# Patient Record
Sex: MALE | Race: WHITE | NOT HISPANIC OR LATINO | Employment: OTHER | URBAN - METROPOLITAN AREA
[De-identification: names, ages, dates, MRNs, and addresses within clinical notes are randomized per-mention and may not be internally consistent; named-entity substitution may affect disease eponyms.]

---

## 2019-06-21 ENCOUNTER — HOSPITAL ENCOUNTER (OUTPATIENT)
Facility: HOSPITAL | Age: 64
Setting detail: OBSERVATION
Discharge: HOME | End: 2019-06-23
Attending: EMERGENCY MEDICINE | Admitting: INTERNAL MEDICINE

## 2019-06-21 ENCOUNTER — APPOINTMENT (OUTPATIENT)
Dept: GENERAL RADIOLOGY | Facility: HOSPITAL | Age: 64
End: 2019-06-21

## 2019-06-21 DIAGNOSIS — I16.1 HYPERTENSIVE EMERGENCY: Primary | ICD-10-CM

## 2019-06-21 DIAGNOSIS — R94.31 ABNORMAL EKG: ICD-10-CM

## 2019-06-21 LAB
ALBUMIN SERPL-MCNC: 3.9 G/DL (ref 3.5–5.2)
ALBUMIN/GLOB SERPL: 1.6 G/DL
ALP SERPL-CCNC: 54 U/L (ref 39–117)
ALT SERPL W P-5'-P-CCNC: 13 U/L (ref 1–41)
ANION GAP SERPL CALCULATED.3IONS-SCNC: 11.1 MMOL/L
AST SERPL-CCNC: 14 U/L (ref 1–40)
BASOPHILS # BLD AUTO: 0.05 10*3/MM3 (ref 0–0.2)
BASOPHILS NFR BLD AUTO: 0.4 % (ref 0–1.5)
BILIRUB SERPL-MCNC: 0.6 MG/DL (ref 0.2–1.2)
BUN BLD-MCNC: 31 MG/DL (ref 8–23)
BUN/CREAT SERPL: 21.7 (ref 7–25)
CALCIUM SPEC-SCNC: 9.1 MG/DL (ref 8.6–10.5)
CHLORIDE SERPL-SCNC: 102 MMOL/L (ref 98–107)
CO2 SERPL-SCNC: 27.9 MMOL/L (ref 22–29)
CREAT BLD-MCNC: 1.43 MG/DL (ref 0.76–1.27)
DEPRECATED RDW RBC AUTO: 42.4 FL (ref 37–54)
EOSINOPHIL # BLD AUTO: 0.08 10*3/MM3 (ref 0–0.4)
EOSINOPHIL NFR BLD AUTO: 0.7 % (ref 0.3–6.2)
ERYTHROCYTE [DISTWIDTH] IN BLOOD BY AUTOMATED COUNT: 12.2 % (ref 12.3–15.4)
GFR SERPL CREATININE-BSD FRML MDRD: 50 ML/MIN/1.73
GLOBULIN UR ELPH-MCNC: 2.5 GM/DL
GLUCOSE BLD-MCNC: 159 MG/DL (ref 65–99)
HCT VFR BLD AUTO: 44.6 % (ref 37.5–51)
HGB BLD-MCNC: 14.7 G/DL (ref 13–17.7)
IMM GRANULOCYTES # BLD AUTO: 0.05 10*3/MM3 (ref 0–0.05)
IMM GRANULOCYTES NFR BLD AUTO: 0.4 % (ref 0–0.5)
LYMPHOCYTES # BLD AUTO: 3.07 10*3/MM3 (ref 0.7–3.1)
LYMPHOCYTES NFR BLD AUTO: 27.3 % (ref 19.6–45.3)
MCH RBC QN AUTO: 30.9 PG (ref 26.6–33)
MCHC RBC AUTO-ENTMCNC: 33 G/DL (ref 31.5–35.7)
MCV RBC AUTO: 93.9 FL (ref 79–97)
MONOCYTES # BLD AUTO: 0.84 10*3/MM3 (ref 0.1–0.9)
MONOCYTES NFR BLD AUTO: 7.5 % (ref 5–12)
NEUTROPHILS # BLD AUTO: 7.14 10*3/MM3 (ref 1.7–7)
NEUTROPHILS NFR BLD AUTO: 63.7 % (ref 42.7–76)
NRBC BLD AUTO-RTO: 0 /100 WBC (ref 0–0.2)
PLATELET # BLD AUTO: 301 10*3/MM3 (ref 140–450)
PMV BLD AUTO: 10.2 FL (ref 6–12)
POTASSIUM BLD-SCNC: 4.2 MMOL/L (ref 3.5–5.2)
PROT SERPL-MCNC: 6.4 G/DL (ref 6–8.5)
RBC # BLD AUTO: 4.75 10*6/MM3 (ref 4.14–5.8)
SODIUM BLD-SCNC: 141 MMOL/L (ref 136–145)
TROPONIN T SERPL-MCNC: <0.01 NG/ML (ref 0–0.03)
WBC NRBC COR # BLD: 11.23 10*3/MM3 (ref 3.4–10.8)

## 2019-06-21 PROCEDURE — 80053 COMPREHEN METABOLIC PANEL: CPT | Performed by: EMERGENCY MEDICINE

## 2019-06-21 PROCEDURE — 25010000002 HYDRALAZINE PER 20 MG: Performed by: NURSE PRACTITIONER

## 2019-06-21 PROCEDURE — 93005 ELECTROCARDIOGRAM TRACING: CPT | Performed by: EMERGENCY MEDICINE

## 2019-06-21 PROCEDURE — 96376 TX/PRO/DX INJ SAME DRUG ADON: CPT

## 2019-06-21 PROCEDURE — 84484 ASSAY OF TROPONIN QUANT: CPT | Performed by: EMERGENCY MEDICINE

## 2019-06-21 PROCEDURE — 71046 X-RAY EXAM CHEST 2 VIEWS: CPT

## 2019-06-21 PROCEDURE — 85025 COMPLETE CBC W/AUTO DIFF WBC: CPT | Performed by: EMERGENCY MEDICINE

## 2019-06-21 PROCEDURE — 25010000002 HYDRALAZINE PER 20 MG: Performed by: EMERGENCY MEDICINE

## 2019-06-21 PROCEDURE — 96374 THER/PROPH/DIAG INJ IV PUSH: CPT

## 2019-06-21 PROCEDURE — G0378 HOSPITAL OBSERVATION PER HR: HCPCS

## 2019-06-21 PROCEDURE — 99284 EMERGENCY DEPT VISIT MOD MDM: CPT

## 2019-06-21 PROCEDURE — 93010 ELECTROCARDIOGRAM REPORT: CPT | Performed by: INTERNAL MEDICINE

## 2019-06-21 RX ORDER — SODIUM CHLORIDE 0.9 % (FLUSH) 0.9 %
3-10 SYRINGE (ML) INJECTION AS NEEDED
Status: DISCONTINUED | OUTPATIENT
Start: 2019-06-21 | End: 2019-06-23 | Stop reason: HOSPADM

## 2019-06-21 RX ORDER — SODIUM CHLORIDE 0.9 % (FLUSH) 0.9 %
10 SYRINGE (ML) INJECTION AS NEEDED
Status: DISCONTINUED | OUTPATIENT
Start: 2019-06-21 | End: 2019-06-23 | Stop reason: HOSPADM

## 2019-06-21 RX ORDER — AMLODIPINE BESYLATE 5 MG/1
5 TABLET ORAL
Status: DISCONTINUED | OUTPATIENT
Start: 2019-06-22 | End: 2019-06-23 | Stop reason: HOSPADM

## 2019-06-21 RX ORDER — HYDRALAZINE HYDROCHLORIDE 20 MG/ML
20 INJECTION INTRAMUSCULAR; INTRAVENOUS ONCE
Status: COMPLETED | OUTPATIENT
Start: 2019-06-21 | End: 2019-06-21

## 2019-06-21 RX ORDER — ASPIRIN 81 MG/1
81 TABLET ORAL DAILY
COMMUNITY

## 2019-06-21 RX ORDER — ASPIRIN 81 MG/1
81 TABLET, CHEWABLE ORAL DAILY
COMMUNITY
End: 2019-06-21

## 2019-06-21 RX ORDER — SODIUM CHLORIDE 0.9 % (FLUSH) 0.9 %
3 SYRINGE (ML) INJECTION EVERY 12 HOURS SCHEDULED
Status: DISCONTINUED | OUTPATIENT
Start: 2019-06-22 | End: 2019-06-23 | Stop reason: HOSPADM

## 2019-06-21 RX ORDER — ACETAMINOPHEN 325 MG/1
650 TABLET ORAL EVERY 6 HOURS PRN
Status: DISCONTINUED | OUTPATIENT
Start: 2019-06-21 | End: 2019-06-23 | Stop reason: HOSPADM

## 2019-06-21 RX ORDER — HYDRALAZINE HYDROCHLORIDE 20 MG/ML
10 INJECTION INTRAMUSCULAR; INTRAVENOUS EVERY 6 HOURS PRN
Status: DISCONTINUED | OUTPATIENT
Start: 2019-06-21 | End: 2019-06-23 | Stop reason: HOSPADM

## 2019-06-21 RX ORDER — NITROGLYCERIN 0.4 MG/1
0.4 TABLET SUBLINGUAL
Status: DISCONTINUED | OUTPATIENT
Start: 2019-06-21 | End: 2019-06-23 | Stop reason: HOSPADM

## 2019-06-21 RX ORDER — NITROGLYCERIN 0.4 MG/1
0.4 TABLET SUBLINGUAL
Status: COMPLETED | OUTPATIENT
Start: 2019-06-21 | End: 2019-06-21

## 2019-06-21 RX ORDER — ASPIRIN 81 MG/1
81 TABLET ORAL DAILY
Status: DISCONTINUED | OUTPATIENT
Start: 2019-06-22 | End: 2019-06-23 | Stop reason: HOSPADM

## 2019-06-21 RX ADMIN — NITROGLYCERIN 0.4 MG: 0.4 TABLET, ORALLY DISINTEGRATING SUBLINGUAL at 19:05

## 2019-06-21 RX ADMIN — ACETAMINOPHEN 650 MG: 325 TABLET, FILM COATED ORAL at 23:28

## 2019-06-21 RX ADMIN — HYDRALAZINE HYDROCHLORIDE 10 MG: 20 INJECTION INTRAMUSCULAR; INTRAVENOUS at 23:28

## 2019-06-21 RX ADMIN — HYDRALAZINE HYDROCHLORIDE 20 MG: 20 INJECTION INTRAMUSCULAR; INTRAVENOUS at 19:14

## 2019-06-21 RX ADMIN — NITROGLYCERIN 0.4 MG: 0.4 TABLET, ORALLY DISINTEGRATING SUBLINGUAL at 18:56

## 2019-06-21 RX ADMIN — NITROGLYCERIN 0.4 MG: 0.4 TABLET, ORALLY DISINTEGRATING SUBLINGUAL at 18:50

## 2019-06-21 NOTE — ED NOTES
Pt reports he has been redoing his bathroom and has been working in black mold for about a month. Pt reports a headache 10/10, Hypertension and swollen glands. Upon completion of an EKG pt has elevated T waves. Pt denies CP at this time. A team C was called.      Adeola Petit, XIAO  06/21/19 1470

## 2019-06-21 NOTE — PROGRESS NOTES
Clinical Pharmacy Services: Medication History    Trever Petit is a 63 y.o. male presenting to [facility] for   Chief Complaint   Patient presents with   • Hypertension       He  has no past medical history on file.    Allergies as of 06/21/2019 - Reviewed 06/21/2019   Allergen Reaction Noted   • Clonidine derivatives Unknown (See Comments) 06/21/2019       Medication information was obtained from: Patient  Pharmacy and Phone Number: [email or web address] [phone]    Prior to Admission Medications     Prescriptions Last Dose Informant Patient Reported? Taking?    aspirin 81 MG EC tablet  Self Yes Yes    Take 81 mg by mouth Daily.            Medication notes: No other meds per patient.    This medication list is complete to the best of my knowledge as of 6/21/2019    Please call if questions.    Patti Felix, Medication History Technician  6/21/2019 7:51 PM

## 2019-06-21 NOTE — ED TRIAGE NOTES
Exposure to mold.  Went to [facility].  Incidental finding - Hypertension.  222/105.  Non compliant with meds.  HA.  Chronic neck and back pain

## 2019-06-21 NOTE — ED PROVIDER NOTES
" EMERGENCY DEPARTMENT ENCOUNTER    Room Number:  41/41  Date seen:  6/21/2019  Time seen: 6:43 PM  PCP: Provider, No Known  Historian: Patient      HPI:  Chief Complaint: High BP  A complete HPI/ROS/PMH/PSH/SH/FH are unobtainable due to: nothing  Context: Trever Petit is a 63 y.o. male who presents to the ED c/o high BP that started today. Pt admits to \"swollen neck glands\" and constipation. Pt notes that he had an MI years ago but denies cardiac stent. Pt denies weight loss, fever, cough, CP, SOA, diaphoresis, nausea, and vomiting.Pt states that he was exposed to black mold about a month ago.     Quality: High BP  Intensity/Severity: mild  Duration: today  Onset quality: gradual  Timing: constant  Progression: unchanged  Aggravating Factors: none  Alleviating Factors: none  Previous Episodes: Pt reports hx of MI.  Treatment before arrival: none  Associated Symptoms: \"swollen neck glands\" and constipation          PAST MEDICAL HISTORY  Active Ambulatory Problems     Diagnosis Date Noted   • No Active Ambulatory Problems     Resolved Ambulatory Problems     Diagnosis Date Noted   • No Resolved Ambulatory Problems     No Additional Past Medical History         PAST SURGICAL HISTORY  Past Surgical History:   Procedure Laterality Date   • NECK SURGERY           FAMILY HISTORY  History reviewed. No pertinent family history.      SOCIAL HISTORY  Social History     Socioeconomic History   • Marital status: Single     Spouse name: Not on file   • Number of children: Not on file   • Years of education: Not on file   • Highest education level: Not on file   Tobacco Use   • Smoking status: Never Smoker   Substance and Sexual Activity   • Alcohol use: No     Frequency: Never   • Drug use: Yes     Types: Marijuana   • Sexual activity: Defer         ALLERGIES  Clonidine derivatives        REVIEW OF SYSTEMS  Review of Systems   Constitutional: Negative for diaphoresis and fever.   HENT: Negative for congestion.    Eyes: " "Negative for visual disturbance.   Respiratory: Negative for cough and shortness of breath.    Cardiovascular: Negative for chest pain and palpitations.   Gastrointestinal: Positive for constipation. Negative for blood in stool and vomiting.   Endocrine: Negative for polyuria.   Genitourinary: Negative for flank pain.   Musculoskeletal: Negative for joint swelling.        Positive: \"swollen neck glands\"    Skin: Negative for wound.   Neurological: Negative for seizures.   Hematological: Negative for adenopathy.   Psychiatric/Behavioral: Negative for sleep disturbance.            PHYSICAL EXAM  ED Triage Vitals [06/21/19 1734]   Temp Heart Rate Resp BP SpO2   97.5 °F (36.4 °C) 51 18 (!) 220/110 99 %      Temp src Heart Rate Source Patient Position BP Location FiO2 (%)   Tympanic Monitor -- -- --         GENERAL: no acute distress  HENT: nares patent, oropharynx is clear  EYES: no scleral icterus  CV: regular rhythm, normal rate  RESPIRATORY: normal effort, CTAB  ABDOMEN: soft, notender  MUSCULOSKELETAL: no deformity  NEURO: alert, moves all extremities, follows commands  SKIN: warm, dry  LYMPH: Mild anterior cervical adenopathy  Vital signs and nursing notes reviewed.          LAB RESULTS  Recent Results (from the past 24 hour(s))   Comprehensive Metabolic Panel    Collection Time: 06/21/19  6:46 PM   Result Value Ref Range    Glucose 159 (H) 65 - 99 mg/dL    BUN 31 (H) 8 - 23 mg/dL    Creatinine 1.43 (H) 0.76 - 1.27 mg/dL    Sodium 141 136 - 145 mmol/L    Potassium 4.2 3.5 - 5.2 mmol/L    Chloride 102 98 - 107 mmol/L    CO2 27.9 22.0 - 29.0 mmol/L    Calcium 9.1 8.6 - 10.5 mg/dL    Total Protein 6.4 6.0 - 8.5 g/dL    Albumin 3.90 3.50 - 5.20 g/dL    ALT (SGPT) 13 1 - 41 U/L    AST (SGOT) 14 1 - 40 U/L    Alkaline Phosphatase 54 39 - 117 U/L    Total Bilirubin 0.6 0.2 - 1.2 mg/dL    eGFR Non African Amer 50 (L) >60 mL/min/1.73    Globulin 2.5 gm/dL    A/G Ratio 1.6 g/dL    BUN/Creatinine Ratio 21.7 7.0 - 25.0    " Anion Gap 11.1 mmol/L   Troponin    Collection Time: 06/21/19  6:46 PM   Result Value Ref Range    Troponin T <0.010 0.000 - 0.030 ng/mL   CBC Auto Differential    Collection Time: 06/21/19  6:46 PM   Result Value Ref Range    WBC 11.23 (H) 3.40 - 10.80 10*3/mm3    RBC 4.75 4.14 - 5.80 10*6/mm3    Hemoglobin 14.7 13.0 - 17.7 g/dL    Hematocrit 44.6 37.5 - 51.0 %    MCV 93.9 79.0 - 97.0 fL    MCH 30.9 26.6 - 33.0 pg    MCHC 33.0 31.5 - 35.7 g/dL    RDW 12.2 (L) 12.3 - 15.4 %    RDW-SD 42.4 37.0 - 54.0 fl    MPV 10.2 6.0 - 12.0 fL    Platelets 301 140 - 450 10*3/mm3    Neutrophil % 63.7 42.7 - 76.0 %    Lymphocyte % 27.3 19.6 - 45.3 %    Monocyte % 7.5 5.0 - 12.0 %    Eosinophil % 0.7 0.3 - 6.2 %    Basophil % 0.4 0.0 - 1.5 %    Immature Grans % 0.4 0.0 - 0.5 %    Neutrophils, Absolute 7.14 (H) 1.70 - 7.00 10*3/mm3    Lymphocytes, Absolute 3.07 0.70 - 3.10 10*3/mm3    Monocytes, Absolute 0.84 0.10 - 0.90 10*3/mm3    Eosinophils, Absolute 0.08 0.00 - 0.40 10*3/mm3    Basophils, Absolute 0.05 0.00 - 0.20 10*3/mm3    Immature Grans, Absolute 0.05 0.00 - 0.05 10*3/mm3    nRBC 0.0 0.0 - 0.2 /100 WBC       Ordered the above labs and reviewed the results.        RADIOLOGY  Xr Chest 2 View    Result Date: 6/21/2019  XR CHEST 2 VW-  HISTORY: Male who is 63 years-old,  cough  TECHNIQUE: Frontal and lateral views of the chest  COMPARISON: 2/13/2014  FINDINGS: Heart size is borderline. Pulmonary vasculature is unremarkable. No focal pulmonary consolidation, pleural effusion, or pneumothorax. Old granulomatous disease. No acute osseous process.      No focal pulmonary consolidation. Follow-up as clinical indications persist.  This report was finalized on 6/21/2019 7:55 PM by Dr. Ajith Holder M.D.        Ordered the above noted radiological studies. Reviewed by me in PACS.          PROCEDURES  Procedures  EKG:           EKG time: 1839  Rhythm/Rate: SB rate 49  P waves and IA: nml  QRS, axis: RBBB and LAFB   ST and T waves:  proximally 2mm of ST elevation in V2 as well as 1mm ST elevation in V3 and V4  Concerning for acute anterior MI    Interpreted Contemporaneously by me, independently viewed and changed compared to prior 5/19/11. The RBBB and anterior ST elevation are new.    REPEAT EKG          EKG time: 1934  Rhythm/Rate: SR rate 57  P waves and DE: nml  QRS, axis: RBBB and LAFB   ST and T waves: perhaps .5mm of ST elevation in V2 and V3 which has improved from prior tracing to day    The pt's BP is know 164/89     Interpreted Contemporaneously by me, independently viewed.        MEDICATIONS GIVEN IN ER  Medications   sodium chloride 0.9 % flush 10 mL (not administered)   nitroglycerin (NITROSTAT) SL tablet 0.4 mg (0.4 mg Sublingual Given 6/21/19 1905)   hydrALAZINE (APRESOLINE) injection 20 mg (20 mg Intravenous Given 6/21/19 1914)       PROGRESS AND CONSULTS     1848- Discussed pt's case with Dr. Paz ([facility]) who will review the EKG.    1851- Dr. Paz requests aggressive BP control and a repeat EKG and that troponin labs be ordered due to the pt being asymptomatic.    1944- Repeat EKG was sent to Dr. Paz via text message. Dr. Paz will admit the pt to a tele bed for further care.    1959- Rechecked pt who is resting comfortably in NAD. Informed pt of the lab and imaging results. D/w pt the plan to admit to the hospital for further care. Pt understands and agrees with plan. All questions answered.            MEDICAL DECISION MAKING    63-year-old male was sent here from immediate care clinic for asymptomatic hypertension.  He was noted to be hypertensive on arrival but no complaint of chest pain, shortness of breath, nausea, diaphoresis.  Screening EKG revealed right bundle branch block which is new from previous tracing and also ST elevation in leads V2 and V3 concerning for acute myocardial infarction.  I discussed these findings with Dr. Paz who reviewed the patient's EKG from today as well as his previous EKG.  Given the patient has no  symptoms he felt this was most likely related to his elevated blood pressure I recommended reducing the patient's blood pressure and obtaining repeat EKG.  After the patient received sublingual nitroglycerin as well as IV hydralazine to lower his blood pressure, repeat EKG showed resolution of these ST elevation abnormalities.  Additionally, his initial cardiac troponin is negative.  Labs are otherwise reassuring.  His chest x-ray is clear.  I have no findings suggestive of toxic mold exposure.  He will be admitted to cardiology for further evaluation of his dynamic EKG changes and hypertensive emergency.    MDM  Number of Diagnoses or Management Options  Abnormal EKG:   Hypertensive emergency:      Amount and/or Complexity of Data Reviewed  Clinical lab tests: ordered and reviewed (WBC: 11.23  Troponin: negative)  Tests in the radiology section of CPT®: ordered and reviewed (CXR: Negative acute)  Tests in the medicine section of CPT®: ordered and reviewed (See EKG and REPEAT EKG note)               DIAGNOSIS  Final diagnoses:   Hypertensive emergency   Abnormal EKG         DISPOSITION  ADMISSION    Discussed treatment plan and reason for admission with pt/family and admitting physician.  Pt/family voiced understanding of the plan for admission for further testing/treatment as needed.                 Latest Documented Vital Signs:  As of 8:13 PM  BP- 179/86 HR- 51 Temp- 97.5 °F (36.4 °C) (Tympanic) O2 sat- 99%        --  Documentation assistance provided by pastor Aguirre for Dr. CHRIS Blankenship MD.  Information recorded by the scribe was done at my direction and has been verified and validated by me.      Please note that portions of this were completed with a voice recognition program.          Thea Aguirre  06/21/19 2014       Bebeto Blankenship MD  06/21/19 4977

## 2019-06-22 ENCOUNTER — APPOINTMENT (OUTPATIENT)
Dept: CARDIOLOGY | Facility: HOSPITAL | Age: 64
End: 2019-06-22

## 2019-06-22 LAB
ANION GAP SERPL CALCULATED.3IONS-SCNC: 11.1 MMOL/L
AORTIC DIMENSIONLESS INDEX: 0.7 (DI)
BH CV ECHO MEAS - ACS: 2.2 CM
BH CV ECHO MEAS - AO MAX PG (FULL): 13.8 MMHG
BH CV ECHO MEAS - AO MAX PG: 22.5 MMHG
BH CV ECHO MEAS - AO MEAN PG (FULL): 6 MMHG
BH CV ECHO MEAS - AO MEAN PG: 11 MMHG
BH CV ECHO MEAS - AO V2 MAX: 237 CM/SEC
BH CV ECHO MEAS - AO V2 MEAN: 147 CM/SEC
BH CV ECHO MEAS - AO V2 VTI: 45.8 CM
BH CV ECHO MEAS - AVA(I,A): 2.3 CM^2
BH CV ECHO MEAS - AVA(I,D): 2.3 CM^2
BH CV ECHO MEAS - AVA(V,A): 2.4 CM^2
BH CV ECHO MEAS - AVA(V,D): 2.4 CM^2
BH CV ECHO MEAS - BSA(HAYCOCK): 1.8 M^2
BH CV ECHO MEAS - BSA: 1.8 M^2
BH CV ECHO MEAS - BZI_BMI: 23.6 KILOGRAMS/M^2
BH CV ECHO MEAS - BZI_METRIC_HEIGHT: 172.7 CM
BH CV ECHO MEAS - BZI_METRIC_WEIGHT: 70.3 KG
BH CV ECHO MEAS - EDV(CUBED): 74.1 ML
BH CV ECHO MEAS - EDV(MOD-SP2): 67 ML
BH CV ECHO MEAS - EDV(MOD-SP4): 66 ML
BH CV ECHO MEAS - EDV(TEICH): 78.6 ML
BH CV ECHO MEAS - EF(CUBED): 73.4 %
BH CV ECHO MEAS - EF(MOD-BP): 68 %
BH CV ECHO MEAS - EF(MOD-SP2): 65.7 %
BH CV ECHO MEAS - EF(MOD-SP4): 69.7 %
BH CV ECHO MEAS - EF(TEICH): 65.6 %
BH CV ECHO MEAS - ESV(CUBED): 19.7 ML
BH CV ECHO MEAS - ESV(MOD-SP2): 23 ML
BH CV ECHO MEAS - ESV(MOD-SP4): 20 ML
BH CV ECHO MEAS - ESV(TEICH): 27 ML
BH CV ECHO MEAS - FS: 35.7 %
BH CV ECHO MEAS - IVS/LVPW: 1.3
BH CV ECHO MEAS - IVSD: 1.9 CM
BH CV ECHO MEAS - LAT PEAK E' VEL: 8.9 CM/SEC
BH CV ECHO MEAS - LV DIASTOLIC VOL/BSA (35-75): 36 ML/M^2
BH CV ECHO MEAS - LV MASS(C)D: 304.2 GRAMS
BH CV ECHO MEAS - LV MASS(C)DI: 165.9 GRAMS/M^2
BH CV ECHO MEAS - LV MAX PG: 8.6 MMHG
BH CV ECHO MEAS - LV MEAN PG: 5 MMHG
BH CV ECHO MEAS - LV SYSTOLIC VOL/BSA (12-30): 10.9 ML/M^2
BH CV ECHO MEAS - LV V1 MAX: 147 CM/SEC
BH CV ECHO MEAS - LV V1 MEAN: 105 CM/SEC
BH CV ECHO MEAS - LV V1 VTI: 27.5 CM
BH CV ECHO MEAS - LVIDD: 4.2 CM
BH CV ECHO MEAS - LVIDS: 2.7 CM
BH CV ECHO MEAS - LVLD AP2: 7.4 CM
BH CV ECHO MEAS - LVLD AP4: 7.8 CM
BH CV ECHO MEAS - LVLS AP2: 6 CM
BH CV ECHO MEAS - LVLS AP4: 5.9 CM
BH CV ECHO MEAS - LVOT AREA (M): 3.8 CM^2
BH CV ECHO MEAS - LVOT AREA: 3.8 CM^2
BH CV ECHO MEAS - LVOT DIAM: 2.2 CM
BH CV ECHO MEAS - LVPWD: 1.5 CM
BH CV ECHO MEAS - MED PEAK E' VEL: 5.1 CM/SEC
BH CV ECHO MEAS - MV A DUR: 0.14 SEC
BH CV ECHO MEAS - MV A MAX VEL: 108 CM/SEC
BH CV ECHO MEAS - MV DEC SLOPE: 320 CM/SEC^2
BH CV ECHO MEAS - MV DEC TIME: 327 SEC
BH CV ECHO MEAS - MV E MAX VEL: 66.7 CM/SEC
BH CV ECHO MEAS - MV E/A: 0.62
BH CV ECHO MEAS - MV MAX PG: 9.6 MMHG
BH CV ECHO MEAS - MV MEAN PG: 3 MMHG
BH CV ECHO MEAS - MV P1/2T MAX VEL: 132 CM/SEC
BH CV ECHO MEAS - MV P1/2T: 120.8 MSEC
BH CV ECHO MEAS - MV V2 MAX: 155 CM/SEC
BH CV ECHO MEAS - MV V2 MEAN: 79.3 CM/SEC
BH CV ECHO MEAS - MV V2 VTI: 43.3 CM
BH CV ECHO MEAS - MVA P1/2T LCG: 1.7 CM^2
BH CV ECHO MEAS - MVA(P1/2T): 1.8 CM^2
BH CV ECHO MEAS - MVA(VTI): 2.4 CM^2
BH CV ECHO MEAS - PA ACC TIME: 0.08 SEC
BH CV ECHO MEAS - PA MAX PG (FULL): 2.5 MMHG
BH CV ECHO MEAS - PA MAX PG: 8 MMHG
BH CV ECHO MEAS - PA PR(ACCEL): 43.5 MMHG
BH CV ECHO MEAS - PA V2 MAX: 141 CM/SEC
BH CV ECHO MEAS - PULM A REVS DUR: 0.12 SEC
BH CV ECHO MEAS - PULM A REVS VEL: 32.3 CM/SEC
BH CV ECHO MEAS - PULM DIAS VEL: 39.3 CM/SEC
BH CV ECHO MEAS - PULM S/D: 1.8
BH CV ECHO MEAS - PULM SYS VEL: 70.6 CM/SEC
BH CV ECHO MEAS - RAP SYSTOLE: 3 MMHG
BH CV ECHO MEAS - RV MAX PG: 5.5 MMHG
BH CV ECHO MEAS - RV MEAN PG: 3 MMHG
BH CV ECHO MEAS - RV V1 MAX: 117 CM/SEC
BH CV ECHO MEAS - RV V1 MEAN: 79.2 CM/SEC
BH CV ECHO MEAS - RV V1 VTI: 23 CM
BH CV ECHO MEAS - RVSP: 13 MMHG
BH CV ECHO MEAS - SI(CUBED): 29.7 ML/M^2
BH CV ECHO MEAS - SI(LVOT): 57 ML/M^2
BH CV ECHO MEAS - SI(MOD-SP2): 24 ML/M^2
BH CV ECHO MEAS - SI(MOD-SP4): 25.1 ML/M^2
BH CV ECHO MEAS - SI(TEICH): 28.1 ML/M^2
BH CV ECHO MEAS - SV(CUBED): 54.4 ML
BH CV ECHO MEAS - SV(LVOT): 104.5 ML
BH CV ECHO MEAS - SV(MOD-SP2): 44 ML
BH CV ECHO MEAS - SV(MOD-SP4): 46 ML
BH CV ECHO MEAS - SV(TEICH): 51.6 ML
BH CV ECHO MEAS - TAPSE (>1.6): 2.7 CM2
BH CV ECHO MEAS - TR MAX PG: 10
BH CV ECHO MEAS - TR MAX VEL: 161 CM/SEC
BH CV ECHO MEASUREMENTS AVERAGE E/E' RATIO: 9.53
BH CV VAS BP RIGHT ARM: NORMAL MMHG
BH CV XLRA - RV BASE: 3.3 CM
BH CV XLRA - TDI S': 13.6 CM/SEC
BUN BLD-MCNC: 27 MG/DL (ref 8–23)
BUN/CREAT SERPL: 23.3 (ref 7–25)
CALCIUM SPEC-SCNC: 8.7 MG/DL (ref 8.6–10.5)
CHLORIDE SERPL-SCNC: 105 MMOL/L (ref 98–107)
CHOLEST SERPL-MCNC: 154 MG/DL (ref 0–200)
CO2 SERPL-SCNC: 22.9 MMOL/L (ref 22–29)
CREAT BLD-MCNC: 1.16 MG/DL (ref 0.76–1.27)
GFR SERPL CREATININE-BSD FRML MDRD: 64 ML/MIN/1.73
GLUCOSE BLD-MCNC: 168 MG/DL (ref 65–99)
GLUCOSE BLDC GLUCOMTR-MCNC: 119 MG/DL (ref 70–130)
GLUCOSE BLDC GLUCOMTR-MCNC: 184 MG/DL (ref 70–130)
GLUCOSE BLDC GLUCOMTR-MCNC: 185 MG/DL (ref 70–130)
HDLC SERPL-MCNC: 31 MG/DL (ref 40–60)
LDLC SERPL CALC-MCNC: 59 MG/DL (ref 0–100)
LDLC/HDLC SERPL: 1.89 {RATIO}
LEFT ATRIUM VOLUME INDEX: 32 ML/M2
LV EF 2D ECHO EST: 68 %
MAXIMAL PREDICTED HEART RATE: 157 BPM
POTASSIUM BLD-SCNC: 3.8 MMOL/L (ref 3.5–5.2)
SODIUM BLD-SCNC: 139 MMOL/L (ref 136–145)
STRESS TARGET HR: 133 BPM
TRIGL SERPL-MCNC: 322 MG/DL (ref 0–150)
TROPONIN T SERPL-MCNC: <0.01 NG/ML (ref 0–0.03)
VLDLC SERPL-MCNC: 64.4 MG/DL (ref 5–40)

## 2019-06-22 PROCEDURE — 80048 BASIC METABOLIC PNL TOTAL CA: CPT | Performed by: NURSE PRACTITIONER

## 2019-06-22 PROCEDURE — 84484 ASSAY OF TROPONIN QUANT: CPT | Performed by: NURSE PRACTITIONER

## 2019-06-22 PROCEDURE — 80061 LIPID PANEL: CPT | Performed by: NURSE PRACTITIONER

## 2019-06-22 PROCEDURE — 93306 TTE W/DOPPLER COMPLETE: CPT | Performed by: INTERNAL MEDICINE

## 2019-06-22 PROCEDURE — 82962 GLUCOSE BLOOD TEST: CPT

## 2019-06-22 PROCEDURE — 25010000002 HYDRALAZINE PER 20 MG: Performed by: NURSE PRACTITIONER

## 2019-06-22 PROCEDURE — 96376 TX/PRO/DX INJ SAME DRUG ADON: CPT

## 2019-06-22 PROCEDURE — G0378 HOSPITAL OBSERVATION PER HR: HCPCS

## 2019-06-22 PROCEDURE — 93306 TTE W/DOPPLER COMPLETE: CPT

## 2019-06-22 PROCEDURE — 99220 PR INITIAL OBSERVATION CARE/DAY 70 MINUTES: CPT | Performed by: INTERNAL MEDICINE

## 2019-06-22 RX ORDER — HYDRALAZINE HYDROCHLORIDE 50 MG/1
50 TABLET, FILM COATED ORAL EVERY 12 HOURS SCHEDULED
Status: DISCONTINUED | OUTPATIENT
Start: 2019-06-22 | End: 2019-06-23 | Stop reason: HOSPADM

## 2019-06-22 RX ADMIN — SODIUM CHLORIDE, PRESERVATIVE FREE 3 ML: 5 INJECTION INTRAVENOUS at 21:27

## 2019-06-22 RX ADMIN — HYDRALAZINE HYDROCHLORIDE 10 MG: 20 INJECTION INTRAMUSCULAR; INTRAVENOUS at 16:55

## 2019-06-22 RX ADMIN — HYDRALAZINE HYDROCHLORIDE 50 MG: 50 TABLET, FILM COATED ORAL at 21:27

## 2019-06-22 RX ADMIN — ACETAMINOPHEN 650 MG: 325 TABLET, FILM COATED ORAL at 11:42

## 2019-06-22 RX ADMIN — HYDRALAZINE HYDROCHLORIDE 50 MG: 50 TABLET, FILM COATED ORAL at 12:54

## 2019-06-22 RX ADMIN — AMLODIPINE BESYLATE 5 MG: 5 TABLET ORAL at 08:43

## 2019-06-22 RX ADMIN — SODIUM CHLORIDE, PRESERVATIVE FREE 3 ML: 5 INJECTION INTRAVENOUS at 00:02

## 2019-06-22 RX ADMIN — HYDRALAZINE HYDROCHLORIDE 10 MG: 20 INJECTION INTRAMUSCULAR; INTRAVENOUS at 05:14

## 2019-06-22 RX ADMIN — ACETAMINOPHEN 650 MG: 325 TABLET, FILM COATED ORAL at 21:27

## 2019-06-22 RX ADMIN — ASPIRIN 81 MG: 81 TABLET, COATED ORAL at 08:43

## 2019-06-22 RX ADMIN — SODIUM CHLORIDE, PRESERVATIVE FREE 3 ML: 5 INJECTION INTRAVENOUS at 08:53

## 2019-06-22 NOTE — H&P
"    Patient Name: Trever Petit  :1955  63 y.o.    Date of Admission: 2019  Date of Consultation:  19  Encounter Provider: Mumtaz Paz MD  Place of Service: [facility] CARDIOLOGY  Referring Provider: Mumtaz Paz MD  Patient Care Team:  Provider, No Known as PCP - General      Chief complaint: Hypertensive urgency    History of Present Illness: Mr. Petit is a 63 year old male with minimal documeneted medical history who presented to the ED 19 with reports of high blood pressure that had started earlier that day. He reports \"swollen neck glands\" and constipation and states he had an MI several years ago, but did not receive any stents. He also reports that he had been remodeling his bathroom and was exposed to black mold about a month ago. CXR upon arrival to the ED was negative for any acute processes. Troponin x2 have been negative. Creatinine 1.43 and WBC 11.23 on arrival. Blood pressure was noted to be 220/110 in the ED. He recieved 20mg IV Hydralazine. SBP has remained elevated between 150-210.     CXR 19  FINDINGS: Heart size is borderline. Pulmonary vasculature is  unremarkable. No focal pulmonary consolidation, pleural effusion, or  pneumothorax. Old granulomatous disease. No acute osseous process.  IMPRESSION:  No focal pulmonary consolidation. Follow-up as clinical  indications persist.      Past Medical History:   Diagnosis Date   • Arthritis    • Carpal bone fracture    • Carpal tunnel syndrome    • Constipation    • COPD (chronic obstructive pulmonary disease) (CMS/HCC)    • Coronary artery disease    • Diabetes mellitus (CMS/HCC)    • Elevated cholesterol    • Hypertension    • Myocardial infarction (CMS/HCC)    • Neuropathy    • Spinal stenosis    • Stroke (CMS/HCC)        Past Surgical History:   Procedure Laterality Date   • CARPAL TUNNEL RELEASE     • CERVICAL FUSION     • NECK SURGERY           Prior to Admission medications  "   Medication Sig Start Date End Date Taking? Authorizing Provider   aspirin 81 MG EC tablet Take 81 mg by mouth Daily.   Yes Provider, Historical, MD       Allergies   Allergen Reactions   • Clonidine Derivatives Unknown (See Comments)     .       Social History     Socioeconomic History   • Marital status: Single     Spouse name: Not on file   • Number of children: Not on file   • Years of education: Not on file   • Highest education level: Not on file   Tobacco Use   • Smoking status: Never Smoker   Substance and Sexual Activity   • Alcohol use: No     Frequency: Never   • Drug use: Yes     Types: Marijuana   • Sexual activity: Defer       History reviewed. No pertinent family history.    REVIEW OF SYSTEMS:   All systems reviewed.  Pertinent positives identified in HPI.  All other systems are negative.      Objective:     Vitals:    06/22/19 0510 06/22/19 0514 06/22/19 0600 06/22/19 0815   BP:  (!) 196/99 170/88 (!) 185/108   BP Location:  Left arm Left arm Left arm   Patient Position:  Lying Lying Lying   Pulse:  54     Resp: 16  16 16   Temp:    98.1 °F (36.7 °C)   TempSrc:    Oral   SpO2:       Weight:       Height:         Body mass index is 23.66 kg/m².    General Appearance:    Alert, cooperative, in no acute distress   Head:    Normocephalic, without obvious abnormality, atraumatic   Eyes:            Lids and lashes normal, conjunctivae and sclerae normal, no   icterus, no pallor, corneas clear, PERRLA   Ears:    Ears appear intact with no abnormalities noted   Throat:   No oral lesions, no thrush, oral mucosa moist   Neck:   No adenopathy, supple, trachea midline, no thyromegaly, no   carotid bruit, no JVD   Back:     No kyphosis present, no scoliosis present, no skin lesions, erythema or scars, no tenderness to percussion or palpation, range of motion normal   Lungs:     Clear to auscultation,respirations regular, even and unlabored    Heart:    Regular rhythm and normal rate, normal S1 and S2, no murmur,  no gallop, no rub, no click   Chest Wall:    No abnormalities observed   Abdomen:     Normal bowel sounds, no masses, no organomegaly, soft        non-tender, non-distended, no guarding, no rebound  tenderness   Extremities:   Moves all extremities well, no edema, no cyanosis, no redness   Pulses:   Pulses palpable and equal bilaterally. Normal radial, carotid, femoral, dorsalis pedis and posterior tibial pulses bilaterally. Normal abdominal aorta   Skin:  Psychiatric:   No bleeding, bruising or rash    Alert and oriented x 3, normal mood and affect   Lab Review:     Results from last 7 days   Lab Units 06/22/19  0437 06/21/19  1846   SODIUM mmol/L 139 141   POTASSIUM mmol/L 3.8 4.2   CHLORIDE mmol/L 105 102   CO2 mmol/L 22.9 27.9   BUN mg/dL 27* 31*   CREATININE mg/dL 1.16 1.43*   CALCIUM mg/dL 8.7 9.1   BILIRUBIN mg/dL  --  0.6   ALK PHOS U/L  --  54   ALT (SGPT) U/L  --  13   AST (SGOT) U/L  --  14   GLUCOSE mg/dL 168* 159*     Results from last 7 days   Lab Units 06/22/19  0028 06/21/19  1846   TROPONIN T ng/mL <0.010 <0.010     Results from last 7 days   Lab Units 06/21/19  1846   WBC 10*3/mm3 11.23*   HEMOGLOBIN g/dL 14.7   HEMATOCRIT % 44.6   PLATELETS 10*3/mm3 301             Results from last 7 days   Lab Units 06/22/19  0437   CHOLESTEROL mg/dL 154   TRIGLYCERIDES mg/dL 322*   HDL CHOL mg/dL 31*   LDL CHOL mg/dL 59               EKG 6/21/19 @ 1934    EKG 6/21/19 @ 1839            I personally viewed and interpreted the patient's EKG/Telemetry data.        Assessment and Plan:       63 year old man with long-standing hypertension.  He was previously on clonidine, but stopped it due to side effects.  He doesn't have a PCP or cardiologist.  He presents with asymptomatic severe HTN.  Now improved.  I would try hydralazine and amlodipine.    He'll need close follow up as outpatient by cardiology and he'll need some help finding a PCP.    Mumtaz Paz MD  06/22/19  11:02 AM

## 2019-06-22 NOTE — PLAN OF CARE
Problem: Patient Care Overview  Goal: Plan of Care Review  Outcome: Ongoing (interventions implemented as appropriate)   06/22/19 0075   Coping/Psychosocial   Plan of Care Reviewed With patient   Plan of Care Review   Progress improving   OTHER   Outcome Summary Pt still with HTN, Hydralazine given po and prn. Will continue to monitor closely        Problem: Hypertensive Disease/Crisis (Arterial) (Adult)  Goal: Signs and Symptoms of Listed Potential Problems Will be Absent, Minimized or Managed (Hypertensive Disease/Crisis)  Outcome: Ongoing (interventions implemented as appropriate)      Problem: Pain, Chronic (Adult)  Goal: Identify Related Risk Factors and Signs and Symptoms  Outcome: Ongoing (interventions implemented as appropriate)

## 2019-06-22 NOTE — PLAN OF CARE
Problem: Patient Care Overview  Goal: Interprofessional Rounds/Family Conf  Outcome: Ongoing (interventions implemented as appropriate)      Problem: Hypertensive Disease/Crisis (Arterial) (Adult)  Goal: Signs and Symptoms of Listed Potential Problems Will be Absent, Minimized or Managed (Hypertensive Disease/Crisis)  Outcome: Ongoing (interventions implemented as appropriate)

## 2019-06-23 VITALS
HEART RATE: 70 BPM | TEMPERATURE: 98.1 F | RESPIRATION RATE: 18 BRPM | OXYGEN SATURATION: 97 % | BODY MASS INDEX: 23.49 KG/M2 | DIASTOLIC BLOOD PRESSURE: 78 MMHG | WEIGHT: 155 LBS | HEIGHT: 68 IN | SYSTOLIC BLOOD PRESSURE: 140 MMHG

## 2019-06-23 PROCEDURE — 99217 PR OBSERVATION CARE DISCHARGE MANAGEMENT: CPT | Performed by: INTERNAL MEDICINE

## 2019-06-23 PROCEDURE — 96376 TX/PRO/DX INJ SAME DRUG ADON: CPT

## 2019-06-23 PROCEDURE — G0378 HOSPITAL OBSERVATION PER HR: HCPCS

## 2019-06-23 PROCEDURE — 25010000002 HYDRALAZINE PER 20 MG: Performed by: NURSE PRACTITIONER

## 2019-06-23 RX ORDER — HYDRALAZINE HYDROCHLORIDE 50 MG/1
50 TABLET, FILM COATED ORAL EVERY 12 HOURS SCHEDULED
Qty: 60 TABLET | Refills: 11 | Status: SHIPPED | OUTPATIENT
Start: 2019-06-23 | End: 2020-05-27 | Stop reason: HOSPADM

## 2019-06-23 RX ORDER — METOPROLOL TARTRATE 50 MG/1
50 TABLET, FILM COATED ORAL EVERY 12 HOURS SCHEDULED
Qty: 60 TABLET | Refills: 11 | Status: SHIPPED | OUTPATIENT
Start: 2019-06-23 | End: 2019-06-28 | Stop reason: HOSPADM

## 2019-06-23 RX ORDER — AMLODIPINE BESYLATE 5 MG/1
5 TABLET ORAL
Qty: 30 TABLET | Refills: 11 | Status: ON HOLD | OUTPATIENT
Start: 2019-06-24 | End: 2019-06-28 | Stop reason: SDUPTHER

## 2019-06-23 RX ORDER — METOPROLOL TARTRATE 50 MG/1
50 TABLET, FILM COATED ORAL EVERY 12 HOURS SCHEDULED
Status: DISCONTINUED | OUTPATIENT
Start: 2019-06-23 | End: 2019-06-23 | Stop reason: HOSPADM

## 2019-06-23 RX ADMIN — ACETAMINOPHEN 650 MG: 325 TABLET, FILM COATED ORAL at 06:57

## 2019-06-23 RX ADMIN — HYDRALAZINE HYDROCHLORIDE 10 MG: 20 INJECTION INTRAMUSCULAR; INTRAVENOUS at 04:21

## 2019-06-23 RX ADMIN — AMLODIPINE BESYLATE 5 MG: 5 TABLET ORAL at 08:57

## 2019-06-23 RX ADMIN — HYDRALAZINE HYDROCHLORIDE 50 MG: 50 TABLET, FILM COATED ORAL at 08:58

## 2019-06-23 RX ADMIN — ASPIRIN 81 MG: 81 TABLET, COATED ORAL at 08:57

## 2019-06-23 RX ADMIN — METOPROLOL TARTRATE 50 MG: 50 TABLET, FILM COATED ORAL at 10:43

## 2019-06-23 RX ADMIN — SODIUM CHLORIDE, PRESERVATIVE FREE 3 ML: 5 INJECTION INTRAVENOUS at 08:58

## 2019-06-23 NOTE — DISCHARGE SUMMARY
Date of Discharge:  6/23/2019  Date of Admit: 6/21/2019    Discharge Diagnosis:  Hypertensive emergency      Hospital Course: The patient was admitted due to hypertensive urgency.  He was treated with escalating doses of BP medication.  By 6/23/19 his blood pressure became adequately controlled.  He is now ready for discharge.    Procedures Performed         Consults     No orders found from 5/23/2019 to 6/22/2019.          Pertinent Test Results:     Interpretation Summary     · Left ventricular systolic function is normal. Calculated EF = 68.0%. Estimated EF was in agreement with the calculated EF. Estimated EF = 68%. Normal left ventricular cavity size noted. All left ventricular wall segments contract normally. Left ventricular wall thickness is consistent with moderate septal asymmetric hypertrophy. There is no LVOT obstruction.. Left ventricular diastolic dysfunction is noted (grade I) consistent with impaired relaxation.  · The aortic valve is abnormal in structure. There is mild thickening of the aortic valve. No aortic valve regurgitation is present.  · Mild MAC is present. Trace mitral valve regurgitation is present.  · Trace tricuspid valve regurgitation is present. Estimated right ventricular systolic pressure from tricuspid regurgitation is normal (<35 mmHg).  · Mild dilation of the ascending aorta is present. The ascending aorta measures 3.8cm.           Discharge Physical Exam:    General Appearance No acute distress   Neck No adenopathy, supple, trachea midline, no thyromegaly, no carotid bruit, no JVD   Lungs Clear to auscultation,respirations regular, even and unlabored   Heart Regular rhythm and normal rate, normal S1 and S2, no murmur, no gallop, no rub, no click   Chest wall No abnormalities observed   Abdomen Normal bowel sounds, no masses, no hepatomegaly, soft   Extremities Moves all extremities well, no edema, no cyanosis, no redness   Neurological Alert and oriented x 3     Discharge  Medications     Discharge Medications      New Medications      Instructions Start Date   amLODIPine 5 MG tablet  Commonly known as:  NORVASC   5 mg, Oral, Every 24 Hours Scheduled   Start Date:  6/24/2019     hydrALAZINE 50 MG tablet  Commonly known as:  APRESOLINE   50 mg, Oral, Every 12 Hours Scheduled      metoprolol tartrate 50 MG tablet  Commonly known as:  LOPRESSOR   50 mg, Oral, Every 12 Hours Scheduled         Continue These Medications      Instructions Start Date   aspirin 81 MG EC tablet   81 mg, Oral, Daily             Discharge Diet: healthy heart    Activity at Discharge: ad antoinette    Discharge disposition: home    Condition on Discharge: stable    Follow-up Appointments  No future appointments.  Additional Instructions for the Follow-ups that You Need to Schedule     Discharge Follow-up with Specified Provider: 1 week with interventional NP   As directed      To:  1 week with interventional NP               Test Results Pending at Discharge       Mumtaz Paz MD  06/23/19  10:16 AM

## 2019-06-23 NOTE — PLAN OF CARE
Problem: Patient Care Overview  Goal: Plan of Care Review  Outcome: Ongoing (interventions implemented as appropriate)   06/23/19 1646   Plan of Care Review   Progress no change   OTHER   Outcome Summary Pt continues with uncontrolled htn, prn and po hydralazine given, bp decreased some, pt has been restless with rest periods in between ambulating in [place] off and on all night. C/o HA relieved with prn tylenol.      Goal: Discharge Needs Assessment  Outcome: Ongoing (interventions implemented as appropriate)      Problem: Hypertensive Disease/Crisis (Arterial) (Adult)  Goal: Signs and Symptoms of Listed Potential Problems Will be Absent, Minimized or Managed (Hypertensive Disease/Crisis)  Outcome: Ongoing (interventions implemented as appropriate)      Problem: Pain, Chronic (Adult)  Goal: Acceptable Pain/Comfort Level and Functional Ability  Outcome: Ongoing (interventions implemented as appropriate)

## 2019-06-25 ENCOUNTER — HOSPITAL ENCOUNTER (OUTPATIENT)
Facility: HOSPITAL | Age: 64
Setting detail: OBSERVATION
Discharge: HOME | End: 2019-06-28
Attending: INTERNAL MEDICINE | Admitting: INTERNAL MEDICINE

## 2019-06-25 DIAGNOSIS — I10 ESSENTIAL HYPERTENSION: ICD-10-CM

## 2019-06-25 DIAGNOSIS — I21.09: Primary | ICD-10-CM

## 2019-06-25 LAB
ALBUMIN SERPL-MCNC: 3.7 G/DL (ref 3.5–5.2)
ALBUMIN/GLOB SERPL: 1.3 G/DL
ALP SERPL-CCNC: 48 U/L (ref 39–117)
ALT SERPL W P-5'-P-CCNC: 10 U/L (ref 1–41)
ANION GAP SERPL CALCULATED.3IONS-SCNC: 9 MMOL/L
AST SERPL-CCNC: 10 U/L (ref 1–40)
BASOPHILS # BLD AUTO: 0.03 10*3/MM3 (ref 0–0.2)
BASOPHILS NFR BLD AUTO: 0.2 % (ref 0–1.5)
BILIRUB SERPL-MCNC: 0.6 MG/DL (ref 0.2–1.2)
BUN BLD-MCNC: 31 MG/DL (ref 8–23)
BUN/CREAT SERPL: 26.3 (ref 7–25)
CALCIUM SPEC-SCNC: 9.3 MG/DL (ref 8.6–10.5)
CHLORIDE SERPL-SCNC: 104 MMOL/L (ref 98–107)
CO2 SERPL-SCNC: 24 MMOL/L (ref 22–29)
CREAT BLD-MCNC: 1.18 MG/DL (ref 0.76–1.27)
DEPRECATED RDW RBC AUTO: 44.4 FL (ref 37–54)
EOSINOPHIL # BLD AUTO: 0.03 10*3/MM3 (ref 0–0.4)
EOSINOPHIL NFR BLD AUTO: 0.2 % (ref 0.3–6.2)
ERYTHROCYTE [DISTWIDTH] IN BLOOD BY AUTOMATED COUNT: 12.8 % (ref 12.3–15.4)
GFR SERPL CREATININE-BSD FRML MDRD: 62 ML/MIN/1.73
GLOBULIN UR ELPH-MCNC: 2.9 GM/DL
GLUCOSE BLD-MCNC: 177 MG/DL (ref 65–99)
HCT VFR BLD AUTO: 43.6 % (ref 37.5–51)
HGB BLD-MCNC: 14.7 G/DL (ref 13–17.7)
HOLD SPECIMEN: NORMAL
HOLD SPECIMEN: NORMAL
IMM GRANULOCYTES # BLD AUTO: 0.04 10*3/MM3 (ref 0–0.05)
IMM GRANULOCYTES NFR BLD AUTO: 0.3 % (ref 0–0.5)
LYMPHOCYTES # BLD AUTO: 2.99 10*3/MM3 (ref 0.7–3.1)
LYMPHOCYTES NFR BLD AUTO: 21 % (ref 19.6–45.3)
MCH RBC QN AUTO: 31.8 PG (ref 26.6–33)
MCHC RBC AUTO-ENTMCNC: 33.7 G/DL (ref 31.5–35.7)
MCV RBC AUTO: 94.4 FL (ref 79–97)
MONOCYTES # BLD AUTO: 1.2 10*3/MM3 (ref 0.1–0.9)
MONOCYTES NFR BLD AUTO: 8.4 % (ref 5–12)
NEUTROPHILS # BLD AUTO: 9.95 10*3/MM3 (ref 1.7–7)
NEUTROPHILS NFR BLD AUTO: 69.9 % (ref 42.7–76)
PLATELET # BLD AUTO: 295 10*3/MM3 (ref 140–450)
PMV BLD AUTO: 9.9 FL (ref 6–12)
POTASSIUM BLD-SCNC: 4.1 MMOL/L (ref 3.5–5.2)
PROT SERPL-MCNC: 6.6 G/DL (ref 6–8.5)
RBC # BLD AUTO: 4.62 10*6/MM3 (ref 4.14–5.8)
SODIUM BLD-SCNC: 137 MMOL/L (ref 136–145)
TROPONIN T SERPL-MCNC: <0.01 NG/ML (ref 0–0.03)
TROPONIN T SERPL-MCNC: <0.01 NG/ML (ref 0–0.03)
WBC NRBC COR # BLD: 14.24 10*3/MM3 (ref 3.4–10.8)
WHOLE BLOOD HOLD SPECIMEN: NORMAL
WHOLE BLOOD HOLD SPECIMEN: NORMAL

## 2019-06-25 PROCEDURE — 93010 ELECTROCARDIOGRAM REPORT: CPT | Performed by: INTERNAL MEDICINE

## 2019-06-25 PROCEDURE — 25010000002 HEPARIN (PORCINE) PER 1000 UNITS: Performed by: INTERNAL MEDICINE

## 2019-06-25 PROCEDURE — 93458 L HRT ARTERY/VENTRICLE ANGIO: CPT | Performed by: INTERNAL MEDICINE

## 2019-06-25 PROCEDURE — 25010000002 MIDAZOLAM PER 1 MG: Performed by: INTERNAL MEDICINE

## 2019-06-25 PROCEDURE — 99284 EMERGENCY DEPT VISIT MOD MDM: CPT

## 2019-06-25 PROCEDURE — 0 IOPAMIDOL PER 1 ML: Performed by: INTERNAL MEDICINE

## 2019-06-25 PROCEDURE — 99152 MOD SED SAME PHYS/QHP 5/>YRS: CPT | Performed by: INTERNAL MEDICINE

## 2019-06-25 PROCEDURE — C1769 GUIDE WIRE: HCPCS | Performed by: INTERNAL MEDICINE

## 2019-06-25 PROCEDURE — 85025 COMPLETE CBC W/AUTO DIFF WBC: CPT

## 2019-06-25 PROCEDURE — 93005 ELECTROCARDIOGRAM TRACING: CPT

## 2019-06-25 PROCEDURE — G0378 HOSPITAL OBSERVATION PER HR: HCPCS

## 2019-06-25 PROCEDURE — 80053 COMPREHEN METABOLIC PANEL: CPT

## 2019-06-25 PROCEDURE — 25010000002 FENTANYL CITRATE (PF) 100 MCG/2ML SOLUTION: Performed by: INTERNAL MEDICINE

## 2019-06-25 PROCEDURE — 84484 ASSAY OF TROPONIN QUANT: CPT

## 2019-06-25 PROCEDURE — C1887 CATHETER, GUIDING: HCPCS | Performed by: INTERNAL MEDICINE

## 2019-06-25 PROCEDURE — C1894 INTRO/SHEATH, NON-LASER: HCPCS | Performed by: INTERNAL MEDICINE

## 2019-06-25 PROCEDURE — 36415 COLL VENOUS BLD VENIPUNCTURE: CPT

## 2019-06-25 PROCEDURE — 99220 PR INITIAL OBSERVATION CARE/DAY 70 MINUTES: CPT | Performed by: INTERNAL MEDICINE

## 2019-06-25 RX ORDER — ONDANSETRON 2 MG/ML
4 INJECTION INTRAMUSCULAR; INTRAVENOUS EVERY 6 HOURS PRN
Status: DISCONTINUED | OUTPATIENT
Start: 2019-06-25 | End: 2019-06-28 | Stop reason: HOSPADM

## 2019-06-25 RX ORDER — ACETAMINOPHEN 325 MG/1
650 TABLET ORAL EVERY 4 HOURS PRN
Status: DISCONTINUED | OUTPATIENT
Start: 2019-06-25 | End: 2019-06-28 | Stop reason: HOSPADM

## 2019-06-25 RX ORDER — AMLODIPINE BESYLATE 5 MG/1
5 TABLET ORAL
Status: DISCONTINUED | OUTPATIENT
Start: 2019-06-25 | End: 2019-06-26

## 2019-06-25 RX ORDER — SODIUM CHLORIDE 0.9 % (FLUSH) 0.9 %
10 SYRINGE (ML) INJECTION AS NEEDED
Status: DISCONTINUED | OUTPATIENT
Start: 2019-06-25 | End: 2019-06-28 | Stop reason: HOSPADM

## 2019-06-25 RX ORDER — ASPIRIN 325 MG
325 TABLET ORAL ONCE
Status: COMPLETED | OUTPATIENT
Start: 2019-06-25 | End: 2019-06-25

## 2019-06-25 RX ORDER — FENTANYL CITRATE 50 UG/ML
INJECTION, SOLUTION INTRAMUSCULAR; INTRAVENOUS AS NEEDED
Status: DISCONTINUED | OUTPATIENT
Start: 2019-06-25 | End: 2019-06-25 | Stop reason: HOSPADM

## 2019-06-25 RX ORDER — MIDAZOLAM HYDROCHLORIDE 1 MG/ML
INJECTION INTRAMUSCULAR; INTRAVENOUS AS NEEDED
Status: DISCONTINUED | OUTPATIENT
Start: 2019-06-25 | End: 2019-06-25 | Stop reason: HOSPADM

## 2019-06-25 RX ORDER — NITROGLYCERIN 0.4 MG/1
0.4 TABLET SUBLINGUAL
Status: DISCONTINUED | OUTPATIENT
Start: 2019-06-25 | End: 2019-06-28 | Stop reason: HOSPADM

## 2019-06-25 RX ORDER — NALOXONE HCL 0.4 MG/ML
0.4 VIAL (ML) INJECTION
Status: DISCONTINUED | OUTPATIENT
Start: 2019-06-25 | End: 2019-06-28 | Stop reason: HOSPADM

## 2019-06-25 RX ORDER — SODIUM CHLORIDE 9 MG/ML
INJECTION, SOLUTION INTRAVENOUS CONTINUOUS PRN
Status: COMPLETED | OUTPATIENT
Start: 2019-06-25 | End: 2019-06-25

## 2019-06-25 RX ORDER — MORPHINE SULFATE 2 MG/ML
1 INJECTION, SOLUTION INTRAMUSCULAR; INTRAVENOUS EVERY 4 HOURS PRN
Status: DISCONTINUED | OUTPATIENT
Start: 2019-06-25 | End: 2019-06-28 | Stop reason: HOSPADM

## 2019-06-25 RX ORDER — ASPIRIN 81 MG/1
81 TABLET ORAL DAILY
Status: DISCONTINUED | OUTPATIENT
Start: 2019-06-25 | End: 2019-06-28 | Stop reason: HOSPADM

## 2019-06-25 RX ORDER — ONDANSETRON 4 MG/1
4 TABLET, FILM COATED ORAL EVERY 6 HOURS PRN
Status: DISCONTINUED | OUTPATIENT
Start: 2019-06-25 | End: 2019-06-28 | Stop reason: HOSPADM

## 2019-06-25 RX ORDER — LIDOCAINE HYDROCHLORIDE 20 MG/ML
INJECTION, SOLUTION INFILTRATION; PERINEURAL AS NEEDED
Status: DISCONTINUED | OUTPATIENT
Start: 2019-06-25 | End: 2019-06-25 | Stop reason: HOSPADM

## 2019-06-25 RX ORDER — HYDROCODONE BITARTRATE AND ACETAMINOPHEN 5; 325 MG/1; MG/1
1 TABLET ORAL EVERY 4 HOURS PRN
Status: DISCONTINUED | OUTPATIENT
Start: 2019-06-25 | End: 2019-06-28 | Stop reason: HOSPADM

## 2019-06-25 RX ORDER — HYDRALAZINE HYDROCHLORIDE 50 MG/1
50 TABLET, FILM COATED ORAL EVERY 12 HOURS SCHEDULED
Status: DISCONTINUED | OUTPATIENT
Start: 2019-06-25 | End: 2019-06-28 | Stop reason: HOSPADM

## 2019-06-25 RX ORDER — SODIUM CHLORIDE 9 MG/ML
75 INJECTION, SOLUTION INTRAVENOUS CONTINUOUS
Status: ACTIVE | OUTPATIENT
Start: 2019-06-25 | End: 2019-06-25

## 2019-06-25 RX ADMIN — AMLODIPINE BESYLATE 5 MG: 5 TABLET ORAL at 18:04

## 2019-06-25 RX ADMIN — HYDROCODONE BITARTRATE AND ACETAMINOPHEN 1 TABLET: 5; 325 TABLET ORAL at 20:32

## 2019-06-25 RX ADMIN — HYDRALAZINE HYDROCHLORIDE 50 MG: 50 TABLET, FILM COATED ORAL at 20:36

## 2019-06-25 RX ADMIN — NITROGLYCERIN 0.4 MG: 0.4 TABLET SUBLINGUAL at 14:59

## 2019-06-25 RX ADMIN — ASPIRIN 81 MG: 81 TABLET, COATED ORAL at 18:04

## 2019-06-25 RX ADMIN — ASPIRIN 243 MG: 325 TABLET ORAL at 14:45

## 2019-06-26 ENCOUNTER — APPOINTMENT (OUTPATIENT)
Dept: CT IMAGING | Facility: HOSPITAL | Age: 64
End: 2019-06-26

## 2019-06-26 ENCOUNTER — APPOINTMENT (OUTPATIENT)
Dept: NEUROLOGY | Facility: HOSPITAL | Age: 64
End: 2019-06-26

## 2019-06-26 LAB
AMPHET+METHAMPHET UR QL: NEGATIVE
ANION GAP SERPL CALCULATED.3IONS-SCNC: 9.5 MMOL/L
BARBITURATES UR QL SCN: NEGATIVE
BENZODIAZ UR QL SCN: POSITIVE
BUN BLD-MCNC: 30 MG/DL (ref 8–23)
BUN/CREAT SERPL: 25.2 (ref 7–25)
CALCIUM SPEC-SCNC: 9 MG/DL (ref 8.6–10.5)
CANNABINOIDS SERPL QL: POSITIVE
CHLORIDE SERPL-SCNC: 103 MMOL/L (ref 98–107)
CHOLEST SERPL-MCNC: 137 MG/DL (ref 0–200)
CO2 SERPL-SCNC: 23.5 MMOL/L (ref 22–29)
COCAINE UR QL: NEGATIVE
CREAT BLD-MCNC: 1.19 MG/DL (ref 0.76–1.27)
GFR SERPL CREATININE-BSD FRML MDRD: 62 ML/MIN/1.73
GLUCOSE BLD-MCNC: 134 MG/DL (ref 65–99)
HBA1C MFR BLD: 6.58 % (ref 4.8–5.6)
HDLC SERPL-MCNC: 33 MG/DL (ref 40–60)
LDLC SERPL CALC-MCNC: 80 MG/DL (ref 0–100)
LDLC/HDLC SERPL: 2.42 {RATIO}
METHADONE UR QL SCN: NEGATIVE
OPIATES UR QL: POSITIVE
OXYCODONE UR QL SCN: NEGATIVE
POTASSIUM BLD-SCNC: 3.8 MMOL/L (ref 3.5–5.2)
SODIUM BLD-SCNC: 136 MMOL/L (ref 136–145)
T4 FREE SERPL-MCNC: 1.3 NG/DL (ref 0.93–1.7)
TRIGL SERPL-MCNC: 120 MG/DL (ref 0–150)
TSH SERPL DL<=0.05 MIU/L-ACNC: 2.42 MIU/ML (ref 0.27–4.2)
VLDLC SERPL-MCNC: 24 MG/DL (ref 5–40)

## 2019-06-26 PROCEDURE — 0 IOPAMIDOL PER 1 ML: Performed by: INTERNAL MEDICINE

## 2019-06-26 PROCEDURE — 70470 CT HEAD/BRAIN W/O & W/DYE: CPT

## 2019-06-26 PROCEDURE — G0378 HOSPITAL OBSERVATION PER HR: HCPCS

## 2019-06-26 PROCEDURE — 80307 DRUG TEST PRSMV CHEM ANLYZR: CPT | Performed by: INTERNAL MEDICINE

## 2019-06-26 PROCEDURE — 25010000002 ENOXAPARIN PER 10 MG: Performed by: INTERNAL MEDICINE

## 2019-06-26 PROCEDURE — 84443 ASSAY THYROID STIM HORMONE: CPT | Performed by: PSYCHIATRY & NEUROLOGY

## 2019-06-26 PROCEDURE — 80048 BASIC METABOLIC PNL TOTAL CA: CPT | Performed by: INTERNAL MEDICINE

## 2019-06-26 PROCEDURE — 80061 LIPID PANEL: CPT | Performed by: INTERNAL MEDICINE

## 2019-06-26 PROCEDURE — 99244 OFF/OP CNSLTJ NEW/EST MOD 40: CPT | Performed by: PSYCHIATRY & NEUROLOGY

## 2019-06-26 PROCEDURE — 84439 ASSAY OF FREE THYROXINE: CPT | Performed by: PSYCHIATRY & NEUROLOGY

## 2019-06-26 PROCEDURE — 95816 EEG AWAKE AND DROWSY: CPT

## 2019-06-26 PROCEDURE — 99225 PR SBSQ OBSERVATION CARE/DAY 25 MINUTES: CPT | Performed by: INTERNAL MEDICINE

## 2019-06-26 PROCEDURE — 95819 EEG AWAKE AND ASLEEP: CPT | Performed by: PSYCHIATRY & NEUROLOGY

## 2019-06-26 PROCEDURE — 83036 HEMOGLOBIN GLYCOSYLATED A1C: CPT | Performed by: INTERNAL MEDICINE

## 2019-06-26 RX ORDER — LISINOPRIL 10 MG/1
10 TABLET ORAL
Status: DISCONTINUED | OUTPATIENT
Start: 2019-06-26 | End: 2019-06-27

## 2019-06-26 RX ORDER — METOPROLOL SUCCINATE 25 MG/1
25 TABLET, EXTENDED RELEASE ORAL
Status: DISCONTINUED | OUTPATIENT
Start: 2019-06-26 | End: 2019-06-28 | Stop reason: HOSPADM

## 2019-06-26 RX ORDER — AMLODIPINE BESYLATE 10 MG/1
10 TABLET ORAL
Status: DISCONTINUED | OUTPATIENT
Start: 2019-06-26 | End: 2019-06-28 | Stop reason: HOSPADM

## 2019-06-26 RX ADMIN — HYDRALAZINE HYDROCHLORIDE 50 MG: 50 TABLET, FILM COATED ORAL at 22:08

## 2019-06-26 RX ADMIN — HYDROCODONE BITARTRATE AND ACETAMINOPHEN 1 TABLET: 5; 325 TABLET ORAL at 00:42

## 2019-06-26 RX ADMIN — METOPROLOL SUCCINATE 25 MG: 25 TABLET, FILM COATED, EXTENDED RELEASE ORAL at 10:06

## 2019-06-26 RX ADMIN — LISINOPRIL 10 MG: 10 TABLET ORAL at 20:08

## 2019-06-26 RX ADMIN — ACETAMINOPHEN 650 MG: 325 TABLET, FILM COATED ORAL at 10:08

## 2019-06-26 RX ADMIN — AMLODIPINE BESYLATE 10 MG: 10 TABLET ORAL at 08:19

## 2019-06-26 RX ADMIN — ASPIRIN 81 MG: 81 TABLET, COATED ORAL at 08:19

## 2019-06-26 RX ADMIN — ENOXAPARIN SODIUM 40 MG: 40 INJECTION SUBCUTANEOUS at 10:08

## 2019-06-26 RX ADMIN — IOPAMIDOL 50 ML: 755 INJECTION, SOLUTION INTRAVENOUS at 14:00

## 2019-06-26 RX ADMIN — HYDRALAZINE HYDROCHLORIDE 50 MG: 50 TABLET, FILM COATED ORAL at 08:19

## 2019-06-26 RX ADMIN — ACETAMINOPHEN 650 MG: 325 TABLET, FILM COATED ORAL at 14:09

## 2019-06-27 LAB
ANION GAP SERPL CALCULATED.3IONS-SCNC: 15 MMOL/L (ref 5–15)
BUN BLD-MCNC: 33 MG/DL (ref 8–23)
BUN/CREAT SERPL: 25.6 (ref 7–25)
CALCIUM SPEC-SCNC: 8.9 MG/DL (ref 8.6–10.5)
CHLORIDE SERPL-SCNC: 99 MMOL/L (ref 98–107)
CO2 SERPL-SCNC: 21 MMOL/L (ref 22–29)
CREAT BLD-MCNC: 1.29 MG/DL (ref 0.76–1.27)
ERYTHROCYTE [SEDIMENTATION RATE] IN BLOOD: 7 MM/HR (ref 0–20)
FOLATE SERPL-MCNC: 11.9 NG/ML (ref 4.78–24.2)
GFR SERPL CREATININE-BSD FRML MDRD: 56 ML/MIN/1.73
GLUCOSE BLD-MCNC: 156 MG/DL (ref 65–99)
POTASSIUM BLD-SCNC: 4.4 MMOL/L (ref 3.5–5.2)
SODIUM BLD-SCNC: 135 MMOL/L (ref 136–145)
VIT B12 BLD-MCNC: 482 PG/ML (ref 211–946)

## 2019-06-27 PROCEDURE — 93005 ELECTROCARDIOGRAM TRACING: CPT | Performed by: INTERNAL MEDICINE

## 2019-06-27 PROCEDURE — 25010000002 ZIPRASIDONE MESYLATE PER 10 MG: Performed by: PSYCHIATRY & NEUROLOGY

## 2019-06-27 PROCEDURE — 82746 ASSAY OF FOLIC ACID SERUM: CPT | Performed by: PSYCHIATRY & NEUROLOGY

## 2019-06-27 PROCEDURE — 93010 ELECTROCARDIOGRAM REPORT: CPT | Performed by: INTERNAL MEDICINE

## 2019-06-27 PROCEDURE — 97165 OT EVAL LOW COMPLEX 30 MIN: CPT

## 2019-06-27 PROCEDURE — 85652 RBC SED RATE AUTOMATED: CPT | Performed by: PSYCHIATRY & NEUROLOGY

## 2019-06-27 PROCEDURE — 97161 PT EVAL LOW COMPLEX 20 MIN: CPT

## 2019-06-27 PROCEDURE — 86592 SYPHILIS TEST NON-TREP QUAL: CPT | Performed by: PSYCHIATRY & NEUROLOGY

## 2019-06-27 PROCEDURE — 99213 OFFICE O/P EST LOW 20 MIN: CPT | Performed by: PSYCHIATRY & NEUROLOGY

## 2019-06-27 PROCEDURE — 80048 BASIC METABOLIC PNL TOTAL CA: CPT | Performed by: INTERNAL MEDICINE

## 2019-06-27 PROCEDURE — 25010000002 ENOXAPARIN PER 10 MG: Performed by: INTERNAL MEDICINE

## 2019-06-27 PROCEDURE — 97110 THERAPEUTIC EXERCISES: CPT

## 2019-06-27 PROCEDURE — G0378 HOSPITAL OBSERVATION PER HR: HCPCS

## 2019-06-27 PROCEDURE — 92610 EVALUATE SWALLOWING FUNCTION: CPT | Performed by: SPEECH-LANGUAGE PATHOLOGIST

## 2019-06-27 PROCEDURE — 99225 PR SBSQ OBSERVATION CARE/DAY 25 MINUTES: CPT | Performed by: INTERNAL MEDICINE

## 2019-06-27 PROCEDURE — 82607 VITAMIN B-12: CPT | Performed by: PSYCHIATRY & NEUROLOGY

## 2019-06-27 PROCEDURE — 97535 SELF CARE MNGMENT TRAINING: CPT

## 2019-06-27 RX ORDER — LISINOPRIL 20 MG/1
20 TABLET ORAL
Status: DISCONTINUED | OUTPATIENT
Start: 2019-06-27 | End: 2019-06-28

## 2019-06-27 RX ORDER — LORAZEPAM 2 MG/ML
2 INJECTION INTRAMUSCULAR EVERY 4 HOURS PRN
Status: DISCONTINUED | OUTPATIENT
Start: 2019-06-27 | End: 2019-06-28 | Stop reason: HOSPADM

## 2019-06-27 RX ORDER — ZIPRASIDONE MESYLATE 20 MG/ML
10 INJECTION, POWDER, LYOPHILIZED, FOR SOLUTION INTRAMUSCULAR ONCE
Status: COMPLETED | OUTPATIENT
Start: 2019-06-27 | End: 2019-06-27

## 2019-06-27 RX ADMIN — HYDRALAZINE HYDROCHLORIDE 50 MG: 50 TABLET, FILM COATED ORAL at 09:02

## 2019-06-27 RX ADMIN — ASPIRIN 81 MG: 81 TABLET, COATED ORAL at 09:02

## 2019-06-27 RX ADMIN — METOPROLOL SUCCINATE 25 MG: 25 TABLET, FILM COATED, EXTENDED RELEASE ORAL at 09:02

## 2019-06-27 RX ADMIN — ENOXAPARIN SODIUM 40 MG: 40 INJECTION SUBCUTANEOUS at 09:02

## 2019-06-27 RX ADMIN — ZIPRASIDONE MESYLATE 10 MG: 20 INJECTION, POWDER, LYOPHILIZED, FOR SOLUTION INTRAMUSCULAR at 18:28

## 2019-06-27 RX ADMIN — LISINOPRIL 20 MG: 20 TABLET ORAL at 09:02

## 2019-06-27 RX ADMIN — HYDRALAZINE HYDROCHLORIDE 50 MG: 50 TABLET, FILM COATED ORAL at 20:47

## 2019-06-27 RX ADMIN — AMLODIPINE BESYLATE 10 MG: 10 TABLET ORAL at 09:02

## 2019-06-28 ENCOUNTER — APPOINTMENT (OUTPATIENT)
Dept: CARDIOLOGY | Facility: HOSPITAL | Age: 64
End: 2019-06-28

## 2019-06-28 VITALS
HEART RATE: 56 BPM | OXYGEN SATURATION: 97 % | WEIGHT: 185 LBS | TEMPERATURE: 97.7 F | HEIGHT: 68 IN | RESPIRATION RATE: 16 BRPM | SYSTOLIC BLOOD PRESSURE: 128 MMHG | DIASTOLIC BLOOD PRESSURE: 70 MMHG | BODY MASS INDEX: 28.04 KG/M2

## 2019-06-28 LAB
ANION GAP SERPL CALCULATED.3IONS-SCNC: 9.7 MMOL/L (ref 5–15)
BH CV XLRA MEAS LEFT CCA RATIO VEL: 93.8 CM/SEC
BH CV XLRA MEAS LEFT DIST CCA EDV: 18.2 CM/SEC
BH CV XLRA MEAS LEFT DIST CCA PSV: 93.8 CM/SEC
BH CV XLRA MEAS LEFT DIST ICA EDV: -20 CM/SEC
BH CV XLRA MEAS LEFT DIST ICA PSV: -48.7 CM/SEC
BH CV XLRA MEAS LEFT ICA RATIO VEL: -56.2 CM/SEC
BH CV XLRA MEAS LEFT ICA/CCA RATIO: -0.6
BH CV XLRA MEAS LEFT MID CCA EDV: 16.4 CM/SEC
BH CV XLRA MEAS LEFT MID CCA PSV: 93.8 CM/SEC
BH CV XLRA MEAS LEFT MID ICA EDV: -22.8 CM/SEC
BH CV XLRA MEAS LEFT MID ICA PSV: -56.2 CM/SEC
BH CV XLRA MEAS LEFT PROX CCA EDV: 15.2 CM/SEC
BH CV XLRA MEAS LEFT PROX CCA PSV: 106 CM/SEC
BH CV XLRA MEAS LEFT PROX ECA EDV: -9.4 CM/SEC
BH CV XLRA MEAS LEFT PROX ECA PSV: -99.1 CM/SEC
BH CV XLRA MEAS LEFT PROX ICA EDV: -9.4 CM/SEC
BH CV XLRA MEAS LEFT PROX ICA PSV: -51.5 CM/SEC
BH CV XLRA MEAS LEFT PROX SCLA PSV: 147 CM/SEC
BH CV XLRA MEAS LEFT VERTEBRAL A EDV: -12.4 CM/SEC
BH CV XLRA MEAS LEFT VERTEBRAL A PSV: -35.5 CM/SEC
BH CV XLRA MEAS RIGHT CCA RATIO VEL: -87.4 CM/SEC
BH CV XLRA MEAS RIGHT DIST CCA EDV: -15.2 CM/SEC
BH CV XLRA MEAS RIGHT DIST CCA PSV: -87.4 CM/SEC
BH CV XLRA MEAS RIGHT DIST ICA EDV: -11.4 CM/SEC
BH CV XLRA MEAS RIGHT DIST ICA PSV: -40.7 CM/SEC
BH CV XLRA MEAS RIGHT ICA RATIO VEL: -66 CM/SEC
BH CV XLRA MEAS RIGHT ICA/CCA RATIO: 0.76
BH CV XLRA MEAS RIGHT MID CCA EDV: 12.3 CM/SEC
BH CV XLRA MEAS RIGHT MID CCA PSV: 90.3 CM/SEC
BH CV XLRA MEAS RIGHT MID ICA EDV: 12 CM/SEC
BH CV XLRA MEAS RIGHT MID ICA PSV: 35.2 CM/SEC
BH CV XLRA MEAS RIGHT PROX CCA EDV: 9.4 CM/SEC
BH CV XLRA MEAS RIGHT PROX CCA PSV: 78.6 CM/SEC
BH CV XLRA MEAS RIGHT PROX ECA EDV: -8.2 CM/SEC
BH CV XLRA MEAS RIGHT PROX ECA PSV: -107 CM/SEC
BH CV XLRA MEAS RIGHT PROX ICA EDV: -21.7 CM/SEC
BH CV XLRA MEAS RIGHT PROX ICA PSV: -66 CM/SEC
BH CV XLRA MEAS RIGHT PROX SCLA PSV: 193 CM/SEC
BH CV XLRA MEAS RIGHT VERTEBRAL A EDV: -13.8 CM/SEC
BH CV XLRA MEAS RIGHT VERTEBRAL A PSV: -42.4 CM/SEC
BUN BLD-MCNC: 24 MG/DL (ref 8–23)
BUN/CREAT SERPL: 22 (ref 7–25)
CALCIUM SPEC-SCNC: 9.4 MG/DL (ref 8.6–10.5)
CHLORIDE SERPL-SCNC: 101 MMOL/L (ref 98–107)
CO2 SERPL-SCNC: 25.3 MMOL/L (ref 22–29)
CREAT BLD-MCNC: 1.09 MG/DL (ref 0.76–1.27)
GFR SERPL CREATININE-BSD FRML MDRD: 68 ML/MIN/1.73
GLUCOSE BLD-MCNC: 144 MG/DL (ref 65–99)
POTASSIUM BLD-SCNC: 3.9 MMOL/L (ref 3.5–5.2)
RPR SER QL: NORMAL
SODIUM BLD-SCNC: 136 MMOL/L (ref 136–145)

## 2019-06-28 PROCEDURE — 80048 BASIC METABOLIC PNL TOTAL CA: CPT | Performed by: INTERNAL MEDICINE

## 2019-06-28 PROCEDURE — 25010000002 ENOXAPARIN PER 10 MG: Performed by: INTERNAL MEDICINE

## 2019-06-28 PROCEDURE — 99217 PR OBSERVATION CARE DISCHARGE MANAGEMENT: CPT | Performed by: INTERNAL MEDICINE

## 2019-06-28 PROCEDURE — 99213 OFFICE O/P EST LOW 20 MIN: CPT | Performed by: PSYCHIATRY & NEUROLOGY

## 2019-06-28 PROCEDURE — 93005 ELECTROCARDIOGRAM TRACING: CPT | Performed by: PSYCHIATRY & NEUROLOGY

## 2019-06-28 PROCEDURE — G0378 HOSPITAL OBSERVATION PER HR: HCPCS

## 2019-06-28 PROCEDURE — 93010 ELECTROCARDIOGRAM REPORT: CPT | Performed by: INTERNAL MEDICINE

## 2019-06-28 PROCEDURE — 99225 PR SBSQ OBSERVATION CARE/DAY 25 MINUTES: CPT | Performed by: INTERNAL MEDICINE

## 2019-06-28 PROCEDURE — 93880 EXTRACRANIAL BILAT STUDY: CPT

## 2019-06-28 RX ORDER — LISINOPRIL 20 MG/1
20 TABLET ORAL ONCE
Status: COMPLETED | OUTPATIENT
Start: 2019-06-28 | End: 2019-06-28

## 2019-06-28 RX ORDER — AMLODIPINE BESYLATE 10 MG/1
10 TABLET ORAL
Qty: 30 TABLET | Refills: 5 | Status: SHIPPED | OUTPATIENT
Start: 2019-06-28 | End: 2020-05-27 | Stop reason: HOSPADM

## 2019-06-28 RX ORDER — ATORVASTATIN CALCIUM 20 MG/1
20 TABLET, FILM COATED ORAL DAILY
Qty: 30 TABLET | Refills: 5 | Status: SHIPPED | OUTPATIENT
Start: 2019-06-28 | End: 2020-11-20 | Stop reason: HOSPADM

## 2019-06-28 RX ORDER — METOPROLOL SUCCINATE 25 MG/1
25 TABLET, EXTENDED RELEASE ORAL
Qty: 30 TABLET | Refills: 5 | Status: SHIPPED | OUTPATIENT
Start: 2019-06-29 | End: 2019-09-28 | Stop reason: HOSPADM

## 2019-06-28 RX ORDER — LISINOPRIL 40 MG/1
40 TABLET ORAL
Qty: 30 TABLET | Refills: 5 | Status: SHIPPED | OUTPATIENT
Start: 2019-06-29 | End: 2020-11-20 | Stop reason: HOSPADM

## 2019-06-28 RX ORDER — LISINOPRIL 40 MG/1
40 TABLET ORAL
Status: DISCONTINUED | OUTPATIENT
Start: 2019-06-29 | End: 2019-06-28 | Stop reason: HOSPADM

## 2019-06-28 RX ADMIN — METOPROLOL SUCCINATE 25 MG: 25 TABLET, FILM COATED, EXTENDED RELEASE ORAL at 08:32

## 2019-06-28 RX ADMIN — ENOXAPARIN SODIUM 40 MG: 40 INJECTION SUBCUTANEOUS at 14:30

## 2019-06-28 RX ADMIN — AMLODIPINE BESYLATE 10 MG: 10 TABLET ORAL at 08:32

## 2019-06-28 RX ADMIN — LISINOPRIL 20 MG: 20 TABLET ORAL at 08:32

## 2019-06-28 RX ADMIN — HYDRALAZINE HYDROCHLORIDE 50 MG: 50 TABLET, FILM COATED ORAL at 08:32

## 2019-06-28 RX ADMIN — LISINOPRIL 20 MG: 20 TABLET ORAL at 14:24

## 2019-06-28 RX ADMIN — ASPIRIN 81 MG: 81 TABLET, COATED ORAL at 08:32

## 2019-07-02 ENCOUNTER — OFFICE VISIT (OUTPATIENT)
Dept: CARDIOLOGY | Facility: CLINIC | Age: 64
End: 2019-07-02

## 2019-07-02 VITALS
HEIGHT: 68 IN | HEART RATE: 51 BPM | BODY MASS INDEX: 24.25 KG/M2 | WEIGHT: 160 LBS | SYSTOLIC BLOOD PRESSURE: 92 MMHG | DIASTOLIC BLOOD PRESSURE: 58 MMHG

## 2019-07-02 DIAGNOSIS — I10 ESSENTIAL HYPERTENSION: Primary | ICD-10-CM

## 2019-07-02 DIAGNOSIS — I63.9 CEREBROVASCULAR ACCIDENT (CVA), UNSPECIFIED MECHANISM (HCC): ICD-10-CM

## 2019-07-02 PROCEDURE — 99214 OFFICE O/P EST MOD 30 MIN: CPT | Performed by: PHYSICIAN ASSISTANT

## 2019-07-02 PROCEDURE — 93000 ELECTROCARDIOGRAM COMPLETE: CPT | Performed by: PHYSICIAN ASSISTANT

## 2019-07-02 NOTE — PROGRESS NOTES
Date of Office Visit: 2019  Encounter Provider: KATIE Sin  Place of Service: [facility] CARDIOLOGY  Patient Name: Trever Petit  :1955    Chief Complaint   Patient presents with   • Hypertension   :     HPI: Trever Petit is a 63 y.o. male, new to me, who presents today for follow-up.  Old records have been obtained and reviewed by me.  He is a patient with minimal documented medical history who presented to the emergency room on 2019 with elevated blood pressure and was found to be in a hypertensive urgency.  He had reportedly stopped clonidine on his own secondary to side effects.  He did have ST elevation in his anterior leads but he was not having chest pain and his troponins were negative.  It was felt that his ST elevation was because of his hypertension.  He was started on hydralazine and amlodipine.  He had an echocardiogram that showed normal LV function with an EF of 68%, moderate LVH, and no valvular abnormalities.  He was discharged home on metoprolol, amlodipine, and hydralazine.  He then came back to the ER on 2019 with chest pain associated with diaphoresis and shortness of breath.  He eventually underwent cardiac catheterization.  This showed mild nonobstructive coronary disease.  He was noted to be a poor historian, and family members indicated that he had been having increasing memory issues.  He was seen by neurology and a CT scan of the head showed evidence of multiple prior strokes that were felt to be from poorly controlled hypertension.  An EEG was unremarkable and his dementia work-up was negative.  A carotid ultrasound showed no evidence of significant carotid disease.  His agitation continued throughout his admission and he even required a sitter at times.  The family declined further neuropsychological testing and placement, and he was eventually discharged home with family.  He is here today for follow-up.   "Recommendations were also to get the patient established with a primary care physician.   He is here today with his brother.  His blood pressure at home has been running in the 120s over 60s according to his brother.  He lives with his brother, he is a fantastic caregiver.  He has been watching a low-sodium diet.  He denies any chest pain, shortness of breath, palpitations, edema, dizziness, or syncope.  He has been compliant with his medications.  In regards to his agitation, the brother states that at one point the patient was \"locked up\", and does not like being confined.  The brother thinks that this is the reason why he was so agitated when he was in the hospital.  His agitation has resolved.      Past Medical History:   Diagnosis Date   • Arthritis    • Carpal bone fracture    • Carpal tunnel syndrome    • Constipation    • COPD (chronic obstructive pulmonary disease) (CMS/[facility])    • Coronary artery disease    • Diabetes mellitus (CMS/[facility])    • Elevated cholesterol    • Hypertension    • Myocardial infarction (CMS/[facility])    • Neuropathy    • Spinal stenosis    • Stroke (CMS/[facility])        Past Surgical History:   Procedure Laterality Date   • CARDIAC CATHETERIZATION N/A 6/25/2019    Procedure: Left Heart Cath;  Surgeon: Chen Aguilar MD;  Location:  [facility] CATH INVASIVE LOCATION;  Service: Cardiovascular   • CARDIAC CATHETERIZATION N/A 6/25/2019    Procedure: Left ventriculography;  Surgeon: Chen Augilar MD;  Location:  [facility] CATH INVASIVE LOCATION;  Service: Cardiovascular   • CARDIAC CATHETERIZATION N/A 6/25/2019    Procedure: Coronary angiography;  Surgeon: Chen Aguilar MD;  Location:  [facility] CATH INVASIVE LOCATION;  Service: Cardiovascular   • CARPAL TUNNEL RELEASE     • CERVICAL FUSION     • NECK SURGERY         Social History     Socioeconomic History   • Marital status: Single     Spouse name: Not on file   • Number of children: Not on file   • Years of education: Not on file   • Highest education " level: Not on file   Tobacco Use   • Smoking status: Never Smoker   Substance and Sexual Activity   • Alcohol use: No     Frequency: Never   • Drug use: Yes     Types: Marijuana   • Sexual activity: Defer       No family history on file.    Review of Systems   Constitution: Negative for chills, fever and malaise/fatigue.   Cardiovascular: Negative for chest pain, dyspnea on exertion, leg swelling, near-syncope, orthopnea, palpitations, paroxysmal nocturnal dyspnea and syncope.   Respiratory: Negative for cough and shortness of breath.    Musculoskeletal: Negative for joint pain, joint swelling and myalgias.   Gastrointestinal: Negative for abdominal pain, diarrhea, melena, nausea and vomiting.   Genitourinary: Negative for frequency and hematuria.   Neurological: Negative for light-headedness, numbness, paresthesias and seizures.   Allergic/Immunologic: Negative.    All other systems reviewed and are negative.      Allergies   Allergen Reactions   • Lisinopril Other (See Comments)     Liver gets inflamed per pt   • Clonidine Derivatives Unknown (See Comments)     .   • Hydrochlorothiazide Other (See Comments)     PT STATES CANT URINATE   • Insulin Lispro Unknown (See Comments)   • Metformin Unknown (See Comments)   • Sitagliptin Nausea And Vomiting         Current Outpatient Medications:   •  amLODIPine (NORVASC) 10 MG tablet, Take 1 tablet by mouth Daily., Disp: 30 tablet, Rfl: 5  •  aspirin 81 MG EC tablet, Take 81 mg by mouth Daily., Disp: , Rfl:   •  atorvastatin (LIPITOR) 20 MG tablet, Take 1 tablet by mouth Daily., Disp: 30 tablet, Rfl: 5  •  hydrALAZINE (APRESOLINE) 50 MG tablet, Take 1 tablet by mouth Every 12 (Twelve) Hours., Disp: 60 tablet, Rfl: 11  •  lisinopril (PRINIVIL,ZESTRIL) 40 MG tablet, Take 1 tablet by mouth Daily., Disp: 30 tablet, Rfl: 5  •  metoprolol succinate XL (TOPROL-XL) 25 MG 24 hr tablet, Take 1 tablet by mouth Daily., Disp: 30 tablet, Rfl: 5      Objective:     Vitals:    07/02/19  "1238 07/02/19 1239   BP: 94/58 92/58   BP Location: Right arm Left arm   Pulse: 51    Weight: 72.6 kg (160 lb)    Height: 172.7 cm (68\")      Body mass index is 24.33 kg/m².    PHYSICAL EXAM:    Physical Exam   Constitutional: He is oriented to person, place, and time. He appears well-developed and well-nourished. No distress.   HENT:   Head: Normocephalic and atraumatic.   Eyes: Pupils are equal, round, and reactive to light.   Neck: No JVD present. No thyromegaly present.   Cardiovascular: Normal rate, regular rhythm and intact distal pulses.   Murmur heard.   Harsh midsystolic murmur is present with a grade of 2/6 at the upper right sternal border radiating to the neck.  Pulmonary/Chest: Effort normal and breath sounds normal. No respiratory distress.   Abdominal: Soft. Bowel sounds are normal. He exhibits no distension. There is no splenomegaly or hepatomegaly. There is no tenderness.   Musculoskeletal: Normal range of motion. He exhibits no edema.   Neurological: He is alert and oriented to person, place, and time.   Skin: Skin is warm and dry. He is not diaphoretic. No erythema.   Psychiatric: He has a normal mood and affect. His behavior is normal. Judgment normal.         ECG 12 Lead  Date/Time: 7/2/2019 12:46 PM  Performed by: Ronit Arango PA  Authorized by: Ronit Arango PA   Comparison: compared with previous ECG from 6/28/2019  Similar to previous ECG  Rhythm: sinus rhythm  BPM: 51  Conduction: right bundle branch block    Clinical impression: abnormal EKG  Comments: Indication: Hypertension              Assessment:       Diagnosis Plan   1. Essential hypertension  ECG 12 Lead   2. Cerebrovascular accident (CVA), unspecified mechanism (CMS/HCC)  ECG 12 Lead     Orders Placed This Encounter   Procedures   • ECG 12 Lead     This order was created via procedure documentation          Plan:       1.  Hypertension.  His blood pressure is fantastic today.  It is 100/60 on recheck.  At home his brother " states it has been in the 120s over 60s.  We may be able to wean him off of some of his medications.  However at this time I am going to keep him on his current medical regimen.  I have asked his brother to bring his blood pressure cuff and a log of his blood pressures with him to his follow-up appointment with Dr. Aguilar in 1 month.    2.  History of stroke.  This was felt to be secondary to hypertensive episodes in the past.  His agitation was, according to the brother, because he does not like to be confined.  He is a poor historian although he is not agitated today on exam.  His brother states his mood is been better at home.  Continue current medical regimen.    I am not going to make any changes and he will follow-up with Dr. Aguilar in 1 month or sooner if needed.    As always, it has been a pleasure to participate in your patient's care.      Sincerely,         Ronit Arango PA-C

## 2019-07-21 ENCOUNTER — HOSPITAL ENCOUNTER (EMERGENCY)
Facility: HOSPITAL | Age: 64
Discharge: HOME | End: 2019-07-21
Attending: EMERGENCY MEDICINE | Admitting: EMERGENCY MEDICINE

## 2019-07-21 ENCOUNTER — APPOINTMENT (OUTPATIENT)
Dept: CT IMAGING | Facility: HOSPITAL | Age: 64
End: 2019-07-21

## 2019-07-21 VITALS
DIASTOLIC BLOOD PRESSURE: 81 MMHG | TEMPERATURE: 97.5 F | OXYGEN SATURATION: 100 % | WEIGHT: 158.7 LBS | SYSTOLIC BLOOD PRESSURE: 157 MMHG | BODY MASS INDEX: 24.05 KG/M2 | RESPIRATION RATE: 16 BRPM | HEART RATE: 50 BPM | HEIGHT: 68 IN

## 2019-07-21 DIAGNOSIS — R51.9 CHRONIC NONINTRACTABLE HEADACHE, UNSPECIFIED HEADACHE TYPE: Primary | ICD-10-CM

## 2019-07-21 DIAGNOSIS — M54.2 CHRONIC NECK PAIN: ICD-10-CM

## 2019-07-21 DIAGNOSIS — G89.29 CHRONIC NONINTRACTABLE HEADACHE, UNSPECIFIED HEADACHE TYPE: Primary | ICD-10-CM

## 2019-07-21 DIAGNOSIS — G89.29 CHRONIC NECK PAIN: ICD-10-CM

## 2019-07-21 LAB
ALBUMIN SERPL-MCNC: 3.9 G/DL (ref 3.5–5.2)
ALBUMIN/GLOB SERPL: 1.4 G/DL
ALP SERPL-CCNC: 54 U/L (ref 39–117)
ALT SERPL W P-5'-P-CCNC: 23 U/L (ref 1–41)
ANION GAP SERPL CALCULATED.3IONS-SCNC: 9.1 MMOL/L (ref 5–15)
AST SERPL-CCNC: 16 U/L (ref 1–40)
BASOPHILS # BLD AUTO: 0.05 10*3/MM3 (ref 0–0.2)
BASOPHILS NFR BLD AUTO: 0.5 % (ref 0–1.5)
BILIRUB SERPL-MCNC: 0.3 MG/DL (ref 0.2–1.2)
BUN BLD-MCNC: 28 MG/DL (ref 8–23)
BUN/CREAT SERPL: 23.9 (ref 7–25)
CALCIUM SPEC-SCNC: 9.5 MG/DL (ref 8.6–10.5)
CHLORIDE SERPL-SCNC: 103 MMOL/L (ref 98–107)
CO2 SERPL-SCNC: 24.9 MMOL/L (ref 22–29)
CREAT BLD-MCNC: 1.17 MG/DL (ref 0.76–1.27)
DEPRECATED RDW RBC AUTO: 41.1 FL (ref 37–54)
EOSINOPHIL # BLD AUTO: 0.12 10*3/MM3 (ref 0–0.4)
EOSINOPHIL NFR BLD AUTO: 1.1 % (ref 0.3–6.2)
ERYTHROCYTE [DISTWIDTH] IN BLOOD BY AUTOMATED COUNT: 11.9 % (ref 12.3–15.4)
GFR SERPL CREATININE-BSD FRML MDRD: 63 ML/MIN/1.73
GLOBULIN UR ELPH-MCNC: 2.7 GM/DL
GLUCOSE BLD-MCNC: 135 MG/DL (ref 65–99)
HCT VFR BLD AUTO: 38.9 % (ref 37.5–51)
HGB BLD-MCNC: 12.9 G/DL (ref 13–17.7)
IMM GRANULOCYTES # BLD AUTO: 0.02 10*3/MM3 (ref 0–0.05)
IMM GRANULOCYTES NFR BLD AUTO: 0.2 % (ref 0–0.5)
LYMPHOCYTES # BLD AUTO: 2.7 10*3/MM3 (ref 0.7–3.1)
LYMPHOCYTES NFR BLD AUTO: 25 % (ref 19.6–45.3)
MCH RBC QN AUTO: 30.9 PG (ref 26.6–33)
MCHC RBC AUTO-ENTMCNC: 33.2 G/DL (ref 31.5–35.7)
MCV RBC AUTO: 93.1 FL (ref 79–97)
MONOCYTES # BLD AUTO: 0.96 10*3/MM3 (ref 0.1–0.9)
MONOCYTES NFR BLD AUTO: 8.9 % (ref 5–12)
NEUTROPHILS # BLD AUTO: 6.96 10*3/MM3 (ref 1.7–7)
NEUTROPHILS NFR BLD AUTO: 64.3 % (ref 42.7–76)
NRBC BLD AUTO-RTO: 0 /100 WBC (ref 0–0.2)
PLATELET # BLD AUTO: 326 10*3/MM3 (ref 140–450)
PMV BLD AUTO: 9.6 FL (ref 6–12)
POTASSIUM BLD-SCNC: 4.6 MMOL/L (ref 3.5–5.2)
PROT SERPL-MCNC: 6.6 G/DL (ref 6–8.5)
RBC # BLD AUTO: 4.18 10*6/MM3 (ref 4.14–5.8)
SODIUM BLD-SCNC: 137 MMOL/L (ref 136–145)
WBC NRBC COR # BLD: 10.81 10*3/MM3 (ref 3.4–10.8)

## 2019-07-21 PROCEDURE — 85025 COMPLETE CBC W/AUTO DIFF WBC: CPT | Performed by: NURSE PRACTITIONER

## 2019-07-21 PROCEDURE — 80053 COMPREHEN METABOLIC PANEL: CPT | Performed by: NURSE PRACTITIONER

## 2019-07-21 PROCEDURE — 70450 CT HEAD/BRAIN W/O DYE: CPT

## 2019-07-21 PROCEDURE — 72125 CT NECK SPINE W/O DYE: CPT

## 2019-07-21 PROCEDURE — 99284 EMERGENCY DEPT VISIT MOD MDM: CPT

## 2019-07-21 RX ORDER — SODIUM CHLORIDE 0.9 % (FLUSH) 0.9 %
10 SYRINGE (ML) INJECTION AS NEEDED
Status: DISCONTINUED | OUTPATIENT
Start: 2019-07-21 | End: 2019-07-21 | Stop reason: HOSPADM

## 2019-07-21 RX ADMIN — SODIUM CHLORIDE 1000 ML: 9 INJECTION, SOLUTION INTRAVENOUS at 14:48

## 2019-08-15 ENCOUNTER — OFFICE VISIT (OUTPATIENT)
Dept: CARDIOLOGY | Facility: CLINIC | Age: 64
End: 2019-08-15

## 2019-08-15 VITALS
WEIGHT: 163 LBS | BODY MASS INDEX: 24.71 KG/M2 | DIASTOLIC BLOOD PRESSURE: 82 MMHG | HEIGHT: 68 IN | SYSTOLIC BLOOD PRESSURE: 150 MMHG | HEART RATE: 58 BPM

## 2019-08-15 DIAGNOSIS — I63.9 CEREBROVASCULAR ACCIDENT (CVA), UNSPECIFIED MECHANISM (HCC): ICD-10-CM

## 2019-08-15 DIAGNOSIS — I10 ESSENTIAL HYPERTENSION: Primary | ICD-10-CM

## 2019-08-15 PROCEDURE — 99214 OFFICE O/P EST MOD 30 MIN: CPT | Performed by: INTERNAL MEDICINE

## 2019-08-15 PROCEDURE — 93000 ELECTROCARDIOGRAM COMPLETE: CPT | Performed by: INTERNAL MEDICINE

## 2019-08-15 RX ORDER — PIOGLITAZONEHYDROCHLORIDE 15 MG/1
15 TABLET ORAL DAILY
COMMUNITY
Start: 2019-07-23 | End: 2020-05-27 | Stop reason: HOSPADM

## 2019-08-15 RX ORDER — GLIPIZIDE 5 MG/1
5 TABLET ORAL DAILY
COMMUNITY
Start: 2019-07-23 | End: 2019-09-03 | Stop reason: HOSPADM

## 2019-08-15 NOTE — PROGRESS NOTES
Subjective:     Encounter Date:08/15/2019      Patient ID: Trever Petit is a 63 y.o. male.    Chief Complaint:  History of Present Illness    This is a 63-year-old with a history of hypertension, diabetes mellitus type 2, hyperlipidemia, vascular dementia, prior strokes, memory disorder, who presents for hospital follow-up.      I saw the patient initially when he was admitted on 6/25/2019 with a hypertensive urgency and atypical chest pain.  Patient had been admitted a few days prior on 6/21/2019 with elevated blood pressures.  Prior to that the patient had reportedly stopped his clonidine due to side effects.  His EKG on presentation showed ST elevation in the anterior leads but he denied any chest pain at that time.  Was felt that his ST elevations were due to hypertension and a repeat EKG showed improvement.  His serial troponins were negative.  He was admitted overnight and placed on oral antihypertensive medications including hydralazine and amlodipine.  An echocardiogram was done during that admission that showed normal left ventricular systolic function and wall motion with an EF of 68%, moderate LVH, and no significant valvular disease.  He was discharged home on metoprolol, amlodipine, and hydralazine.    He presented back on 6/25/2019 with complaints of chest pain lasting for about 2 hours and associated with diaphoresis and shortness of breath.  His EKG showed similar findings of anterior ST elevations.  He was given a nitroglycerin with complete resolution of his pain.  Of note he was also noted to be markedly hypertensive.  He was taken emergently to the cardiac catheterization laboratory for concerns for an anterior myocardial infarction and his coronary angiogram showed mild nonobstructive disease.  In discussion with the patient's brother following his cardiac catheterization it was discovered that the patient was having increasing memory issues and that he may have mixed up his  medications.    The remainder of his hospitalization included titrating his medications to simplify his regimen.  He was also started on aspirin and atorvastatin for his nonobstructive coronary artery disease.  Due to issues with confusion and agitation during his stay and Dr. Mike Alex with neurology was consulted.  He underwent a CT of the head that showed evidence of old multiple strokes which was felt to be due to poorly controlled hypertension.  An EEG showed no epileptiform activity.  The remainder of his dementia work-up was negative.  A carotid ultrasound was unremarkable.  Neuropsychological testing was offered but the patient declined.  He returned a week later and was seen by KATIE Bolden at which time he was doing well with well-controlled blood pressures.    Today presents for routine follow-up.  He continues to feel well.  Denies any chest pain, shortness of breath, PND or orthopnea, near-syncope or syncope, or lower extremity edema.  He has been compliant with all his medications.  He has establish care at [facility] and has been recently started on oral hypoglycemics for diabetes.    Review of Systems   Constitution: Negative for weakness and malaise/fatigue.   HENT: Negative for hearing loss, hoarse voice, nosebleeds and sore throat.    Eyes: Negative for pain.   Cardiovascular: Negative for chest pain, claudication, cyanosis, dyspnea on exertion, irregular heartbeat, leg swelling, near-syncope, orthopnea, palpitations, paroxysmal nocturnal dyspnea and syncope.   Respiratory: Negative for shortness of breath and snoring.    Endocrine: Negative for cold intolerance, heat intolerance, polydipsia, polyphagia and polyuria.   Skin: Negative for itching and rash.   Musculoskeletal: Negative for arthritis, falls, joint pain, joint swelling, muscle cramps, muscle weakness and myalgias.   Gastrointestinal: Negative for constipation, diarrhea, dysphagia, heartburn, hematemesis, hematochezia,  melena, nausea and vomiting.   Genitourinary: Negative for frequency, hematuria and hesitancy.   Neurological: Negative for excessive daytime sleepiness, dizziness, headaches, light-headedness and numbness.   Psychiatric/Behavioral: Negative for depression. The patient is not nervous/anxious.           Current Outpatient Medications:   •  amLODIPine (NORVASC) 10 MG tablet, Take 1 tablet by mouth Daily., Disp: 30 tablet, Rfl: 5  •  aspirin 81 MG EC tablet, Take 81 mg by mouth Daily., Disp: , Rfl:   •  atorvastatin (LIPITOR) 20 MG tablet, Take 1 tablet by mouth Daily., Disp: 30 tablet, Rfl: 5  •  glipiZIDE (GLUCOTROL) 5 MG tablet, Take 5 mg by mouth 2 (Two) Times a Day Before Meals., Disp: , Rfl:   •  hydrALAZINE (APRESOLINE) 50 MG tablet, Take 1 tablet by mouth Every 12 (Twelve) Hours., Disp: 60 tablet, Rfl: 11  •  lisinopril (PRINIVIL,ZESTRIL) 40 MG tablet, Take 1 tablet by mouth Daily., Disp: 30 tablet, Rfl: 5  •  metoprolol succinate XL (TOPROL-XL) 25 MG 24 hr tablet, Take 1 tablet by mouth Daily., Disp: 30 tablet, Rfl: 5  •  pioglitazone (ACTOS) 15 MG tablet, Take 15 mg by mouth Daily., Disp: , Rfl:     Past Medical History:   Diagnosis Date   • Arthritis    • Carpal bone fracture    • Carpal tunnel syndrome    • Cerebrovascular accident (CMS/HCC)    • Constipation    • COPD (chronic obstructive pulmonary disease) (CMS/HCC)    • Coronary artery disease    • Diabetes mellitus (CMS/HCC)    • Elevated cholesterol    • Hypertension    • Myocardial infarction (CMS/HCC)    • Neuropathy    • Spinal stenosis    • Stroke (CMS/HCC)      Past Surgical History:   Procedure Laterality Date   • CARDIAC CATHETERIZATION N/A 6/25/2019    Procedure: Left Heart Cath;  Surgeon: Chen Aguilar MD;  Location: [facility] CATH INVASIVE LOCATION;  Service: Cardiovascular   • CARDIAC CATHETERIZATION N/A 6/25/2019    Procedure: Left ventriculography;  Surgeon: Chen Aguilar MD;  Location:  [facility] CATH INVASIVE LOCATION;  Service:  "Cardiovascular   • CARDIAC CATHETERIZATION N/A 6/25/2019    Procedure: Coronary angiography;  Surgeon: Chen Aguilar MD;  Location: [facility] CATH INVASIVE LOCATION;  Service: Cardiovascular   • CARPAL TUNNEL RELEASE     • CERVICAL FUSION     • NECK SURGERY       No family history on file.  Social History     Tobacco Use   • Smoking status: Never Smoker   Substance Use Topics   • Alcohol use: No     Frequency: Never   • Drug use: Yes     Types: Marijuana           ECG 12 Lead  Date/Time: 8/15/2019 4:46 PM  Performed by: Chen Agiular MD  Authorized by: Chen Aguilar MD   Comparison: compared with previous ECG   Similar to previous ECG  Rhythm: sinus rhythm  Conduction: right bundle branch block and left anterior fascicular block               Objective:         Visit Vitals  /82   Pulse 58   Ht 172.7 cm (68\")   Wt 73.9 kg (163 lb)   BMI 24.78 kg/m²          Physical Exam   Constitutional: He is oriented to person, place, and time. He appears well-developed and well-nourished.   HENT:   Head: Normocephalic and atraumatic.   Neck: No JVD present. Carotid bruit is not present.   Cardiovascular: Normal rate, regular rhythm, S1 normal and S2 normal. Exam reveals no gallop.   No murmur heard.  Pulses:       Radial pulses are 2+ on the right side, and 2+ on the left side.   No bilateral lower extremity edema   Pulmonary/Chest: Effort normal and breath sounds normal.   Abdominal: Soft. Normal appearance.   Neurological: He is alert and oriented to person, place, and time.   Skin: Skin is warm, dry and intact.   Psychiatric: He has a normal mood and affect.       Lab Review:       Assessment:          Diagnosis Plan   1. Essential hypertension     2. Cerebrovascular accident (CVA), unspecified mechanism (CMS/[facility])            Plan:       1.  Hypertension.  Elevated in the office today with his brother reports that his systolics have been running in the 120s at home.  He is been closely followed by the pleural " [facility].  We will continue his current management.  2.  History of prior strokes.  Likely related to uncontrolled hypertension.  3.  Mild nonobstructive coronary artery disease.  Continue aspirin and statin.  4.  Hyperlipidemia.  On statin therapy.    We will plan on seeing the patient back again in 6 months.

## 2019-08-19 ENCOUNTER — HOSPITAL ENCOUNTER (EMERGENCY)
Facility: HOSPITAL | Age: 64
Discharge: HOME | End: 2019-08-19
Attending: EMERGENCY MEDICINE | Admitting: EMERGENCY MEDICINE

## 2019-08-19 ENCOUNTER — APPOINTMENT (OUTPATIENT)
Dept: GENERAL RADIOLOGY | Facility: HOSPITAL | Age: 64
End: 2019-08-19

## 2019-08-19 VITALS
HEART RATE: 54 BPM | SYSTOLIC BLOOD PRESSURE: 151 MMHG | RESPIRATION RATE: 14 BRPM | DIASTOLIC BLOOD PRESSURE: 70 MMHG | HEIGHT: 68 IN | BODY MASS INDEX: 24.51 KG/M2 | WEIGHT: 161.7 LBS | TEMPERATURE: 98.6 F | OXYGEN SATURATION: 100 %

## 2019-08-19 DIAGNOSIS — R07.89 CHEST PAIN, ATYPICAL: Primary | ICD-10-CM

## 2019-08-19 LAB
ALBUMIN SERPL-MCNC: 3.9 G/DL (ref 3.5–5.2)
ALBUMIN/GLOB SERPL: 1.7 G/DL
ALP SERPL-CCNC: 46 U/L (ref 39–117)
ALT SERPL W P-5'-P-CCNC: 13 U/L (ref 1–41)
ANION GAP SERPL CALCULATED.3IONS-SCNC: 12.2 MMOL/L (ref 5–15)
AST SERPL-CCNC: 13 U/L (ref 1–40)
BASOPHILS # BLD AUTO: 0.07 10*3/MM3 (ref 0–0.2)
BASOPHILS NFR BLD AUTO: 0.6 % (ref 0–1.5)
BILIRUB SERPL-MCNC: 0.3 MG/DL (ref 0.2–1.2)
BUN BLD-MCNC: 33 MG/DL (ref 8–23)
BUN/CREAT SERPL: 24.8 (ref 7–25)
CALCIUM SPEC-SCNC: 9 MG/DL (ref 8.6–10.5)
CHLORIDE SERPL-SCNC: 106 MMOL/L (ref 98–107)
CO2 SERPL-SCNC: 22.8 MMOL/L (ref 22–29)
CREAT BLD-MCNC: 1.33 MG/DL (ref 0.76–1.27)
DEPRECATED RDW RBC AUTO: 44.6 FL (ref 37–54)
EOSINOPHIL # BLD AUTO: 0.14 10*3/MM3 (ref 0–0.4)
EOSINOPHIL NFR BLD AUTO: 1.2 % (ref 0.3–6.2)
ERYTHROCYTE [DISTWIDTH] IN BLOOD BY AUTOMATED COUNT: 12.7 % (ref 12.3–15.4)
GFR SERPL CREATININE-BSD FRML MDRD: 54 ML/MIN/1.73
GLOBULIN UR ELPH-MCNC: 2.3 GM/DL
GLUCOSE BLD-MCNC: 136 MG/DL (ref 65–99)
HCT VFR BLD AUTO: 36.7 % (ref 37.5–51)
HGB BLD-MCNC: 11.7 G/DL (ref 13–17.7)
HOLD SPECIMEN: NORMAL
HOLD SPECIMEN: NORMAL
IMM GRANULOCYTES # BLD AUTO: 0.03 10*3/MM3 (ref 0–0.05)
IMM GRANULOCYTES NFR BLD AUTO: 0.2 % (ref 0–0.5)
LYMPHOCYTES # BLD AUTO: 3.38 10*3/MM3 (ref 0.7–3.1)
LYMPHOCYTES NFR BLD AUTO: 27.9 % (ref 19.6–45.3)
MCH RBC QN AUTO: 30.8 PG (ref 26.6–33)
MCHC RBC AUTO-ENTMCNC: 31.9 G/DL (ref 31.5–35.7)
MCV RBC AUTO: 96.6 FL (ref 79–97)
MONOCYTES # BLD AUTO: 1.2 10*3/MM3 (ref 0.1–0.9)
MONOCYTES NFR BLD AUTO: 9.9 % (ref 5–12)
NEUTROPHILS # BLD AUTO: 7.3 10*3/MM3 (ref 1.7–7)
NEUTROPHILS NFR BLD AUTO: 60.2 % (ref 42.7–76)
NRBC BLD AUTO-RTO: 0 /100 WBC (ref 0–0.2)
PLATELET # BLD AUTO: 360 10*3/MM3 (ref 140–450)
PMV BLD AUTO: 9.7 FL (ref 6–12)
POTASSIUM BLD-SCNC: 4.2 MMOL/L (ref 3.5–5.2)
PROT SERPL-MCNC: 6.2 G/DL (ref 6–8.5)
RBC # BLD AUTO: 3.8 10*6/MM3 (ref 4.14–5.8)
SODIUM BLD-SCNC: 141 MMOL/L (ref 136–145)
TROPONIN T SERPL-MCNC: <0.01 NG/ML (ref 0–0.03)
TROPONIN T SERPL-MCNC: <0.01 NG/ML (ref 0–0.03)
WBC NRBC COR # BLD: 12.12 10*3/MM3 (ref 3.4–10.8)
WHOLE BLOOD HOLD SPECIMEN: NORMAL
WHOLE BLOOD HOLD SPECIMEN: NORMAL

## 2019-08-19 PROCEDURE — 84484 ASSAY OF TROPONIN QUANT: CPT | Performed by: EMERGENCY MEDICINE

## 2019-08-19 PROCEDURE — 71046 X-RAY EXAM CHEST 2 VIEWS: CPT

## 2019-08-19 PROCEDURE — 99284 EMERGENCY DEPT VISIT MOD MDM: CPT

## 2019-08-19 PROCEDURE — 80053 COMPREHEN METABOLIC PANEL: CPT

## 2019-08-19 PROCEDURE — 84484 ASSAY OF TROPONIN QUANT: CPT

## 2019-08-19 PROCEDURE — 85025 COMPLETE CBC W/AUTO DIFF WBC: CPT

## 2019-08-19 PROCEDURE — 93005 ELECTROCARDIOGRAM TRACING: CPT | Performed by: EMERGENCY MEDICINE

## 2019-08-19 PROCEDURE — 93010 ELECTROCARDIOGRAM REPORT: CPT | Performed by: INTERNAL MEDICINE

## 2019-08-19 PROCEDURE — 93005 ELECTROCARDIOGRAM TRACING: CPT

## 2019-08-19 RX ORDER — SODIUM CHLORIDE 0.9 % (FLUSH) 0.9 %
10 SYRINGE (ML) INJECTION AS NEEDED
Status: DISCONTINUED | OUTPATIENT
Start: 2019-08-19 | End: 2019-08-19 | Stop reason: HOSPADM

## 2019-08-19 RX ORDER — IBUPROFEN 800 MG/1
800 TABLET ORAL ONCE
Status: COMPLETED | OUTPATIENT
Start: 2019-08-19 | End: 2019-08-19

## 2019-08-19 RX ORDER — ASPIRIN 325 MG
325 TABLET ORAL ONCE
Status: DISCONTINUED | OUTPATIENT
Start: 2019-08-19 | End: 2019-08-19

## 2019-08-19 RX ADMIN — IBUPROFEN 800 MG: 800 TABLET, FILM COATED ORAL at 17:15

## 2019-08-19 NOTE — ED PROVIDER NOTES
EMERGENCY DEPARTMENT ENCOUNTER    Room Number:  30/30  Date seen:  8/19/2019  Time seen: 4:39 PM  PCP: Provider, No Known    HPI:  Chief complaint: CP  Context:Trever Petit is a 63 y.o. male who presents to the ED with c/o generalized CP that is worse with deep breaths.   He was sleeping when the pain started.  Pt states that he is not short of breath and that the CP is not different from previous CP that he has experienced.  He has not done any heavy lifting or strenuous activity recently and does not drink or smoke.        Timing: constant  Duration: not specified  Location: chest  Radiation: none  Quality: generalized CP  Intensity/Severity: moderate  Associated Symptoms: none  Aggravating Factors: deep breaths  Alleviating Factors: none  Previous Episodes: none  Treatment before arrival: none    MEDICAL RECORD REVIEW  CARDIAC CATHETERIZATION REPORT     DATE OF PROCEDURE: 6/25/2019     INDICATION FOR PROCEDURE:  Anterior myocardial infarction     PROCEDURE PERFORMED:   1.  Left heart catheterization  2.  Selective coronary angiography  3.  Left ventriculography     PROCEDURE COMMENTS:      Patient presented initially on 6/21/2019 with complaints of elevated blood pressures.  He was noted to have anterior ST elevations but no significant symptoms.  It was felt that this may be related to his hypertension.  His blood pressures were treated with improvement in his anterior ST elevations.  Patient was admitted overnight and underwent an echocardiogram the following morning that showed normal left ventricular function.  His troponins remained negative.  He was discharged on good blood pressure control.  He returned today with complaints of chest discomfort.  He was noted to be hypertensive in the emergency room with systolic pressures in the 170s.  His EKG showed recurrent anterior ST elevations.  Due to the associated chest pain there was concern that the patient was having an anterior myocardial infarction and a  TMC was initiated.     Upon arrival to the cardiac catheterization laboratory his right wrist is prepped and draped in a sterile manner.  Moderate sedation was used throughout the procedure.  1 mL of 2% lidocaine was infused subcutaneously into the right wrist.  Access to the right radial artery was obtained using a micropuncture needle followed by placement of a 5/6 Georgian slender glide sheath.  Selective coronary intervention then performed using a 6 Georgian XB 3.0 guiding catheter and a 5 Georgian FR4 diagnostic catheter.  A left heart catheterization and left ventricular angiogram performed using a 5 Georgian radial pigtail catheter.     Following completion of the procedure all wires and catheters were removed.  The arterial sheath was removed, manual pressure held, and a pressure dressing placed.  After hemostasis was achieved the patient was transferred to the recovery area in stable condition.     FINDINGS:     CORONARY ANGIOGRAPHY:   1.  Left main: 0% stenosis.  The vessel trifurcates into left anterior descending, ramus intermedius, and left circumflex arteries.  2.  Left anterior descending artery: 0% proximal stenosis.  There is 10% mid stenosis and 0% distal stenosis.  Mid vessel gives rise to a small first diagonal branch with no significant disease.  3.  Ramus intermedius: Moderate caliber vessel with 30% proximal stenosis.  4.  Left circumflex artery: 10% proximal and mid stenosis.  Mid vessel gives rise to a small first and a large second obtuse marginal branches both with no significant disease.  The distal vessel then gives rise to a moderate-sized third obtuse marginal branch with 0% stenosis and then tapers to a small vessel.  5.  Right coronary artery: There is 0% proximal stenosis.  Moderate diffuse calcification with 30 to 40% mid stenosis.  The distal vessel bifurcates into a large posterior descending and posterior lateral branches both with 0% stenosis.  This is a right dominant  system.     LEFT VENTRICULAR HEMODYNAMICS:    1.  Left ventricular pressure: 114/6  2.  Aortic pressure: 117/55     LEFT VENTRICULOGRAPHY:   Hyperdynamic left ventricular systolic function with an EF of greater than 60%.        POST-PROCEDURE DIAGNOSIS:   1. Mild non-obstructive coronary artery disease  2. Hypertension     RECOMMENDATIONS:   Will admit overnight and adjust antihypertensive medications as needed.     ALLERGIES  Lisinopril; Clonidine derivatives; Hydrochlorothiazide; Insulin lispro; Metformin; and Sitagliptin    PAST MEDICAL HISTORY  Active Ambulatory Problems     Diagnosis Date Noted   • Hypertensive emergency 06/21/2019   • Essential hypertension 06/25/2019   • Stroke (CMS/[facility]) 07/02/2019     Resolved Ambulatory Problems     Diagnosis Date Noted   • No Resolved Ambulatory Problems     Past Medical History:   Diagnosis Date   • Arthritis    • Carpal bone fracture    • Carpal tunnel syndrome    • Cerebrovascular accident (CMS/[facility])    • Constipation    • COPD (chronic obstructive pulmonary disease) (CMS/[facility])    • Coronary artery disease    • Diabetes mellitus (CMS/[facility])    • Elevated cholesterol    • Hypertension    • Myocardial infarction (CMS/[facility])    • Neuropathy    • Spinal stenosis    • Stroke (CMS/[facility])        PAST SURGICAL HISTORY  Past Surgical History:   Procedure Laterality Date   • CARDIAC CATHETERIZATION N/A 6/25/2019    Procedure: Left Heart Cath;  Surgeon: Chen Aguilar MD;  Location:  [facility];  Service: Cardiovascular   • CARDIAC CATHETERIZATION N/A 6/25/2019    Procedure: Left ventriculography;  Surgeon: Chen Aguilar MD;  Location:  [facility];  Service: Cardiovascular   • CARDIAC CATHETERIZATION N/A 6/25/2019    Procedure: Coronary angiography;  Surgeon: Chen Aguilar MD;  Location:  [facility];  Service: Cardiovascular   • CARPAL TUNNEL RELEASE     • CERVICAL FUSION     • NECK SURGERY         FAMILY HISTORY  History  reviewed. No pertinent family history.    SOCIAL HISTORY  Social History     Socioeconomic History   • Marital status: Single     Spouse name: Not on file   • Number of children: Not on file   • Years of education: Not on file   • Highest education level: Not on file   Tobacco Use   • Smoking status: Never Smoker   Substance and Sexual Activity   • Alcohol use: No     Frequency: Never   • Drug use: Yes     Types: Marijuana   • Sexual activity: Defer       REVIEW OF SYSTEMS  Review of Systems   Constitutional: Negative for activity change, appetite change, diaphoresis and fever.   HENT: Negative for trouble swallowing.    Eyes: Negative for visual disturbance.   Respiratory: Negative for cough, chest tightness, shortness of breath and wheezing.    Cardiovascular: Positive for chest pain ( generalized, worse with deep breaths). Negative for palpitations and leg swelling.   Gastrointestinal: Negative for abdominal pain, diarrhea, nausea and vomiting.   Genitourinary: Negative for dysuria.   Musculoskeletal: Negative for back pain.   Skin: Negative for rash.   Neurological: Negative for dizziness, speech difficulty and light-headedness.       PHYSICAL EXAM  ED Triage Vitals   Temp Heart Rate Resp BP SpO2   08/19/19 1527 08/19/19 1527 08/19/19 1527 08/19/19 1542 08/19/19 1527   98.5 °F (36.9 °C) 52 16 141/72 98 %      Temp src Heart Rate Source Patient Position BP Location FiO2 (%)   08/19/19 1527 -- -- -- --   Tympanic         Physical Exam   Constitutional: He is oriented to person, place, and time and well-developed, well-nourished, and in no distress. No distress.   HENT:   Head: Normocephalic and atraumatic.   Eyes: Pupils are equal, round, and reactive to light.   Neck: Normal range of motion. Neck supple.   Cardiovascular: Normal rate, regular rhythm, S1 normal, S2 normal and normal heart sounds. Exam reveals no gallop and no friction rub.   No murmur heard.  Pulmonary/Chest: Effort normal and breath sounds  normal. No respiratory distress. He has no decreased breath sounds. He has no wheezes. He has no rales. He exhibits no tenderness.   Multiple deep breaths induced CP.     Abdominal: Soft. There is no rebound and no guarding.   Musculoskeletal: Normal range of motion. He exhibits no deformity.   Lymphadenopathy:     He has no cervical adenopathy.   Neurological: He is alert and oriented to person, place, and time.   Skin: Skin is warm and dry.   Psychiatric: Affect normal.   Nursing note and vitals reviewed.      LAB RESULTS  Recent Results (from the past 24 hour(s))   Comprehensive Metabolic Panel    Collection Time: 08/19/19  3:38 PM   Result Value Ref Range    Glucose 136 (H) 65 - 99 mg/dL    BUN 33 (H) 8 - 23 mg/dL    Creatinine 1.33 (H) 0.76 - 1.27 mg/dL    Sodium 141 136 - 145 mmol/L    Potassium 4.2 3.5 - 5.2 mmol/L    Chloride 106 98 - 107 mmol/L    CO2 22.8 22.0 - 29.0 mmol/L    Calcium 9.0 8.6 - 10.5 mg/dL    Total Protein 6.2 6.0 - 8.5 g/dL    Albumin 3.90 3.50 - 5.20 g/dL    ALT (SGPT) 13 1 - 41 U/L    AST (SGOT) 13 1 - 40 U/L    Alkaline Phosphatase 46 39 - 117 U/L    Total Bilirubin 0.3 0.2 - 1.2 mg/dL    eGFR Non African Amer 54 (L) >60 mL/min/1.73    Globulin 2.3 gm/dL    A/G Ratio 1.7 g/dL    BUN/Creatinine Ratio 24.8 7.0 - 25.0    Anion Gap 12.2 5.0 - 15.0 mmol/L   Troponin    Collection Time: 08/19/19  3:38 PM   Result Value Ref Range    Troponin T <0.010 0.000 - 0.030 ng/mL   Light Blue Top    Collection Time: 08/19/19  3:38 PM   Result Value Ref Range    Extra Tube hold for add-on    Green Top (Gel)    Collection Time: 08/19/19  3:38 PM   Result Value Ref Range    Extra Tube Hold for add-ons.    Lavender Top    Collection Time: 08/19/19  3:38 PM   Result Value Ref Range    Extra Tube hold for add-on    Gold Top - SST    Collection Time: 08/19/19  3:38 PM   Result Value Ref Range    Extra Tube Hold for add-ons.    CBC Auto Differential    Collection Time: 08/19/19  3:38 PM   Result Value Ref Range     WBC 12.12 (H) 3.40 - 10.80 10*3/mm3    RBC 3.80 (L) 4.14 - 5.80 10*6/mm3    Hemoglobin 11.7 (L) 13.0 - 17.7 g/dL    Hematocrit 36.7 (L) 37.5 - 51.0 %    MCV 96.6 79.0 - 97.0 fL    MCH 30.8 26.6 - 33.0 pg    MCHC 31.9 31.5 - 35.7 g/dL    RDW 12.7 12.3 - 15.4 %    RDW-SD 44.6 37.0 - 54.0 fl    MPV 9.7 6.0 - 12.0 fL    Platelets 360 140 - 450 10*3/mm3    Neutrophil % 60.2 42.7 - 76.0 %    Lymphocyte % 27.9 19.6 - 45.3 %    Monocyte % 9.9 5.0 - 12.0 %    Eosinophil % 1.2 0.3 - 6.2 %    Basophil % 0.6 0.0 - 1.5 %    Immature Grans % 0.2 0.0 - 0.5 %    Neutrophils, Absolute 7.30 (H) 1.70 - 7.00 10*3/mm3    Lymphocytes, Absolute 3.38 (H) 0.70 - 3.10 10*3/mm3    Monocytes, Absolute 1.20 (H) 0.10 - 0.90 10*3/mm3    Eosinophils, Absolute 0.14 0.00 - 0.40 10*3/mm3    Basophils, Absolute 0.07 0.00 - 0.20 10*3/mm3    Immature Grans, Absolute 0.03 0.00 - 0.05 10*3/mm3    nRBC 0.0 0.0 - 0.2 /100 WBC   Troponin    Collection Time: 08/19/19  5:27 PM   Result Value Ref Range    Troponin T <0.010 0.000 - 0.030 ng/mL       I ordered the above labs and reviewed the results    RADIOLOGY  XR Chest 2 View   TWO-VIEW CHEST     HISTORY: Chest pain.     FINDINGS: The lungs are well-expanded and clear except for a calcified  granuloma in the right upper lobe. The heart is borderline enlarged and  there is no acute disease or change from 06/21/2019.     This report was finalized on 8/19/2019 5:20 PM by Dr. Perry Orozco M.D.          I ordered the above noted radiological studies and reviewed the images on the PACS system.      MEDICATIONS GIVEN IN ER  Medications   sodium chloride 0.9 % flush 10 mL (not administered)   ibuprofen (ADVIL,MOTRIN) tablet 800 mg (800 mg Oral Given 8/19/19 1715)       EKG  Interpreted by ED Physician    PROCEDURES  Procedures    COURSE & MEDICAL DECISION MAKING  Pt on ASA, recent heart cath which excludes major blockages, no tachycardia, no hypoxia, not likely PE.     Pertinent Labs and Imaging studies that  "were ordered and reviewed are noted above.  Results were reviewed/discussed with the patient and they were also made aware of online access.  Pt also made aware that some labs, such as cultures, will not be resulted during ER visit and follow up with PMD is necessary.     PROGRESS AND CONSULTS    Progress Notes:            1749: Reviewed pt's history and workup with Dr. Krzysztof MD.  After a bedside evaluation, Dr. Blankenship agrees with the plan of care.    1900: Pt states pain in unchanged.  The patient's history, physical exam, and lab findings were discussed with the physician, who also performed a face to face history and physical exam.  I discussed all results and noted any abnormalities with patient.  Discussed absoute need to recheck abnormalities with their family physician.  I answered any of the patient's questions.  Discussed plan for discharge, as there is no emergent indication for admission.  Pt is agreeable and understands need for follow up and repeat testing.  Pt is aware that discharge does not mean that nothing is wrong but it indicates no emergency is present and they must continue care with their family physician.  Pt is discharged with instructions to follow up with primary care doctor to have their blood pressure rechecked.       Disposition vitals:  /70 (BP Location: Right arm, Patient Position: Lying)   Pulse 54   Temp 98.6 °F (37 °C) (Oral)   Resp 14   Ht 172.7 cm (68\")   Wt 73.3 kg (161 lb 11.2 oz)   SpO2 100%   BMI 24.59 kg/m²       DIAGNOSIS  Final diagnoses:   Chest pain, atypical       FOLLOW UP   Chen Aguilar MD  [address] [id number] [phone]    Schedule an appointment as soon as possible for a visit   As needed      RX     Medication List      No changes were made to your prescriptions during this visit.         Documentation assistance provided by pastor Littlejohn for JOCELINE Kuo.  Information recorded by the pastor was done at " my direction and has been verified and validated by me.           Flo Littlejohn  08/19/19 1953       Tiffany Sommer APRN  08/19/19 2051

## 2019-08-19 NOTE — ED PROVIDER NOTES
Pt presents to the ED c/o midsternal cp that is worse with deep breathing that began earlier this morning. Pt denies SOA, fever, chills, or cp currently. Pt has a cardiac cath in June 2019 which was unremarkable.  Family at bedside.          On exam,   Lungs are CTAB  No chest wall tenderness  Cardio:bradycardic, regular rhythm           EKG          EKG time: 1531  Rhythm/Rate: sinus bradycardia 46 BPM  P waves and UT: normal  QRS, axis: RBBB, LAFB   ST and T waves: no acute ischemic changes     Interpreted Contemporaneously by me, independently viewed  Improved compared to prior 6/28/19       Plan: Review lab work and CXR     Attestation:  The STEPHANIE and I have discussed this patient's history, physical exam, and treatment plan.  I have reviewed the documentation and personally had a face to face interaction with the patient. I affirm the documentation and agree with the treatment and plan.  The attached note describes my personal findings.       Documentation assistance provided by pastor Swartz for MD Kelsey.  Information recorded by the scribe was done at my direction and has been verified and validated by me.              Emma Swartz  08/19/19 1800       Bebeto Blankenship MD  08/20/19 0121

## 2019-08-25 ENCOUNTER — HOSPITAL ENCOUNTER (INPATIENT)
Facility: HOSPITAL | Age: 64
LOS: 9 days | Discharge: NURSING, REHAB OR LONG-TERM CARE | End: 2019-09-03
Attending: EMERGENCY MEDICINE | Admitting: INTERNAL MEDICINE

## 2019-08-25 ENCOUNTER — APPOINTMENT (OUTPATIENT)
Dept: GENERAL RADIOLOGY | Facility: HOSPITAL | Age: 64
End: 2019-08-25

## 2019-08-25 DIAGNOSIS — J90 PLEURAL EFFUSION: ICD-10-CM

## 2019-08-25 DIAGNOSIS — D72.829 LEUKOCYTOSIS, UNSPECIFIED TYPE: ICD-10-CM

## 2019-08-25 DIAGNOSIS — S22.42XA CLOSED FRACTURE OF MULTIPLE RIBS OF LEFT SIDE, INITIAL ENCOUNTER: ICD-10-CM

## 2019-08-25 DIAGNOSIS — E16.2 HYPOGLYCEMIA: Primary | ICD-10-CM

## 2019-08-25 PROBLEM — F01.50 VASCULAR DEMENTIA (HCC): Status: ACTIVE | Noted: 2019-08-25

## 2019-08-25 PROBLEM — E11.9 TYPE 2 DIABETES MELLITUS, WITHOUT LONG-TERM CURRENT USE OF INSULIN (HCC): Status: ACTIVE | Noted: 2019-08-25

## 2019-08-25 LAB
ALBUMIN SERPL-MCNC: 4.6 G/DL (ref 3.5–5.2)
ALBUMIN/GLOB SERPL: 1.9 G/DL
ALP SERPL-CCNC: 51 U/L (ref 39–117)
ALT SERPL W P-5'-P-CCNC: 27 U/L (ref 1–41)
ANION GAP SERPL CALCULATED.3IONS-SCNC: 11.7 MMOL/L (ref 5–15)
AST SERPL-CCNC: 65 U/L (ref 1–40)
BACTERIA UR QL AUTO: ABNORMAL /HPF
BASOPHILS # BLD AUTO: 0.02 10*3/MM3 (ref 0–0.2)
BASOPHILS NFR BLD AUTO: 0.1 % (ref 0–1.5)
BILIRUB SERPL-MCNC: 0.5 MG/DL (ref 0.2–1.2)
BILIRUB UR QL STRIP: NEGATIVE
BUN BLD-MCNC: 29 MG/DL (ref 8–23)
BUN/CREAT SERPL: 24.2 (ref 7–25)
CALCIUM SPEC-SCNC: 9.7 MG/DL (ref 8.6–10.5)
CHLORIDE SERPL-SCNC: 105 MMOL/L (ref 98–107)
CLARITY UR: CLEAR
CO2 SERPL-SCNC: 27.3 MMOL/L (ref 22–29)
COLOR UR: YELLOW
CREAT BLD-MCNC: 1.2 MG/DL (ref 0.76–1.27)
DEPRECATED RDW RBC AUTO: 48.4 FL (ref 37–54)
EOSINOPHIL # BLD AUTO: 0 10*3/MM3 (ref 0–0.4)
EOSINOPHIL NFR BLD AUTO: 0 % (ref 0.3–6.2)
ERYTHROCYTE [DISTWIDTH] IN BLOOD BY AUTOMATED COUNT: 13.5 % (ref 12.3–15.4)
GFR SERPL CREATININE-BSD FRML MDRD: 61 ML/MIN/1.73
GLOBULIN UR ELPH-MCNC: 2.4 GM/DL
GLUCOSE BLD-MCNC: 35 MG/DL (ref 65–99)
GLUCOSE BLDC GLUCOMTR-MCNC: 114 MG/DL (ref 70–130)
GLUCOSE BLDC GLUCOMTR-MCNC: 116 MG/DL (ref 70–130)
GLUCOSE BLDC GLUCOMTR-MCNC: 117 MG/DL (ref 70–130)
GLUCOSE BLDC GLUCOMTR-MCNC: 137 MG/DL (ref 70–130)
GLUCOSE BLDC GLUCOMTR-MCNC: 148 MG/DL (ref 70–130)
GLUCOSE BLDC GLUCOMTR-MCNC: 31 MG/DL (ref 70–130)
GLUCOSE BLDC GLUCOMTR-MCNC: 36 MG/DL (ref 70–130)
GLUCOSE BLDC GLUCOMTR-MCNC: 85 MG/DL (ref 70–130)
GLUCOSE UR STRIP-MCNC: NEGATIVE MG/DL
HCT VFR BLD AUTO: 35.5 % (ref 37.5–51)
HGB BLD-MCNC: 11.3 G/DL (ref 13–17.7)
HGB UR QL STRIP.AUTO: NEGATIVE
HYALINE CASTS UR QL AUTO: ABNORMAL /LPF
IMM GRANULOCYTES # BLD AUTO: 0.11 10*3/MM3 (ref 0–0.05)
IMM GRANULOCYTES NFR BLD AUTO: 0.5 % (ref 0–0.5)
KETONES UR QL STRIP: ABNORMAL
LEUKOCYTE ESTERASE UR QL STRIP.AUTO: NEGATIVE
LIPASE SERPL-CCNC: 15 U/L (ref 13–60)
LYMPHOCYTES # BLD AUTO: 1.3 10*3/MM3 (ref 0.7–3.1)
LYMPHOCYTES NFR BLD AUTO: 6.3 % (ref 19.6–45.3)
MCH RBC QN AUTO: 31 PG (ref 26.6–33)
MCHC RBC AUTO-ENTMCNC: 31.8 G/DL (ref 31.5–35.7)
MCV RBC AUTO: 97.5 FL (ref 79–97)
MONOCYTES # BLD AUTO: 2.25 10*3/MM3 (ref 0.1–0.9)
MONOCYTES NFR BLD AUTO: 11 % (ref 5–12)
NEUTROPHILS # BLD AUTO: 16.86 10*3/MM3 (ref 1.7–7)
NEUTROPHILS NFR BLD AUTO: 82.1 % (ref 42.7–76)
NITRITE UR QL STRIP: NEGATIVE
NRBC BLD AUTO-RTO: 0 /100 WBC (ref 0–0.2)
PH UR STRIP.AUTO: <=5 [PH] (ref 5–8)
PLATELET # BLD AUTO: 349 10*3/MM3 (ref 140–450)
PMV BLD AUTO: 9.4 FL (ref 6–12)
POTASSIUM BLD-SCNC: 4.3 MMOL/L (ref 3.5–5.2)
PROT SERPL-MCNC: 7 G/DL (ref 6–8.5)
PROT UR QL STRIP: ABNORMAL
RBC # BLD AUTO: 3.64 10*6/MM3 (ref 4.14–5.8)
RBC # UR: ABNORMAL /HPF
REF LAB TEST METHOD: ABNORMAL
SODIUM BLD-SCNC: 144 MMOL/L (ref 136–145)
SP GR UR STRIP: 1.02 (ref 1–1.03)
SQUAMOUS #/AREA URNS HPF: ABNORMAL /HPF
UROBILINOGEN UR QL STRIP: ABNORMAL
WBC NRBC COR # BLD: 20.54 10*3/MM3 (ref 3.4–10.8)
WBC UR QL AUTO: ABNORMAL /HPF

## 2019-08-25 PROCEDURE — 25010000002 KETOROLAC TROMETHAMINE PER 15 MG: Performed by: EMERGENCY MEDICINE

## 2019-08-25 PROCEDURE — 25010000002 HYDROMORPHONE PER 4 MG: Performed by: EMERGENCY MEDICINE

## 2019-08-25 PROCEDURE — 81001 URINALYSIS AUTO W/SCOPE: CPT | Performed by: EMERGENCY MEDICINE

## 2019-08-25 PROCEDURE — 80053 COMPREHEN METABOLIC PANEL: CPT | Performed by: EMERGENCY MEDICINE

## 2019-08-25 PROCEDURE — 85025 COMPLETE CBC W/AUTO DIFF WBC: CPT | Performed by: EMERGENCY MEDICINE

## 2019-08-25 PROCEDURE — 99285 EMERGENCY DEPT VISIT HI MDM: CPT

## 2019-08-25 PROCEDURE — 93005 ELECTROCARDIOGRAM TRACING: CPT | Performed by: EMERGENCY MEDICINE

## 2019-08-25 PROCEDURE — 82962 GLUCOSE BLOOD TEST: CPT

## 2019-08-25 PROCEDURE — 25010000002 ENOXAPARIN PER 10 MG: Performed by: NURSE PRACTITIONER

## 2019-08-25 PROCEDURE — G0378 HOSPITAL OBSERVATION PER HR: HCPCS

## 2019-08-25 PROCEDURE — 71101 X-RAY EXAM UNILAT RIBS/CHEST: CPT

## 2019-08-25 PROCEDURE — 93010 ELECTROCARDIOGRAM REPORT: CPT | Performed by: INTERNAL MEDICINE

## 2019-08-25 PROCEDURE — 25010000002 ONDANSETRON PER 1 MG: Performed by: EMERGENCY MEDICINE

## 2019-08-25 PROCEDURE — 25010000002 ONDANSETRON PER 1 MG: Performed by: NURSE PRACTITIONER

## 2019-08-25 PROCEDURE — 83690 ASSAY OF LIPASE: CPT | Performed by: EMERGENCY MEDICINE

## 2019-08-25 RX ORDER — DEXTROSE MONOHYDRATE 25 G/50ML
INJECTION, SOLUTION INTRAVENOUS
Status: COMPLETED
Start: 2019-08-25 | End: 2019-08-25

## 2019-08-25 RX ORDER — ACETAMINOPHEN 160 MG/5ML
650 SOLUTION ORAL EVERY 4 HOURS PRN
Status: DISCONTINUED | OUTPATIENT
Start: 2019-08-25 | End: 2019-09-03 | Stop reason: HOSPADM

## 2019-08-25 RX ORDER — HYDROMORPHONE HYDROCHLORIDE 1 MG/ML
0.5 INJECTION, SOLUTION INTRAMUSCULAR; INTRAVENOUS; SUBCUTANEOUS
Status: DISCONTINUED | OUTPATIENT
Start: 2019-08-25 | End: 2019-08-25

## 2019-08-25 RX ORDER — SODIUM CHLORIDE 9 MG/ML
100 INJECTION, SOLUTION INTRAVENOUS CONTINUOUS
Status: DISCONTINUED | OUTPATIENT
Start: 2019-08-25 | End: 2019-08-25

## 2019-08-25 RX ORDER — SODIUM CHLORIDE 0.9 % (FLUSH) 0.9 %
3 SYRINGE (ML) INJECTION EVERY 12 HOURS SCHEDULED
Status: DISCONTINUED | OUTPATIENT
Start: 2019-08-25 | End: 2019-09-03 | Stop reason: HOSPADM

## 2019-08-25 RX ORDER — ATORVASTATIN CALCIUM 20 MG/1
20 TABLET, FILM COATED ORAL DAILY
Status: DISCONTINUED | OUTPATIENT
Start: 2019-08-25 | End: 2019-09-03 | Stop reason: HOSPADM

## 2019-08-25 RX ORDER — AMLODIPINE BESYLATE 10 MG/1
10 TABLET ORAL
Status: DISCONTINUED | OUTPATIENT
Start: 2019-08-25 | End: 2019-09-03 | Stop reason: HOSPADM

## 2019-08-25 RX ORDER — ONDANSETRON 2 MG/ML
4 INJECTION INTRAMUSCULAR; INTRAVENOUS EVERY 6 HOURS PRN
Status: DISCONTINUED | OUTPATIENT
Start: 2019-08-25 | End: 2019-09-03 | Stop reason: HOSPADM

## 2019-08-25 RX ORDER — SODIUM CHLORIDE 0.9 % (FLUSH) 0.9 %
10 SYRINGE (ML) INJECTION AS NEEDED
Status: DISCONTINUED | OUTPATIENT
Start: 2019-08-25 | End: 2019-09-03 | Stop reason: HOSPADM

## 2019-08-25 RX ORDER — KETOROLAC TROMETHAMINE 15 MG/ML
15 INJECTION, SOLUTION INTRAMUSCULAR; INTRAVENOUS ONCE
Status: COMPLETED | OUTPATIENT
Start: 2019-08-25 | End: 2019-08-25

## 2019-08-25 RX ORDER — DEXTROSE MONOHYDRATE 25 G/50ML
25 INJECTION, SOLUTION INTRAVENOUS ONCE
Status: COMPLETED | OUTPATIENT
Start: 2019-08-25 | End: 2019-08-25

## 2019-08-25 RX ORDER — DEXTROSE MONOHYDRATE 25 G/50ML
25 INJECTION, SOLUTION INTRAVENOUS
Status: DISCONTINUED | OUTPATIENT
Start: 2019-08-25 | End: 2019-09-03 | Stop reason: HOSPADM

## 2019-08-25 RX ORDER — METOPROLOL SUCCINATE 25 MG/1
25 TABLET, EXTENDED RELEASE ORAL
Status: DISCONTINUED | OUTPATIENT
Start: 2019-08-25 | End: 2019-09-03 | Stop reason: HOSPADM

## 2019-08-25 RX ORDER — HYDRALAZINE HYDROCHLORIDE 50 MG/1
50 TABLET, FILM COATED ORAL EVERY 12 HOURS SCHEDULED
Status: DISCONTINUED | OUTPATIENT
Start: 2019-08-25 | End: 2019-09-03 | Stop reason: HOSPADM

## 2019-08-25 RX ORDER — NITROGLYCERIN 0.4 MG/1
0.4 TABLET SUBLINGUAL
Status: DISCONTINUED | OUTPATIENT
Start: 2019-08-25 | End: 2019-09-03 | Stop reason: HOSPADM

## 2019-08-25 RX ORDER — ASPIRIN 81 MG/1
81 TABLET ORAL DAILY
Status: DISCONTINUED | OUTPATIENT
Start: 2019-08-25 | End: 2019-09-03 | Stop reason: HOSPADM

## 2019-08-25 RX ORDER — ACETAMINOPHEN 325 MG/1
650 TABLET ORAL EVERY 4 HOURS PRN
Status: DISCONTINUED | OUTPATIENT
Start: 2019-08-25 | End: 2019-09-03 | Stop reason: HOSPADM

## 2019-08-25 RX ORDER — ONDANSETRON 2 MG/ML
4 INJECTION INTRAMUSCULAR; INTRAVENOUS ONCE
Status: COMPLETED | OUTPATIENT
Start: 2019-08-25 | End: 2019-08-25

## 2019-08-25 RX ORDER — NICOTINE POLACRILEX 4 MG
15 LOZENGE BUCCAL
Status: DISCONTINUED | OUTPATIENT
Start: 2019-08-25 | End: 2019-09-03 | Stop reason: HOSPADM

## 2019-08-25 RX ORDER — SODIUM CHLORIDE 0.9 % (FLUSH) 0.9 %
3-10 SYRINGE (ML) INJECTION AS NEEDED
Status: DISCONTINUED | OUTPATIENT
Start: 2019-08-25 | End: 2019-09-03 | Stop reason: HOSPADM

## 2019-08-25 RX ORDER — HYDROMORPHONE HYDROCHLORIDE 1 MG/ML
0.5 INJECTION, SOLUTION INTRAMUSCULAR; INTRAVENOUS; SUBCUTANEOUS ONCE
Status: COMPLETED | OUTPATIENT
Start: 2019-08-25 | End: 2019-08-25

## 2019-08-25 RX ORDER — LISINOPRIL 40 MG/1
40 TABLET ORAL
Status: DISCONTINUED | OUTPATIENT
Start: 2019-08-25 | End: 2019-09-03 | Stop reason: HOSPADM

## 2019-08-25 RX ORDER — ACETAMINOPHEN 650 MG/1
650 SUPPOSITORY RECTAL EVERY 4 HOURS PRN
Status: DISCONTINUED | OUTPATIENT
Start: 2019-08-25 | End: 2019-09-03 | Stop reason: HOSPADM

## 2019-08-25 RX ORDER — DEXTROSE AND SODIUM CHLORIDE 5; .45 G/100ML; G/100ML
75 INJECTION, SOLUTION INTRAVENOUS CONTINUOUS
Status: DISCONTINUED | OUTPATIENT
Start: 2019-08-25 | End: 2019-08-27

## 2019-08-25 RX ORDER — LIDOCAINE 50 MG/G
2 PATCH TOPICAL
Status: DISCONTINUED | OUTPATIENT
Start: 2019-08-25 | End: 2019-09-03 | Stop reason: HOSPADM

## 2019-08-25 RX ADMIN — KETOROLAC TROMETHAMINE 15 MG: 15 INJECTION, SOLUTION INTRAMUSCULAR; INTRAVENOUS at 11:16

## 2019-08-25 RX ADMIN — ASPIRIN 81 MG: 81 TABLET, COATED ORAL at 20:05

## 2019-08-25 RX ADMIN — ENOXAPARIN SODIUM 40 MG: 40 INJECTION SUBCUTANEOUS at 18:14

## 2019-08-25 RX ADMIN — DEXTROSE MONOHYDRATE 25 G: 25 INJECTION, SOLUTION INTRAVENOUS at 14:22

## 2019-08-25 RX ADMIN — AMLODIPINE BESYLATE 10 MG: 10 TABLET ORAL at 20:59

## 2019-08-25 RX ADMIN — DEXTROSE AND SODIUM CHLORIDE 125 ML/HR: 5; 450 INJECTION, SOLUTION INTRAVENOUS at 22:46

## 2019-08-25 RX ADMIN — ONDANSETRON 4 MG: 2 INJECTION INTRAMUSCULAR; INTRAVENOUS at 18:14

## 2019-08-25 RX ADMIN — ATORVASTATIN CALCIUM 20 MG: 20 TABLET, FILM COATED ORAL at 20:06

## 2019-08-25 RX ADMIN — METOPROLOL SUCCINATE 25 MG: 25 TABLET, FILM COATED, EXTENDED RELEASE ORAL at 20:05

## 2019-08-25 RX ADMIN — ONDANSETRON 4 MG: 2 INJECTION INTRAMUSCULAR; INTRAVENOUS at 11:17

## 2019-08-25 RX ADMIN — DEXTROSE MONOHYDRATE 25 G: 25 INJECTION, SOLUTION INTRAVENOUS at 09:06

## 2019-08-25 RX ADMIN — DEXTROSE AND SODIUM CHLORIDE 125 ML/HR: 5; 450 INJECTION, SOLUTION INTRAVENOUS at 10:35

## 2019-08-25 RX ADMIN — HYDROMORPHONE HYDROCHLORIDE 0.5 MG: 1 INJECTION, SOLUTION INTRAMUSCULAR; INTRAVENOUS; SUBCUTANEOUS at 11:16

## 2019-08-25 RX ADMIN — HYDRALAZINE HYDROCHLORIDE 50 MG: 50 TABLET, FILM COATED ORAL at 20:05

## 2019-08-25 RX ADMIN — LISINOPRIL 40 MG: 40 TABLET ORAL at 20:59

## 2019-08-25 RX ADMIN — SODIUM CHLORIDE, PRESERVATIVE FREE 3 ML: 5 INJECTION INTRAVENOUS at 20:59

## 2019-08-26 LAB
DEPRECATED RDW RBC AUTO: 49.1 FL (ref 37–54)
ERYTHROCYTE [DISTWIDTH] IN BLOOD BY AUTOMATED COUNT: 13.4 % (ref 12.3–15.4)
GLUCOSE BLDC GLUCOMTR-MCNC: 143 MG/DL (ref 70–130)
GLUCOSE BLDC GLUCOMTR-MCNC: 270 MG/DL (ref 70–130)
GLUCOSE BLDC GLUCOMTR-MCNC: 314 MG/DL (ref 70–130)
GLUCOSE BLDC GLUCOMTR-MCNC: 323 MG/DL (ref 70–130)
HCT VFR BLD AUTO: 34 % (ref 37.5–51)
HGB BLD-MCNC: 10.6 G/DL (ref 13–17.7)
MCH RBC QN AUTO: 30.6 PG (ref 26.6–33)
MCHC RBC AUTO-ENTMCNC: 31.2 G/DL (ref 31.5–35.7)
MCV RBC AUTO: 98.3 FL (ref 79–97)
PLATELET # BLD AUTO: 312 10*3/MM3 (ref 140–450)
PMV BLD AUTO: 10 FL (ref 6–12)
RBC # BLD AUTO: 3.46 10*6/MM3 (ref 4.14–5.8)
WBC NRBC COR # BLD: 16.84 10*3/MM3 (ref 3.4–10.8)

## 2019-08-26 PROCEDURE — 92610 EVALUATE SWALLOWING FUNCTION: CPT | Performed by: SPEECH-LANGUAGE PATHOLOGIST

## 2019-08-26 PROCEDURE — G0378 HOSPITAL OBSERVATION PER HR: HCPCS

## 2019-08-26 PROCEDURE — 85027 COMPLETE CBC AUTOMATED: CPT | Performed by: NURSE PRACTITIONER

## 2019-08-26 PROCEDURE — 82962 GLUCOSE BLOOD TEST: CPT

## 2019-08-26 PROCEDURE — 97162 PT EVAL MOD COMPLEX 30 MIN: CPT | Performed by: PHYSICAL THERAPIST

## 2019-08-26 PROCEDURE — 25010000002 ENOXAPARIN PER 10 MG: Performed by: NURSE PRACTITIONER

## 2019-08-26 PROCEDURE — 63710000001 INSULIN LISPRO (HUMAN) PER 5 UNITS: Performed by: NURSE PRACTITIONER

## 2019-08-26 PROCEDURE — 97110 THERAPEUTIC EXERCISES: CPT | Performed by: PHYSICAL THERAPIST

## 2019-08-26 RX ADMIN — ACETAMINOPHEN ORAL SOLUTION 650 MG: 325 SOLUTION ORAL at 17:30

## 2019-08-26 RX ADMIN — HYDRALAZINE HYDROCHLORIDE 50 MG: 50 TABLET, FILM COATED ORAL at 10:37

## 2019-08-26 RX ADMIN — DEXTROSE AND SODIUM CHLORIDE 75 ML/HR: 5; 450 INJECTION, SOLUTION INTRAVENOUS at 18:22

## 2019-08-26 RX ADMIN — HYDRALAZINE HYDROCHLORIDE 50 MG: 50 TABLET, FILM COATED ORAL at 20:50

## 2019-08-26 RX ADMIN — SODIUM CHLORIDE, PRESERVATIVE FREE 3 ML: 5 INJECTION INTRAVENOUS at 10:38

## 2019-08-26 RX ADMIN — ATORVASTATIN CALCIUM 20 MG: 20 TABLET, FILM COATED ORAL at 08:19

## 2019-08-26 RX ADMIN — ENOXAPARIN SODIUM 40 MG: 40 INJECTION SUBCUTANEOUS at 17:30

## 2019-08-26 RX ADMIN — SODIUM CHLORIDE, PRESERVATIVE FREE 3 ML: 5 INJECTION INTRAVENOUS at 20:50

## 2019-08-26 RX ADMIN — DEXTROSE AND SODIUM CHLORIDE 125 ML/HR: 5; 450 INJECTION, SOLUTION INTRAVENOUS at 08:13

## 2019-08-26 RX ADMIN — ASPIRIN 81 MG: 81 TABLET, COATED ORAL at 08:19

## 2019-08-26 RX ADMIN — LIDOCAINE 2 PATCH: 50 PATCH TOPICAL at 08:18

## 2019-08-26 RX ADMIN — AMLODIPINE BESYLATE 10 MG: 10 TABLET ORAL at 08:18

## 2019-08-26 RX ADMIN — INSULIN LISPRO 5 UNITS: 100 INJECTION, SOLUTION INTRAVENOUS; SUBCUTANEOUS at 17:33

## 2019-08-26 RX ADMIN — INSULIN LISPRO 4 UNITS: 100 INJECTION, SOLUTION INTRAVENOUS; SUBCUTANEOUS at 21:07

## 2019-08-26 RX ADMIN — LISINOPRIL 40 MG: 40 TABLET ORAL at 08:19

## 2019-08-27 LAB
DEPRECATED RDW RBC AUTO: 46.5 FL (ref 37–54)
ERYTHROCYTE [DISTWIDTH] IN BLOOD BY AUTOMATED COUNT: 12.9 % (ref 12.3–15.4)
GLUCOSE BLDC GLUCOMTR-MCNC: 138 MG/DL (ref 70–130)
GLUCOSE BLDC GLUCOMTR-MCNC: 201 MG/DL (ref 70–130)
GLUCOSE BLDC GLUCOMTR-MCNC: 221 MG/DL (ref 70–130)
GLUCOSE BLDC GLUCOMTR-MCNC: 288 MG/DL (ref 70–130)
HCT VFR BLD AUTO: 35.1 % (ref 37.5–51)
HGB BLD-MCNC: 11 G/DL (ref 13–17.7)
MCH RBC QN AUTO: 30.8 PG (ref 26.6–33)
MCHC RBC AUTO-ENTMCNC: 31.3 G/DL (ref 31.5–35.7)
MCV RBC AUTO: 98.3 FL (ref 79–97)
PLATELET # BLD AUTO: 298 10*3/MM3 (ref 140–450)
PMV BLD AUTO: 10.5 FL (ref 6–12)
RBC # BLD AUTO: 3.57 10*6/MM3 (ref 4.14–5.8)
WBC NRBC COR # BLD: 17.84 10*3/MM3 (ref 3.4–10.8)

## 2019-08-27 PROCEDURE — 85027 COMPLETE CBC AUTOMATED: CPT | Performed by: HOSPITALIST

## 2019-08-27 PROCEDURE — 25010000002 ENOXAPARIN PER 10 MG: Performed by: NURSE PRACTITIONER

## 2019-08-27 PROCEDURE — 63710000001 INSULIN LISPRO (HUMAN) PER 5 UNITS: Performed by: NURSE PRACTITIONER

## 2019-08-27 PROCEDURE — G0378 HOSPITAL OBSERVATION PER HR: HCPCS

## 2019-08-27 PROCEDURE — 82962 GLUCOSE BLOOD TEST: CPT

## 2019-08-27 PROCEDURE — 97110 THERAPEUTIC EXERCISES: CPT | Performed by: PHYSICAL THERAPIST

## 2019-08-27 RX ADMIN — INSULIN LISPRO 3 UNITS: 100 INJECTION, SOLUTION INTRAVENOUS; SUBCUTANEOUS at 09:41

## 2019-08-27 RX ADMIN — ACETAMINOPHEN ORAL SOLUTION 650 MG: 325 SOLUTION ORAL at 20:22

## 2019-08-27 RX ADMIN — ASPIRIN 81 MG: 81 TABLET, COATED ORAL at 09:42

## 2019-08-27 RX ADMIN — INSULIN LISPRO 4 UNITS: 100 INJECTION, SOLUTION INTRAVENOUS; SUBCUTANEOUS at 12:24

## 2019-08-27 RX ADMIN — HYDRALAZINE HYDROCHLORIDE 50 MG: 50 TABLET, FILM COATED ORAL at 09:41

## 2019-08-27 RX ADMIN — ATORVASTATIN CALCIUM 20 MG: 20 TABLET, FILM COATED ORAL at 09:41

## 2019-08-27 RX ADMIN — DEXTROSE AND SODIUM CHLORIDE 75 ML/HR: 5; 450 INJECTION, SOLUTION INTRAVENOUS at 09:47

## 2019-08-27 RX ADMIN — ENOXAPARIN SODIUM 40 MG: 40 INJECTION SUBCUTANEOUS at 17:32

## 2019-08-27 RX ADMIN — ACETAMINOPHEN ORAL SOLUTION 650 MG: 325 SOLUTION ORAL at 00:14

## 2019-08-27 RX ADMIN — POLYETHYLENE GLYCOL 3350 17 G: 17 POWDER, FOR SOLUTION ORAL at 14:25

## 2019-08-27 RX ADMIN — ACETAMINOPHEN 650 MG: 325 TABLET, FILM COATED ORAL at 14:27

## 2019-08-27 RX ADMIN — INSULIN LISPRO 3 UNITS: 100 INJECTION, SOLUTION INTRAVENOUS; SUBCUTANEOUS at 21:33

## 2019-08-27 RX ADMIN — HYDRALAZINE HYDROCHLORIDE 50 MG: 50 TABLET, FILM COATED ORAL at 20:22

## 2019-08-27 RX ADMIN — LISINOPRIL 40 MG: 40 TABLET ORAL at 09:41

## 2019-08-27 RX ADMIN — SODIUM CHLORIDE, PRESERVATIVE FREE 3 ML: 5 INJECTION INTRAVENOUS at 20:22

## 2019-08-27 RX ADMIN — LIDOCAINE 2 PATCH: 50 PATCH TOPICAL at 09:41

## 2019-08-27 RX ADMIN — SODIUM CHLORIDE, PRESERVATIVE FREE 3 ML: 5 INJECTION INTRAVENOUS at 12:07

## 2019-08-27 RX ADMIN — AMLODIPINE BESYLATE 10 MG: 10 TABLET ORAL at 09:41

## 2019-08-27 RX ADMIN — METOPROLOL SUCCINATE 25 MG: 25 TABLET, FILM COATED, EXTENDED RELEASE ORAL at 09:41

## 2019-08-27 RX ADMIN — ACETAMINOPHEN ORAL SOLUTION 650 MG: 325 SOLUTION ORAL at 06:34

## 2019-08-28 ENCOUNTER — APPOINTMENT (OUTPATIENT)
Dept: GENERAL RADIOLOGY | Facility: HOSPITAL | Age: 64
End: 2019-08-28

## 2019-08-28 PROBLEM — D72.829 LEUKOCYTOSIS: Status: ACTIVE | Noted: 2019-08-28

## 2019-08-28 PROBLEM — R50.9 FEVER: Status: ACTIVE | Noted: 2019-08-28

## 2019-08-28 PROBLEM — K59.00 CONSTIPATION: Status: ACTIVE | Noted: 2019-08-28

## 2019-08-28 LAB
ALBUMIN SERPL-MCNC: 3.1 G/DL (ref 3.5–5.2)
ALBUMIN/GLOB SERPL: 1.2 G/DL
ALP SERPL-CCNC: 38 U/L (ref 39–117)
ALT SERPL W P-5'-P-CCNC: 29 U/L (ref 1–41)
ANION GAP SERPL CALCULATED.3IONS-SCNC: 9.9 MMOL/L (ref 5–15)
AST SERPL-CCNC: 26 U/L (ref 1–40)
BILIRUB SERPL-MCNC: 0.5 MG/DL (ref 0.2–1.2)
BUN BLD-MCNC: 24 MG/DL (ref 8–23)
BUN/CREAT SERPL: 23.1 (ref 7–25)
CALCIUM SPEC-SCNC: 8.4 MG/DL (ref 8.6–10.5)
CHLORIDE SERPL-SCNC: 99 MMOL/L (ref 98–107)
CK SERPL-CCNC: 403 U/L (ref 20–200)
CO2 SERPL-SCNC: 26.1 MMOL/L (ref 22–29)
CREAT BLD-MCNC: 1.04 MG/DL (ref 0.76–1.27)
DEPRECATED RDW RBC AUTO: 44.2 FL (ref 37–54)
ERYTHROCYTE [DISTWIDTH] IN BLOOD BY AUTOMATED COUNT: 12.5 % (ref 12.3–15.4)
GFR SERPL CREATININE-BSD FRML MDRD: 72 ML/MIN/1.73
GLOBULIN UR ELPH-MCNC: 2.5 GM/DL
GLUCOSE BLD-MCNC: 227 MG/DL (ref 65–99)
GLUCOSE BLDC GLUCOMTR-MCNC: 153 MG/DL (ref 70–130)
GLUCOSE BLDC GLUCOMTR-MCNC: 172 MG/DL (ref 70–130)
GLUCOSE BLDC GLUCOMTR-MCNC: 213 MG/DL (ref 70–130)
GLUCOSE BLDC GLUCOMTR-MCNC: 92 MG/DL (ref 70–130)
HCT VFR BLD AUTO: 34.5 % (ref 37.5–51)
HGB BLD-MCNC: 11.2 G/DL (ref 13–17.7)
MCH RBC QN AUTO: 31.1 PG (ref 26.6–33)
MCHC RBC AUTO-ENTMCNC: 32.5 G/DL (ref 31.5–35.7)
MCV RBC AUTO: 95.8 FL (ref 79–97)
PLATELET # BLD AUTO: 324 10*3/MM3 (ref 140–450)
PMV BLD AUTO: 10 FL (ref 6–12)
POTASSIUM BLD-SCNC: 4 MMOL/L (ref 3.5–5.2)
PROCALCITONIN SERPL-MCNC: 0.13 NG/ML (ref 0.1–0.25)
PROT SERPL-MCNC: 5.6 G/DL (ref 6–8.5)
RBC # BLD AUTO: 3.6 10*6/MM3 (ref 4.14–5.8)
SODIUM BLD-SCNC: 135 MMOL/L (ref 136–145)
TROPONIN T SERPL-MCNC: <0.01 NG/ML (ref 0–0.03)
WBC NRBC COR # BLD: 18.3 10*3/MM3 (ref 3.4–10.8)

## 2019-08-28 PROCEDURE — 85027 COMPLETE CBC AUTOMATED: CPT | Performed by: NURSE PRACTITIONER

## 2019-08-28 PROCEDURE — 82962 GLUCOSE BLOOD TEST: CPT

## 2019-08-28 PROCEDURE — 97110 THERAPEUTIC EXERCISES: CPT

## 2019-08-28 PROCEDURE — 71045 X-RAY EXAM CHEST 1 VIEW: CPT

## 2019-08-28 PROCEDURE — 84145 PROCALCITONIN (PCT): CPT | Performed by: HOSPITALIST

## 2019-08-28 PROCEDURE — 63710000001 INSULIN LISPRO (HUMAN) PER 5 UNITS: Performed by: NURSE PRACTITIONER

## 2019-08-28 PROCEDURE — 25010000002 ENOXAPARIN PER 10 MG: Performed by: NURSE PRACTITIONER

## 2019-08-28 PROCEDURE — 80053 COMPREHEN METABOLIC PANEL: CPT | Performed by: HOSPITALIST

## 2019-08-28 PROCEDURE — 82550 ASSAY OF CK (CPK): CPT | Performed by: HOSPITALIST

## 2019-08-28 PROCEDURE — 87040 BLOOD CULTURE FOR BACTERIA: CPT | Performed by: HOSPITALIST

## 2019-08-28 PROCEDURE — 84484 ASSAY OF TROPONIN QUANT: CPT | Performed by: HOSPITALIST

## 2019-08-28 RX ORDER — BISACODYL 10 MG
10 SUPPOSITORY, RECTAL RECTAL DAILY PRN
Status: DISCONTINUED | OUTPATIENT
Start: 2019-08-28 | End: 2019-09-03 | Stop reason: HOSPADM

## 2019-08-28 RX ORDER — DOCUSATE SODIUM 100 MG/1
100 CAPSULE, LIQUID FILLED ORAL 2 TIMES DAILY
Status: DISCONTINUED | OUTPATIENT
Start: 2019-08-28 | End: 2019-09-03 | Stop reason: HOSPADM

## 2019-08-28 RX ORDER — HYDROCODONE BITARTRATE AND ACETAMINOPHEN 7.5; 325 MG/1; MG/1
1 TABLET ORAL EVERY 6 HOURS PRN
Status: DISCONTINUED | OUTPATIENT
Start: 2019-08-28 | End: 2019-09-03 | Stop reason: HOSPADM

## 2019-08-28 RX ADMIN — ASPIRIN 81 MG: 81 TABLET, COATED ORAL at 09:40

## 2019-08-28 RX ADMIN — POLYETHYLENE GLYCOL 3350 17 G: 17 POWDER, FOR SOLUTION ORAL at 09:40

## 2019-08-28 RX ADMIN — LIDOCAINE 2 PATCH: 50 PATCH TOPICAL at 09:40

## 2019-08-28 RX ADMIN — ACETAMINOPHEN 650 MG: 325 TABLET, FILM COATED ORAL at 18:19

## 2019-08-28 RX ADMIN — INSULIN LISPRO 2 UNITS: 100 INJECTION, SOLUTION INTRAVENOUS; SUBCUTANEOUS at 17:51

## 2019-08-28 RX ADMIN — POLYETHYLENE GLYCOL 3350 17 G: 17 POWDER, FOR SOLUTION ORAL at 17:50

## 2019-08-28 RX ADMIN — ACETAMINOPHEN ORAL SOLUTION 650 MG: 325 SOLUTION ORAL at 06:08

## 2019-08-28 RX ADMIN — LISINOPRIL 40 MG: 40 TABLET ORAL at 09:40

## 2019-08-28 RX ADMIN — ATORVASTATIN CALCIUM 20 MG: 20 TABLET, FILM COATED ORAL at 09:40

## 2019-08-28 RX ADMIN — AMLODIPINE BESYLATE 10 MG: 10 TABLET ORAL at 09:40

## 2019-08-28 RX ADMIN — HYDRALAZINE HYDROCHLORIDE 50 MG: 50 TABLET, FILM COATED ORAL at 19:59

## 2019-08-28 RX ADMIN — POLYETHYLENE GLYCOL 3350 17 G: 17 POWDER, FOR SOLUTION ORAL at 19:53

## 2019-08-28 RX ADMIN — DOCUSATE SODIUM 100 MG: 100 CAPSULE, LIQUID FILLED ORAL at 19:53

## 2019-08-28 RX ADMIN — HYDROCODONE BITARTRATE AND ACETAMINOPHEN 1 TABLET: 7.5; 325 TABLET ORAL at 19:51

## 2019-08-28 RX ADMIN — INSULIN LISPRO 2 UNITS: 100 INJECTION, SOLUTION INTRAVENOUS; SUBCUTANEOUS at 09:40

## 2019-08-28 RX ADMIN — ENOXAPARIN SODIUM 40 MG: 40 INJECTION SUBCUTANEOUS at 17:50

## 2019-08-28 RX ADMIN — METOPROLOL SUCCINATE 25 MG: 25 TABLET, FILM COATED, EXTENDED RELEASE ORAL at 09:40

## 2019-08-28 RX ADMIN — INSULIN LISPRO 3 UNITS: 100 INJECTION, SOLUTION INTRAVENOUS; SUBCUTANEOUS at 13:33

## 2019-08-28 RX ADMIN — SODIUM CHLORIDE, PRESERVATIVE FREE 3 ML: 5 INJECTION INTRAVENOUS at 20:03

## 2019-08-28 RX ADMIN — ACETAMINOPHEN 650 MG: 325 TABLET, FILM COATED ORAL at 11:32

## 2019-08-28 RX ADMIN — HYDRALAZINE HYDROCHLORIDE 50 MG: 50 TABLET, FILM COATED ORAL at 09:40

## 2019-08-28 RX ADMIN — HYDROCODONE BITARTRATE AND ACETAMINOPHEN 1 TABLET: 7.5; 325 TABLET ORAL at 13:43

## 2019-08-28 RX ADMIN — SODIUM CHLORIDE, PRESERVATIVE FREE 3 ML: 5 INJECTION INTRAVENOUS at 09:46

## 2019-08-28 RX ADMIN — INSULIN LISPRO 2 UNITS: 100 INJECTION, SOLUTION INTRAVENOUS; SUBCUTANEOUS at 21:03

## 2019-08-28 RX ADMIN — ACETAMINOPHEN ORAL SOLUTION 650 MG: 325 SOLUTION ORAL at 00:08

## 2019-08-29 ENCOUNTER — APPOINTMENT (OUTPATIENT)
Dept: CT IMAGING | Facility: HOSPITAL | Age: 64
End: 2019-08-29

## 2019-08-29 PROBLEM — S22.42XA CLOSED FRACTURE OF MULTIPLE RIBS OF LEFT SIDE: Status: ACTIVE | Noted: 2019-08-29

## 2019-08-29 LAB
ALBUMIN SERPL-MCNC: 3.1 G/DL (ref 3.5–5.2)
ALBUMIN/GLOB SERPL: 1.1 G/DL
ALP SERPL-CCNC: 38 U/L (ref 39–117)
ALT SERPL W P-5'-P-CCNC: 26 U/L (ref 1–41)
ANION GAP SERPL CALCULATED.3IONS-SCNC: 9 MMOL/L (ref 5–15)
AST SERPL-CCNC: 21 U/L (ref 1–40)
BASOPHILS # BLD AUTO: 0.03 10*3/MM3 (ref 0–0.2)
BASOPHILS NFR BLD AUTO: 0.2 % (ref 0–1.5)
BILIRUB SERPL-MCNC: 0.6 MG/DL (ref 0.2–1.2)
BUN BLD-MCNC: 29 MG/DL (ref 8–23)
BUN/CREAT SERPL: 26.4 (ref 7–25)
CALCIUM SPEC-SCNC: 8.5 MG/DL (ref 8.6–10.5)
CHLORIDE SERPL-SCNC: 96 MMOL/L (ref 98–107)
CO2 SERPL-SCNC: 28 MMOL/L (ref 22–29)
CREAT BLD-MCNC: 1.1 MG/DL (ref 0.76–1.27)
CRP SERPL-MCNC: 7.32 MG/DL (ref 0–0.5)
DEPRECATED RDW RBC AUTO: 44.3 FL (ref 37–54)
EOSINOPHIL # BLD AUTO: 0.08 10*3/MM3 (ref 0–0.4)
EOSINOPHIL NFR BLD AUTO: 0.5 % (ref 0.3–6.2)
ERYTHROCYTE [DISTWIDTH] IN BLOOD BY AUTOMATED COUNT: 12.6 % (ref 12.3–15.4)
GFR SERPL CREATININE-BSD FRML MDRD: 68 ML/MIN/1.73
GLOBULIN UR ELPH-MCNC: 2.7 GM/DL
GLUCOSE BLD-MCNC: 126 MG/DL (ref 65–99)
GLUCOSE BLDC GLUCOMTR-MCNC: 134 MG/DL (ref 70–130)
GLUCOSE BLDC GLUCOMTR-MCNC: 136 MG/DL (ref 70–130)
GLUCOSE BLDC GLUCOMTR-MCNC: 187 MG/DL (ref 70–130)
GLUCOSE BLDC GLUCOMTR-MCNC: 70 MG/DL (ref 70–130)
GLUCOSE BLDC GLUCOMTR-MCNC: 95 MG/DL (ref 70–130)
HCT VFR BLD AUTO: 35.9 % (ref 37.5–51)
HGB BLD-MCNC: 11.5 G/DL (ref 13–17.7)
IMM GRANULOCYTES # BLD AUTO: 0.09 10*3/MM3 (ref 0–0.05)
IMM GRANULOCYTES NFR BLD AUTO: 0.5 % (ref 0–0.5)
LYMPHOCYTES # BLD AUTO: 2.42 10*3/MM3 (ref 0.7–3.1)
LYMPHOCYTES NFR BLD AUTO: 14.3 % (ref 19.6–45.3)
MCH RBC QN AUTO: 30.4 PG (ref 26.6–33)
MCHC RBC AUTO-ENTMCNC: 32 G/DL (ref 31.5–35.7)
MCV RBC AUTO: 95 FL (ref 79–97)
MONOCYTES # BLD AUTO: 1.87 10*3/MM3 (ref 0.1–0.9)
MONOCYTES NFR BLD AUTO: 11 % (ref 5–12)
NEUTROPHILS # BLD AUTO: 12.49 10*3/MM3 (ref 1.7–7)
NEUTROPHILS NFR BLD AUTO: 73.5 % (ref 42.7–76)
NRBC BLD AUTO-RTO: 0 /100 WBC (ref 0–0.2)
PLATELET # BLD AUTO: 350 10*3/MM3 (ref 140–450)
PMV BLD AUTO: 9.7 FL (ref 6–12)
POTASSIUM BLD-SCNC: 4.1 MMOL/L (ref 3.5–5.2)
PROT SERPL-MCNC: 5.8 G/DL (ref 6–8.5)
RBC # BLD AUTO: 3.78 10*6/MM3 (ref 4.14–5.8)
SODIUM BLD-SCNC: 133 MMOL/L (ref 136–145)
WBC NRBC COR # BLD: 16.98 10*3/MM3 (ref 3.4–10.8)

## 2019-08-29 PROCEDURE — 25010000002 ONDANSETRON PER 1 MG: Performed by: NURSE PRACTITIONER

## 2019-08-29 PROCEDURE — 86140 C-REACTIVE PROTEIN: CPT | Performed by: HOSPITALIST

## 2019-08-29 PROCEDURE — 99222 1ST HOSP IP/OBS MODERATE 55: CPT | Performed by: NURSE PRACTITIONER

## 2019-08-29 PROCEDURE — 82962 GLUCOSE BLOOD TEST: CPT

## 2019-08-29 PROCEDURE — 97110 THERAPEUTIC EXERCISES: CPT

## 2019-08-29 PROCEDURE — 80053 COMPREHEN METABOLIC PANEL: CPT | Performed by: HOSPITALIST

## 2019-08-29 PROCEDURE — 25010000002 ENOXAPARIN PER 10 MG: Performed by: NURSE PRACTITIONER

## 2019-08-29 PROCEDURE — 85025 COMPLETE CBC W/AUTO DIFF WBC: CPT | Performed by: HOSPITALIST

## 2019-08-29 PROCEDURE — 71250 CT THORAX DX C-: CPT

## 2019-08-29 PROCEDURE — 63710000001 INSULIN LISPRO (HUMAN) PER 5 UNITS: Performed by: NURSE PRACTITIONER

## 2019-08-29 RX ORDER — AMOXICILLIN AND CLAVULANATE POTASSIUM 875; 125 MG/1; MG/1
1 TABLET, FILM COATED ORAL EVERY 12 HOURS SCHEDULED
Status: DISCONTINUED | OUTPATIENT
Start: 2019-08-29 | End: 2019-09-03 | Stop reason: HOSPADM

## 2019-08-29 RX ADMIN — INSULIN LISPRO 2 UNITS: 100 INJECTION, SOLUTION INTRAVENOUS; SUBCUTANEOUS at 12:40

## 2019-08-29 RX ADMIN — AMLODIPINE BESYLATE 10 MG: 10 TABLET ORAL at 08:23

## 2019-08-29 RX ADMIN — SODIUM CHLORIDE, PRESERVATIVE FREE 3 ML: 5 INJECTION INTRAVENOUS at 20:22

## 2019-08-29 RX ADMIN — ENOXAPARIN SODIUM 40 MG: 40 INJECTION SUBCUTANEOUS at 19:31

## 2019-08-29 RX ADMIN — HYDROCODONE BITARTRATE AND ACETAMINOPHEN 1 TABLET: 7.5; 325 TABLET ORAL at 11:05

## 2019-08-29 RX ADMIN — AMOXICILLIN AND CLAVULANATE POTASSIUM 1 TABLET: 875; 125 TABLET, FILM COATED ORAL at 20:20

## 2019-08-29 RX ADMIN — ACETAMINOPHEN 650 MG: 325 TABLET, FILM COATED ORAL at 20:20

## 2019-08-29 RX ADMIN — HYDROCODONE BITARTRATE AND ACETAMINOPHEN 1 TABLET: 7.5; 325 TABLET ORAL at 23:15

## 2019-08-29 RX ADMIN — METOPROLOL SUCCINATE 25 MG: 25 TABLET, FILM COATED, EXTENDED RELEASE ORAL at 08:23

## 2019-08-29 RX ADMIN — DOCUSATE SODIUM 100 MG: 100 CAPSULE, LIQUID FILLED ORAL at 20:20

## 2019-08-29 RX ADMIN — HYDRALAZINE HYDROCHLORIDE 50 MG: 50 TABLET, FILM COATED ORAL at 20:20

## 2019-08-29 RX ADMIN — HYDROCODONE BITARTRATE AND ACETAMINOPHEN 1 TABLET: 7.5; 325 TABLET ORAL at 04:13

## 2019-08-29 RX ADMIN — ASPIRIN 81 MG: 81 TABLET, COATED ORAL at 08:23

## 2019-08-29 RX ADMIN — POLYETHYLENE GLYCOL 3350 17 G: 17 POWDER, FOR SOLUTION ORAL at 08:22

## 2019-08-29 RX ADMIN — SODIUM CHLORIDE, PRESERVATIVE FREE 3 ML: 5 INJECTION INTRAVENOUS at 08:22

## 2019-08-29 RX ADMIN — HYDROCODONE BITARTRATE AND ACETAMINOPHEN 1 TABLET: 7.5; 325 TABLET ORAL at 17:04

## 2019-08-29 RX ADMIN — ACETAMINOPHEN 650 MG: 325 TABLET, FILM COATED ORAL at 12:39

## 2019-08-29 RX ADMIN — DOCUSATE SODIUM 100 MG: 100 CAPSULE, LIQUID FILLED ORAL at 08:23

## 2019-08-29 RX ADMIN — POLYETHYLENE GLYCOL 3350 17 G: 17 POWDER, FOR SOLUTION ORAL at 16:15

## 2019-08-29 RX ADMIN — LIDOCAINE 2 PATCH: 50 PATCH TOPICAL at 08:23

## 2019-08-29 RX ADMIN — POLYETHYLENE GLYCOL 3350 17 G: 17 POWDER, FOR SOLUTION ORAL at 20:20

## 2019-08-29 RX ADMIN — ONDANSETRON 4 MG: 2 INJECTION INTRAMUSCULAR; INTRAVENOUS at 02:06

## 2019-08-29 RX ADMIN — LISINOPRIL 40 MG: 40 TABLET ORAL at 08:23

## 2019-08-29 RX ADMIN — ATORVASTATIN CALCIUM 20 MG: 20 TABLET, FILM COATED ORAL at 08:23

## 2019-08-30 ENCOUNTER — APPOINTMENT (OUTPATIENT)
Dept: ULTRASOUND IMAGING | Facility: HOSPITAL | Age: 64
End: 2019-08-30

## 2019-08-30 ENCOUNTER — APPOINTMENT (OUTPATIENT)
Dept: GENERAL RADIOLOGY | Facility: HOSPITAL | Age: 64
End: 2019-08-30

## 2019-08-30 PROBLEM — J90 PLEURAL EFFUSION: Status: ACTIVE | Noted: 2019-08-30

## 2019-08-30 PROBLEM — J18.9 PNEUMONIA: Status: ACTIVE | Noted: 2019-08-30

## 2019-08-30 LAB
APTT PPP: 29.6 SECONDS (ref 22.7–35.4)
GLUCOSE BLD-MCNC: 99 MG/DL (ref 65–99)
GLUCOSE BLDC GLUCOMTR-MCNC: 121 MG/DL (ref 70–130)
GLUCOSE BLDC GLUCOMTR-MCNC: 135 MG/DL (ref 70–130)
GLUCOSE BLDC GLUCOMTR-MCNC: 142 MG/DL (ref 70–130)
GLUCOSE BLDC GLUCOMTR-MCNC: 146 MG/DL (ref 70–130)
INR PPP: 1.05 (ref 0.9–1.1)
LDH SERPL-CCNC: 222 U/L (ref 135–225)
MAGNESIUM SERPL-MCNC: 2 MG/DL (ref 1.6–2.4)
POTASSIUM BLD-SCNC: 4.3 MMOL/L (ref 3.5–5.2)
PROT SERPL-MCNC: 5.8 G/DL (ref 6–8.5)
PROTHROMBIN TIME: 13.4 SECONDS (ref 11.7–14.2)
TROPONIN T SERPL-MCNC: <0.01 NG/ML (ref 0–0.03)

## 2019-08-30 PROCEDURE — 83735 ASSAY OF MAGNESIUM: CPT | Performed by: HOSPITALIST

## 2019-08-30 PROCEDURE — 84484 ASSAY OF TROPONIN QUANT: CPT | Performed by: HOSPITALIST

## 2019-08-30 PROCEDURE — 93010 ELECTROCARDIOGRAM REPORT: CPT | Performed by: INTERNAL MEDICINE

## 2019-08-30 PROCEDURE — 93005 ELECTROCARDIOGRAM TRACING: CPT | Performed by: HOSPITALIST

## 2019-08-30 PROCEDURE — 85610 PROTHROMBIN TIME: CPT | Performed by: NURSE PRACTITIONER

## 2019-08-30 PROCEDURE — 84155 ASSAY OF PROTEIN SERUM: CPT | Performed by: NURSE PRACTITIONER

## 2019-08-30 PROCEDURE — 76942 ECHO GUIDE FOR BIOPSY: CPT

## 2019-08-30 PROCEDURE — 0W9B3ZZ DRAINAGE OF LEFT PLEURAL CAVITY, PERCUTANEOUS APPROACH: ICD-10-PCS | Performed by: RADIOLOGY

## 2019-08-30 PROCEDURE — 82947 ASSAY GLUCOSE BLOOD QUANT: CPT | Performed by: NURSE PRACTITIONER

## 2019-08-30 PROCEDURE — 83615 LACTATE (LD) (LDH) ENZYME: CPT | Performed by: NURSE PRACTITIONER

## 2019-08-30 PROCEDURE — 25010000003 LIDOCAINE 1 % SOLUTION: Performed by: RADIOLOGY

## 2019-08-30 PROCEDURE — 71045 X-RAY EXAM CHEST 1 VIEW: CPT

## 2019-08-30 PROCEDURE — 84132 ASSAY OF SERUM POTASSIUM: CPT | Performed by: HOSPITALIST

## 2019-08-30 PROCEDURE — 82962 GLUCOSE BLOOD TEST: CPT

## 2019-08-30 PROCEDURE — 85730 THROMBOPLASTIN TIME PARTIAL: CPT | Performed by: NURSE PRACTITIONER

## 2019-08-30 PROCEDURE — 99232 SBSQ HOSP IP/OBS MODERATE 35: CPT | Performed by: NURSE PRACTITIONER

## 2019-08-30 RX ORDER — LIDOCAINE HYDROCHLORIDE 10 MG/ML
20 INJECTION, SOLUTION INFILTRATION; PERINEURAL ONCE
Status: COMPLETED | OUTPATIENT
Start: 2019-08-30 | End: 2019-08-30

## 2019-08-30 RX ADMIN — AMOXICILLIN AND CLAVULANATE POTASSIUM 1 TABLET: 875; 125 TABLET, FILM COATED ORAL at 08:57

## 2019-08-30 RX ADMIN — ACETAMINOPHEN 650 MG: 325 TABLET, FILM COATED ORAL at 04:50

## 2019-08-30 RX ADMIN — HYDROCODONE BITARTRATE AND ACETAMINOPHEN 1 TABLET: 7.5; 325 TABLET ORAL at 18:16

## 2019-08-30 RX ADMIN — LIDOCAINE HYDROCHLORIDE 20 ML: 10 INJECTION, SOLUTION INFILTRATION; PERINEURAL at 15:46

## 2019-08-30 RX ADMIN — DOCUSATE SODIUM 100 MG: 100 CAPSULE, LIQUID FILLED ORAL at 20:36

## 2019-08-30 RX ADMIN — ACETAMINOPHEN 650 MG: 325 TABLET, FILM COATED ORAL at 00:46

## 2019-08-30 RX ADMIN — AMLODIPINE BESYLATE 10 MG: 10 TABLET ORAL at 08:57

## 2019-08-30 RX ADMIN — LIDOCAINE 2 PATCH: 50 PATCH TOPICAL at 09:01

## 2019-08-30 RX ADMIN — AMOXICILLIN AND CLAVULANATE POTASSIUM 1 TABLET: 875; 125 TABLET, FILM COATED ORAL at 20:37

## 2019-08-30 RX ADMIN — DOCUSATE SODIUM 100 MG: 100 CAPSULE, LIQUID FILLED ORAL at 08:57

## 2019-08-30 RX ADMIN — HYDRALAZINE HYDROCHLORIDE 50 MG: 50 TABLET, FILM COATED ORAL at 20:37

## 2019-08-30 RX ADMIN — LISINOPRIL 40 MG: 40 TABLET ORAL at 08:57

## 2019-08-30 RX ADMIN — SODIUM CHLORIDE, PRESERVATIVE FREE 3 ML: 5 INJECTION INTRAVENOUS at 20:37

## 2019-08-30 RX ADMIN — POLYETHYLENE GLYCOL 3350 17 G: 17 POWDER, FOR SOLUTION ORAL at 20:42

## 2019-08-30 RX ADMIN — POLYETHYLENE GLYCOL 3350 17 G: 17 POWDER, FOR SOLUTION ORAL at 17:27

## 2019-08-30 RX ADMIN — POLYETHYLENE GLYCOL 3350 17 G: 17 POWDER, FOR SOLUTION ORAL at 08:57

## 2019-08-30 RX ADMIN — HYDROCODONE BITARTRATE AND ACETAMINOPHEN 1 TABLET: 7.5; 325 TABLET ORAL at 05:26

## 2019-08-30 RX ADMIN — HYDRALAZINE HYDROCHLORIDE 50 MG: 50 TABLET, FILM COATED ORAL at 08:57

## 2019-08-30 RX ADMIN — SODIUM CHLORIDE, PRESERVATIVE FREE 3 ML: 5 INJECTION INTRAVENOUS at 09:01

## 2019-08-30 RX ADMIN — HYDROCODONE BITARTRATE AND ACETAMINOPHEN 1 TABLET: 7.5; 325 TABLET ORAL at 23:58

## 2019-08-30 RX ADMIN — ATORVASTATIN CALCIUM 20 MG: 20 TABLET, FILM COATED ORAL at 08:57

## 2019-08-31 ENCOUNTER — APPOINTMENT (OUTPATIENT)
Dept: GENERAL RADIOLOGY | Facility: HOSPITAL | Age: 64
End: 2019-08-31

## 2019-08-31 LAB
ALBUMIN SERPL-MCNC: 3.7 G/DL (ref 3.5–5.2)
ALBUMIN/GLOB SERPL: 1.4 G/DL
ALP SERPL-CCNC: 51 U/L (ref 39–117)
ALT SERPL W P-5'-P-CCNC: 22 U/L (ref 1–41)
ANION GAP SERPL CALCULATED.3IONS-SCNC: 11.2 MMOL/L (ref 5–15)
AST SERPL-CCNC: 18 U/L (ref 1–40)
BASOPHILS # BLD AUTO: 0.03 10*3/MM3 (ref 0–0.2)
BASOPHILS NFR BLD AUTO: 0.2 % (ref 0–1.5)
BILIRUB SERPL-MCNC: 0.5 MG/DL (ref 0.2–1.2)
BUN BLD-MCNC: 23 MG/DL (ref 8–23)
BUN/CREAT SERPL: 22.1 (ref 7–25)
CALCIUM SPEC-SCNC: 9.1 MG/DL (ref 8.6–10.5)
CHLORIDE SERPL-SCNC: 96 MMOL/L (ref 98–107)
CO2 SERPL-SCNC: 27.8 MMOL/L (ref 22–29)
CREAT BLD-MCNC: 1.04 MG/DL (ref 0.76–1.27)
DEPRECATED RDW RBC AUTO: 43.8 FL (ref 37–54)
EOSINOPHIL # BLD AUTO: 0.16 10*3/MM3 (ref 0–0.4)
EOSINOPHIL NFR BLD AUTO: 1.2 % (ref 0.3–6.2)
ERYTHROCYTE [DISTWIDTH] IN BLOOD BY AUTOMATED COUNT: 12.4 % (ref 12.3–15.4)
GFR SERPL CREATININE-BSD FRML MDRD: 72 ML/MIN/1.73
GLOBULIN UR ELPH-MCNC: 2.6 GM/DL
GLUCOSE BLD-MCNC: 134 MG/DL (ref 65–99)
GLUCOSE BLDC GLUCOMTR-MCNC: 167 MG/DL (ref 70–130)
GLUCOSE BLDC GLUCOMTR-MCNC: 179 MG/DL (ref 70–130)
HCT VFR BLD AUTO: 35 % (ref 37.5–51)
HGB BLD-MCNC: 11.2 G/DL (ref 13–17.7)
IMM GRANULOCYTES # BLD AUTO: 0.06 10*3/MM3 (ref 0–0.05)
IMM GRANULOCYTES NFR BLD AUTO: 0.5 % (ref 0–0.5)
LYMPHOCYTES # BLD AUTO: 2.44 10*3/MM3 (ref 0.7–3.1)
LYMPHOCYTES NFR BLD AUTO: 18.8 % (ref 19.6–45.3)
MCH RBC QN AUTO: 30.7 PG (ref 26.6–33)
MCHC RBC AUTO-ENTMCNC: 32 G/DL (ref 31.5–35.7)
MCV RBC AUTO: 95.9 FL (ref 79–97)
MONOCYTES # BLD AUTO: 1.48 10*3/MM3 (ref 0.1–0.9)
MONOCYTES NFR BLD AUTO: 11.4 % (ref 5–12)
NEUTROPHILS # BLD AUTO: 8.81 10*3/MM3 (ref 1.7–7)
NEUTROPHILS NFR BLD AUTO: 67.9 % (ref 42.7–76)
NRBC BLD AUTO-RTO: 0 /100 WBC (ref 0–0.2)
PLATELET # BLD AUTO: 380 10*3/MM3 (ref 140–450)
PMV BLD AUTO: 8.7 FL (ref 6–12)
POTASSIUM BLD-SCNC: 4.3 MMOL/L (ref 3.5–5.2)
PROCALCITONIN SERPL-MCNC: 0.11 NG/ML (ref 0.1–0.25)
PROT SERPL-MCNC: 6.3 G/DL (ref 6–8.5)
RBC # BLD AUTO: 3.65 10*6/MM3 (ref 4.14–5.8)
SODIUM BLD-SCNC: 135 MMOL/L (ref 136–145)
WBC NRBC COR # BLD: 12.98 10*3/MM3 (ref 3.4–10.8)

## 2019-08-31 PROCEDURE — 82962 GLUCOSE BLOOD TEST: CPT

## 2019-08-31 PROCEDURE — 99232 SBSQ HOSP IP/OBS MODERATE 35: CPT | Performed by: THORACIC SURGERY (CARDIOTHORACIC VASCULAR SURGERY)

## 2019-08-31 PROCEDURE — 71045 X-RAY EXAM CHEST 1 VIEW: CPT

## 2019-08-31 PROCEDURE — 85025 COMPLETE CBC W/AUTO DIFF WBC: CPT | Performed by: HOSPITALIST

## 2019-08-31 PROCEDURE — 63710000001 INSULIN LISPRO (HUMAN) PER 5 UNITS: Performed by: NURSE PRACTITIONER

## 2019-08-31 PROCEDURE — 84145 PROCALCITONIN (PCT): CPT | Performed by: HOSPITALIST

## 2019-08-31 PROCEDURE — 25010000002 ENOXAPARIN PER 10 MG: Performed by: NURSE PRACTITIONER

## 2019-08-31 PROCEDURE — 80053 COMPREHEN METABOLIC PANEL: CPT | Performed by: HOSPITALIST

## 2019-08-31 RX ADMIN — ACETAMINOPHEN 650 MG: 325 TABLET, FILM COATED ORAL at 14:55

## 2019-08-31 RX ADMIN — AMLODIPINE BESYLATE 10 MG: 10 TABLET ORAL at 09:14

## 2019-08-31 RX ADMIN — AMOXICILLIN AND CLAVULANATE POTASSIUM 1 TABLET: 875; 125 TABLET, FILM COATED ORAL at 21:36

## 2019-08-31 RX ADMIN — INSULIN LISPRO 2 UNITS: 100 INJECTION, SOLUTION INTRAVENOUS; SUBCUTANEOUS at 12:23

## 2019-08-31 RX ADMIN — HYDROCODONE BITARTRATE AND ACETAMINOPHEN 1 TABLET: 7.5; 325 TABLET ORAL at 19:31

## 2019-08-31 RX ADMIN — POLYETHYLENE GLYCOL 3350 17 G: 17 POWDER, FOR SOLUTION ORAL at 09:12

## 2019-08-31 RX ADMIN — LIDOCAINE 2 PATCH: 50 PATCH TOPICAL at 09:12

## 2019-08-31 RX ADMIN — ACETAMINOPHEN 650 MG: 325 TABLET, FILM COATED ORAL at 21:36

## 2019-08-31 RX ADMIN — METOPROLOL SUCCINATE 25 MG: 25 TABLET, FILM COATED, EXTENDED RELEASE ORAL at 09:14

## 2019-08-31 RX ADMIN — LISINOPRIL 40 MG: 40 TABLET ORAL at 09:14

## 2019-08-31 RX ADMIN — HYDROCODONE BITARTRATE AND ACETAMINOPHEN 1 TABLET: 7.5; 325 TABLET ORAL at 06:45

## 2019-08-31 RX ADMIN — INSULIN LISPRO 2 UNITS: 100 INJECTION, SOLUTION INTRAVENOUS; SUBCUTANEOUS at 18:44

## 2019-08-31 RX ADMIN — DOCUSATE SODIUM 100 MG: 100 CAPSULE, LIQUID FILLED ORAL at 09:14

## 2019-08-31 RX ADMIN — HYDRALAZINE HYDROCHLORIDE 50 MG: 50 TABLET, FILM COATED ORAL at 09:14

## 2019-08-31 RX ADMIN — SODIUM CHLORIDE, PRESERVATIVE FREE 3 ML: 5 INJECTION INTRAVENOUS at 09:15

## 2019-08-31 RX ADMIN — SODIUM CHLORIDE, PRESERVATIVE FREE 3 ML: 5 INJECTION INTRAVENOUS at 21:37

## 2019-08-31 RX ADMIN — ASPIRIN 81 MG: 81 TABLET, COATED ORAL at 09:14

## 2019-08-31 RX ADMIN — HYDROCODONE BITARTRATE AND ACETAMINOPHEN 1 TABLET: 7.5; 325 TABLET ORAL at 13:16

## 2019-08-31 RX ADMIN — AMOXICILLIN AND CLAVULANATE POTASSIUM 1 TABLET: 875; 125 TABLET, FILM COATED ORAL at 09:14

## 2019-08-31 RX ADMIN — ATORVASTATIN CALCIUM 20 MG: 20 TABLET, FILM COATED ORAL at 09:14

## 2019-08-31 RX ADMIN — ENOXAPARIN SODIUM 40 MG: 40 INJECTION SUBCUTANEOUS at 18:50

## 2019-09-01 LAB
ANION GAP SERPL CALCULATED.3IONS-SCNC: 10.3 MMOL/L (ref 5–15)
BASOPHILS # BLD AUTO: 0.05 10*3/MM3 (ref 0–0.2)
BASOPHILS NFR BLD AUTO: 0.4 % (ref 0–1.5)
BUN BLD-MCNC: 22 MG/DL (ref 8–23)
BUN/CREAT SERPL: 20.2 (ref 7–25)
CALCIUM SPEC-SCNC: 9.1 MG/DL (ref 8.6–10.5)
CHLORIDE SERPL-SCNC: 97 MMOL/L (ref 98–107)
CO2 SERPL-SCNC: 24.7 MMOL/L (ref 22–29)
CREAT BLD-MCNC: 1.09 MG/DL (ref 0.76–1.27)
DEPRECATED RDW RBC AUTO: 43.3 FL (ref 37–54)
EOSINOPHIL # BLD AUTO: 0.21 10*3/MM3 (ref 0–0.4)
EOSINOPHIL NFR BLD AUTO: 1.9 % (ref 0.3–6.2)
ERYTHROCYTE [DISTWIDTH] IN BLOOD BY AUTOMATED COUNT: 12.5 % (ref 12.3–15.4)
GFR SERPL CREATININE-BSD FRML MDRD: 68 ML/MIN/1.73
GLUCOSE BLD-MCNC: 129 MG/DL (ref 65–99)
GLUCOSE BLDC GLUCOMTR-MCNC: 112 MG/DL (ref 70–130)
GLUCOSE BLDC GLUCOMTR-MCNC: 168 MG/DL (ref 70–130)
GLUCOSE BLDC GLUCOMTR-MCNC: 191 MG/DL (ref 70–130)
GLUCOSE BLDC GLUCOMTR-MCNC: 192 MG/DL (ref 70–130)
GLUCOSE BLDC GLUCOMTR-MCNC: 201 MG/DL (ref 70–130)
GLUCOSE BLDC GLUCOMTR-MCNC: 88 MG/DL (ref 70–130)
HCT VFR BLD AUTO: 34.2 % (ref 37.5–51)
HGB BLD-MCNC: 11.1 G/DL (ref 13–17.7)
IMM GRANULOCYTES # BLD AUTO: 0.03 10*3/MM3 (ref 0–0.05)
IMM GRANULOCYTES NFR BLD AUTO: 0.3 % (ref 0–0.5)
LYMPHOCYTES # BLD AUTO: 2.47 10*3/MM3 (ref 0.7–3.1)
LYMPHOCYTES NFR BLD AUTO: 22 % (ref 19.6–45.3)
MCH RBC QN AUTO: 30.8 PG (ref 26.6–33)
MCHC RBC AUTO-ENTMCNC: 32.5 G/DL (ref 31.5–35.7)
MCV RBC AUTO: 95 FL (ref 79–97)
MONOCYTES # BLD AUTO: 1.21 10*3/MM3 (ref 0.1–0.9)
MONOCYTES NFR BLD AUTO: 10.8 % (ref 5–12)
NEUTROPHILS # BLD AUTO: 7.25 10*3/MM3 (ref 1.7–7)
NEUTROPHILS NFR BLD AUTO: 64.6 % (ref 42.7–76)
NRBC BLD AUTO-RTO: 0 /100 WBC (ref 0–0.2)
PLATELET # BLD AUTO: 394 10*3/MM3 (ref 140–450)
PMV BLD AUTO: 8.7 FL (ref 6–12)
POTASSIUM BLD-SCNC: 4.2 MMOL/L (ref 3.5–5.2)
RBC # BLD AUTO: 3.6 10*6/MM3 (ref 4.14–5.8)
SODIUM BLD-SCNC: 132 MMOL/L (ref 136–145)
WBC NRBC COR # BLD: 11.22 10*3/MM3 (ref 3.4–10.8)

## 2019-09-01 PROCEDURE — 80048 BASIC METABOLIC PNL TOTAL CA: CPT | Performed by: HOSPITALIST

## 2019-09-01 PROCEDURE — 93010 ELECTROCARDIOGRAM REPORT: CPT | Performed by: INTERNAL MEDICINE

## 2019-09-01 PROCEDURE — 85025 COMPLETE CBC W/AUTO DIFF WBC: CPT | Performed by: HOSPITALIST

## 2019-09-01 PROCEDURE — 25010000002 ENOXAPARIN PER 10 MG: Performed by: NURSE PRACTITIONER

## 2019-09-01 PROCEDURE — 63710000001 INSULIN LISPRO (HUMAN) PER 5 UNITS: Performed by: NURSE PRACTITIONER

## 2019-09-01 PROCEDURE — 93005 ELECTROCARDIOGRAM TRACING: CPT | Performed by: HOSPITALIST

## 2019-09-01 PROCEDURE — 82962 GLUCOSE BLOOD TEST: CPT

## 2019-09-01 PROCEDURE — 99232 SBSQ HOSP IP/OBS MODERATE 35: CPT | Performed by: THORACIC SURGERY (CARDIOTHORACIC VASCULAR SURGERY)

## 2019-09-01 PROCEDURE — 97110 THERAPEUTIC EXERCISES: CPT

## 2019-09-01 RX ADMIN — ACETAMINOPHEN 650 MG: 325 TABLET, FILM COATED ORAL at 05:12

## 2019-09-01 RX ADMIN — INSULIN LISPRO 2 UNITS: 100 INJECTION, SOLUTION INTRAVENOUS; SUBCUTANEOUS at 09:06

## 2019-09-01 RX ADMIN — ASPIRIN 81 MG: 81 TABLET, COATED ORAL at 09:13

## 2019-09-01 RX ADMIN — HYDROCODONE BITARTRATE AND ACETAMINOPHEN 1 TABLET: 7.5; 325 TABLET ORAL at 17:43

## 2019-09-01 RX ADMIN — LISINOPRIL 40 MG: 40 TABLET ORAL at 09:12

## 2019-09-01 RX ADMIN — AMLODIPINE BESYLATE 10 MG: 10 TABLET ORAL at 09:13

## 2019-09-01 RX ADMIN — LIDOCAINE 2 PATCH: 50 PATCH TOPICAL at 09:16

## 2019-09-01 RX ADMIN — SODIUM CHLORIDE, PRESERVATIVE FREE 3 ML: 5 INJECTION INTRAVENOUS at 19:54

## 2019-09-01 RX ADMIN — INSULIN LISPRO 2 UNITS: 100 INJECTION, SOLUTION INTRAVENOUS; SUBCUTANEOUS at 12:12

## 2019-09-01 RX ADMIN — SODIUM CHLORIDE, PRESERVATIVE FREE 3 ML: 5 INJECTION INTRAVENOUS at 12:14

## 2019-09-01 RX ADMIN — HYDRALAZINE HYDROCHLORIDE 50 MG: 50 TABLET, FILM COATED ORAL at 09:13

## 2019-09-01 RX ADMIN — AMOXICILLIN AND CLAVULANATE POTASSIUM 1 TABLET: 875; 125 TABLET, FILM COATED ORAL at 09:07

## 2019-09-01 RX ADMIN — AMOXICILLIN AND CLAVULANATE POTASSIUM 1 TABLET: 875; 125 TABLET, FILM COATED ORAL at 19:53

## 2019-09-01 RX ADMIN — ATORVASTATIN CALCIUM 20 MG: 20 TABLET, FILM COATED ORAL at 09:13

## 2019-09-01 RX ADMIN — ENOXAPARIN SODIUM 40 MG: 40 INJECTION SUBCUTANEOUS at 17:33

## 2019-09-01 RX ADMIN — HYDROCODONE BITARTRATE AND ACETAMINOPHEN 1 TABLET: 7.5; 325 TABLET ORAL at 01:38

## 2019-09-01 RX ADMIN — HYDROCODONE BITARTRATE AND ACETAMINOPHEN 1 TABLET: 7.5; 325 TABLET ORAL at 11:19

## 2019-09-01 RX ADMIN — HYDROCODONE BITARTRATE AND ACETAMINOPHEN 1 TABLET: 7.5; 325 TABLET ORAL at 23:22

## 2019-09-01 NOTE — THERAPY TREATMENT NOTE
Acute Care - Physical Therapy Treatment Note  [facility]     Patient Name: Trever Petit  : 1955  MRN: [id number]  Today's Date: 2019             Admit Date: 2019    Visit Dx:    ICD-10-CM ICD-9-CM   1. Hypoglycemia E16.2 251.2   2. Closed fracture of multiple ribs of left side, initial encounter S22.42XA 807.09   3. Leukocytosis, unspecified type D72.829 288.60     Patient Active Problem List   Diagnosis   • Hypertensive emergency   • Essential hypertension   • Stroke (CMS/[facility])   • Hypoglycemia   • Vascular dementia   • Type 2 diabetes mellitus, without long-term current use of insulin (CMS/[facility])   • Constipation   • Leukocytosis   • Fever   • Closed fracture of multiple ribs of left side   • Pleural effusion   • Pneumonia       Therapy Treatment    Rehabilitation Treatment Summary     Row Name 19 1500             Treatment Time/Intention    Discipline  physical therapy assistant  -      Document Type  therapy note (daily note)  -      Subjective Information  complains of;weakness;fatigue;pain  -      Mode of Treatment  individual therapy;group therapy  -      Care Plan Review  patient/other agree to care plan  -      Comment  used gt belt under arms due to rib fxs on L, but pt too unsteady  to not use belt  -      Existing Precautions/Restrictions  fall  -      Recorded by [JIM] Sarah Roman PTA 19 1522      Row Name 19 1500             Bed Mobility Assessment/Treatment    Supine-Sit [place] (Bed Mobility)  minimum assist (75% patient effort);moderate assist (50% patient effort);verbal cues;nonverbal cues (demo/gesture)  -      Sit-Supine [place] (Bed Mobility)  moderate assist (50% patient effort);verbal cues;nonverbal cues (demo/gesture)  -      Bed Mobility, Safety Issues  decreased use of arms for pushing/pulling;decreased use of legs for bridging/pushing;impaired trunk control for bed mobility  -      Assistive Device (Bed Mobility)   bed rails;draw sheet  -JM      Comment (Bed Mobility)  pt also legally blind, but trying to assist to EOB  -JM      Recorded by [JM] Sarah Roman, CYDNEY 09/01/19 1522      Row Name 09/01/19 1500             Sit-Stand Transfer    Sit-Stand [place] (Transfers)  2 person assist;minimum assist (75% patient effort);verbal cues  -      Assistive Device (Sit-Stand Transfers)  walker, front-wheeled  -JM      Recorded by [] Sarah Roman, CYDNEY 09/01/19 1522      Row Name 09/01/19 1500             Gait/Stairs Assessment/Training    Henry Level (Gait)  2 person assist;minimum assist (75% patient effort)  -      Assistive Device (Gait)  walker, front-wheeled  -      Distance in Feet (Gait)  50ft x 2, req standing rest to regain bal-post lean  -JM      Recorded by [] Sarah Roman, CYDNEY 09/01/19 1522      Row Name 09/01/19 1500             Positioning and Restraints    Pre-Treatment Position  in bed  -JM      In Bed  fowlers;call light within reach;encouraged to call for assist;exit alarm on;notified nsg;side rails up x3  -JM      Recorded by [] Sarah Roman, PTA 09/01/19 1522      Row Name 09/01/19 1500             Pain Scale: Numbers Pre/Post-Treatment    Pain Scale: Numbers, Post-Treatment  7/10  -JM      Pain Location - Side  Left  -JM      Pain Location  -- Ribs  -JM      Pain Intervention(s)  Repositioned suggested rib binder to RN, but watching for hematoma  -JM      Recorded by [JM] Sarah Roman, CYDNEY 09/01/19 1522      Row Name                Wound 08/30/19 1718 Puncture    Wound - Properties Group Date first assessed: 08/30/19 [TM] Time first assessed: 1718 [TM] Primary Wound Type: Puncture [TM] Recorded by:  [TM] Cecelia Siegel RN 08/30/19 1718      User Key  (r) = Recorded By, (t) = Taken By, (c) = Cosigned By    Initials Name Effective Dates Discipline     Sarah Roman, PTA 03/07/18 -  PT    TM Cecelia Siegel RN 06/16/16 -  Nurse          Wound 08/30/19 1718 Puncture  (Active)   Dressing Appearance dry;intact;no drainage 9/1/2019  2:00 PM   Closure LUZ MARIA 9/1/2019  2:00 PM   Base dressing in place, unable to visualize 9/1/2019 12:08 AM   Periwound intact;dry 9/1/2019  2:00 PM   Drainage Amount none 9/1/2019  2:00 PM   Dressing Care, Wound gauze;transparent film 9/1/2019  8:34 AM           Physical Therapy Education     Title: PT OT SLP Therapies (Done)     Topic: Physical Therapy (Done)     Point: Mobility training (Done)     Learning Progress Summary           Patient Acceptance, E,TB,D, VU,NR by JIM at 9/1/2019  3:23 PM    Acceptance, E, NR by AR at 8/29/2019  3:42 PM    Acceptance, E,D, NR by MS at 8/28/2019 11:38 AM    Acceptance, E,D, NR by YURI at 8/27/2019 11:55 AM    Acceptance, E,TB, NR by YURI at 8/26/2019  3:28 PM                   Point: Home exercise program (Done)     Learning Progress Summary           Patient Acceptance, E,TB,D, VU,NR by JIM at 9/1/2019  3:23 PM    Acceptance, E, NR by AR at 8/29/2019  3:42 PM    Acceptance, E,D, NR by MS at 8/28/2019 11:38 AM    Acceptance, E,D, NR by YURI at 8/27/2019 11:55 AM    Acceptance, E,TB, NR by YURI at 8/26/2019  3:28 PM                   Point: Body mechanics (Done)     Learning Progress Summary           Patient Acceptance, E,TB,D, VU,NR by JIM at 9/1/2019  3:23 PM    Acceptance, E, NR by AR at 8/29/2019  3:42 PM    Acceptance, E,D, NR by MS at 8/28/2019 11:38 AM    Acceptance, E,D, NR by YURI at 8/27/2019 11:55 AM    Acceptance, E,TB, NR by YURI at 8/26/2019  3:28 PM                   Point: Precautions (Done)     Learning Progress Summary           Patient Acceptance, E,TB,D, VU,NR by JIM at 9/1/2019  3:23 PM    Acceptance, E, NR by AR at 8/29/2019  3:42 PM    Acceptance, E,D, NR by MS at 8/28/2019 11:38 AM    Acceptance, EPORSHA, NR by YURI at 8/27/2019 11:55 AM    Acceptance, BIANCA SARMIENTO, NR by YURI at 8/26/2019  3:28 PM                               User Key     Initials Effective Dates Name Provider Type Discipline    YURI 02/05/19 -  George  Elizabeth Hernandez, PT Physical Therapist PT     03/07/18 -  Sarah Roman PTA Physical Therapy Assistant PT    MS 04/03/18 -  Vargas Tatum, PT Physical Therapist PT    AR 04/03/18 -  Piedad Crowder, PT Physical Therapist PT                PT Recommendation and Plan     Plan of Care Reviewed With: patient  Outcome Summary: pt fatigues quickly, unsteady gt; req assist of 2 for safety; plans SNU  Outcome Measures     Row Name 09/01/19 1500             How much help from another person do you currently need...    Turning from your back to your side while in flat bed without using bedrails?  3  -JM      Moving from lying on back to sitting on the side of a flat bed without bedrails?  2  -JM      Moving to and from a bed to a chair (including a wheelchair)?  3  -JM      Standing up from a chair using your arms (e.g., wheelchair, bedside chair)?  3  -JM      Climbing 3-5 steps with a railing?  1  -JM      To walk in hospital room?  3  -JM      AM-PAC 6 Clicks Score (PT)  15  -        User Key  (r) = Recorded By, (t) = Taken By, (c) = Cosigned By    Initials Name Provider Type     Sarah Roman PTA Physical Therapy Assistant         Time Calculation:   PT Charges     Row Name 09/01/19 1523             Time Calculation    Start Time  1500  -      Stop Time  1515  -      Time Calculation (min)  15 min  -      PT Received On  09/01/19  -      PT - Next Appointment  09/02/19  -         Time Calculation- PT    Total Timed Code Minutes- PT  15 minute(s)  -        User Key  (r) = Recorded By, (t) = Taken By, (c) = Cosigned By    Initials Name Provider Type    Sarah Workman PTA Physical Therapy Assistant        Therapy Charges for Today     Code Description Service Date Service Provider Modifiers Qty    26224338155 HC PT THER PROC EA 15 MIN 9/1/2019 Sarah Roman PTA GP 1    09044860924 HC PT THER SUPP EA 15 MIN 9/1/2019 Sarah Roman PTA GP 1          PT G-Codes  Outcome Measure Options:  -[facility] 6 Clicks Basic Mobility (PT)  AM-PAC 6 Clicks Score (PT): 15    Sarah Roman, PTA  9/1/2019

## 2019-09-01 NOTE — PLAN OF CARE
Problem: Patient Care Overview  Goal: Plan of Care Review  Outcome: Ongoing (interventions implemented as appropriate)   09/01/19 1522   Coping/Psychosocial   Plan of Care Reviewed With patient   OTHER   Outcome Summary pt fatigues quickly, unsteady gt; req assist of 2 for safety; plans SNU

## 2019-09-01 NOTE — PLAN OF CARE
Problem: Fall Risk (Adult)  Goal: Absence of Fall  Outcome: Ongoing (interventions implemented as appropriate)      Problem: Patient Care Overview  Goal: Plan of Care Review  Outcome: Ongoing (interventions implemented as appropriate)   09/01/19 9222   Coping/Psychosocial   Plan of Care Reviewed With patient   OTHER   Outcome Summary VSS except low HR at times (50s), c/o pain (meds given), chest Xray sched. for tomorrow      Goal: Individualization and Mutuality  Outcome: Ongoing (interventions implemented as appropriate)    Goal: Discharge Needs Assessment  Outcome: Ongoing (interventions implemented as appropriate)    Goal: Interprofessional Rounds/Family Conf  Outcome: Ongoing (interventions implemented as appropriate)      Problem: Pain, Chronic (Adult)  Goal: Acceptable Pain/Comfort Level and Functional Ability  Outcome: Ongoing (interventions implemented as appropriate)      Problem: Skin Injury Risk (Adult)  Goal: Skin Health and Integrity  Outcome: Ongoing (interventions implemented as appropriate)

## 2019-09-01 NOTE — PROGRESS NOTES
"DAILY PROGRESS NOTE  [facility]    Patient Identification:  Name: Trever Petit  Age: 63 y.o.  Sex: male  :  1955  MRN: [id number]         Primary Care Physician: System, Provider Not In      Subjective  No new c/o.     Objective:  General Appearance:  Comfortable, well-appearing, in no acute distress and not in pain.    Vital signs: (most recent): Blood pressure 131/81, pulse 52, temperature 98.4 °F (36.9 °C), temperature source Oral, resp. rate 18, height 172.7 cm (68\"), weight 71.9 kg (158 lb 8.2 oz), SpO2 95 %.    Lungs:  Normal effort and normal respiratory rate.  Breath sounds clear to auscultation.  (Ant.  Pt not willing to sit up for exam. )  Heart: Normal rate.  Regular rhythm.    Extremities: There is no dependent edema.    Neurological: Patient is alert.  (Oriented to person and place.  Speech slow.).    Skin:  Warm and dry.                Vital signs in last 24 hours:  Temp:  [97.7 °F (36.5 °C)-98.7 °F (37.1 °C)] 98.4 °F (36.9 °C)  Heart Rate:  [48-56] 52  Resp:  [18-20] 18  BP: (131-164)/() 131/81    Intake/Output:    Intake/Output Summary (Last 24 hours) at 2019 1423  Last data filed at 2019 0834  Gross per 24 hour   Intake 240 ml   Output 100 ml   Net 140 ml         Results from last 7 days   Lab Units 19  0610 19  0643 19  0408 19  0503 19  0444 19  0444   WBC 10*3/mm3 11.22* 12.98* 16.98* 18.30* 17.84* 16.84*   HEMOGLOBIN g/dL 11.1* 11.2* 11.5* 11.2* 11.0* 10.6*   PLATELETS 10*3/mm3 394 380 350 324 298 312     Results from last 7 days   Lab Units 19  0610 19  0643 19  0443 19  0408 19  1308   SODIUM mmol/L 132* 135*  --  133* 135*   POTASSIUM mmol/L 4.2 4.3 4.3 4.1 4.0   CHLORIDE mmol/L 97* 96*  --  96* 99   CO2 mmol/L 24.7 27.8  --  28.0 26.1   BUN mg/dL   --  29* 24*   CREATININE mg/dL 1.09 1.04  --  1.10 1.04   GLUCOSE mg/dL 129* 134* 99 126* 227*   Estimated Creatinine Clearance: " 70.5 mL/min (by C-G formula based on SCr of 1.09 mg/dL).  Results from last 7 days   Lab Units 09/01/19  0610 08/31/19  0643 08/30/19  0443 08/29/19  0408 08/28/19  1308   CALCIUM mg/dL 9.1 9.1  --  8.5* 8.4*   ALBUMIN g/dL  --  3.70  --  3.10* 3.10*   MAGNESIUM mg/dL  --   --  2.0  --   --      Results from last 7 days   Lab Units 08/31/19  0643 08/29/19  0408 08/28/19  1308   ALBUMIN g/dL 3.70 3.10* 3.10*   BILIRUBIN mg/dL 0.5 0.6 0.5   ALK PHOS U/L 51 38* 38*   AST (SGOT) U/L 18 21 26   ALT (SGPT) U/L 22 26 29       Assessment:    Closed fracture of multiple ribs of left side:  TS input appreciated.     Pulmonary infiltrate - atelectasis +/- pneumonia:  Pt on PO Augmentin. Complete course.     Pleural effusion - stable.     Hypoglycemia - resolved.     Type 2 diabetes mellitus, without long-term current use of insulin    Constipation    Leukocytosis    Hypertensive emergency - resolved.     Essential hypertension    Vascular dementia      Plan:  Please see above.  Repeat CXR in AM.  D/C when stable from TS point of view.     Mateusz Balderrama MD  9/1/2019  2:23 PM

## 2019-09-01 NOTE — NURSING NOTE
Spoke with Dr. Balderrama, aware allergy list states allergic to lisinopril and lispro insulin but tolerating both while in hospital. Aware pt poor historian. States to remove those meds from allergy list r/t not true allergy.

## 2019-09-01 NOTE — PROGRESS NOTES
Thoracic surgery progress note  Chief complaint left-sided rib pain  Afebrile vital signs stable.  Left-sided rib clicking due to fractures.  Some tenderness to palpation in the area.  Some splinting on the left.  Thoracentesis evaluation performed yesterday.  No fluid available for removal.  CT scan reviewed from August 29.  Patient has multiple fractures and some component of probable hemothorax.  Yesterday's chest x-ray shows stable partial opacification of the left hemithorax.  I have personally examined the patient CT scan and chest x-ray and reviewed the radiology reports.  Laboratory reviewed creatinine 1.1 hemoglobin 11.1  I have discussed his case with Dr. Bruce.  Patient has underlying dementia.  Communication is somewhat limited.  Review of systems no head neck or abdominal pain.    Impression #1 multiple rib fractures #2 probable associated hemothorax #3 underlying dementia #4 radiographic evidence of associated atelectasis  Recommend obtaining a follow-up chest x-ray tomorrow.  Patient may progress to have an enlarging pleural effusion over time

## 2019-09-01 NOTE — PLAN OF CARE
Problem: Fall Risk (Adult)  Goal: Absence of Fall  Outcome: Ongoing (interventions implemented as appropriate)      Problem: Patient Care Overview  Goal: Plan of Care Review  Outcome: Unable to achieve outcome(s) by discharge Date Met: 09/01/19 09/01/19 0423   Coping/Psychosocial   Plan of Care Reviewed With patient   OTHER   Outcome Summary VSS. PRN pain medications given- pt c/o severe rib pain. Miralax and docusate sodium held d/t multiple loose BMs. Continue to monitor.   Plan of Care Review   Progress no change     Goal: Discharge Needs Assessment  Outcome: Ongoing (interventions implemented as appropriate)    Goal: Interprofessional Rounds/Family Conf  Outcome: Ongoing (interventions implemented as appropriate)      Problem: Pain, Chronic (Adult)  Goal: Acceptable Pain/Comfort Level and Functional Ability  Outcome: Ongoing (interventions implemented as appropriate)      Problem: Skin Injury Risk (Adult)  Goal: Skin Health and Integrity  Outcome: Ongoing (interventions implemented as appropriate)

## 2019-09-02 ENCOUNTER — APPOINTMENT (OUTPATIENT)
Dept: GENERAL RADIOLOGY | Facility: HOSPITAL | Age: 64
End: 2019-09-02

## 2019-09-02 LAB
BACTERIA SPEC AEROBE CULT: NORMAL
BACTERIA SPEC AEROBE CULT: NORMAL
GLUCOSE BLDC GLUCOMTR-MCNC: 119 MG/DL (ref 70–130)
GLUCOSE BLDC GLUCOMTR-MCNC: 120 MG/DL (ref 70–130)
GLUCOSE BLDC GLUCOMTR-MCNC: 159 MG/DL (ref 70–130)
GLUCOSE BLDC GLUCOMTR-MCNC: 168 MG/DL (ref 70–130)

## 2019-09-02 PROCEDURE — 99232 SBSQ HOSP IP/OBS MODERATE 35: CPT | Performed by: THORACIC SURGERY (CARDIOTHORACIC VASCULAR SURGERY)

## 2019-09-02 PROCEDURE — 97110 THERAPEUTIC EXERCISES: CPT

## 2019-09-02 PROCEDURE — 71045 X-RAY EXAM CHEST 1 VIEW: CPT

## 2019-09-02 PROCEDURE — 82962 GLUCOSE BLOOD TEST: CPT

## 2019-09-02 PROCEDURE — 63710000001 INSULIN LISPRO (HUMAN) PER 5 UNITS: Performed by: NURSE PRACTITIONER

## 2019-09-02 PROCEDURE — 25010000002 ENOXAPARIN PER 10 MG: Performed by: NURSE PRACTITIONER

## 2019-09-02 RX ORDER — LIDOCAINE 50 MG/G
2 PATCH TOPICAL
Status: ON HOLD
Start: 2019-09-03 | End: 2019-09-25

## 2019-09-02 RX ORDER — PSEUDOEPHEDRINE HCL 30 MG
100 TABLET ORAL 2 TIMES DAILY
Start: 2019-09-02 | End: 2020-11-20 | Stop reason: HOSPADM

## 2019-09-02 RX ORDER — AMOXICILLIN AND CLAVULANATE POTASSIUM 875; 125 MG/1; MG/1
1 TABLET, FILM COATED ORAL EVERY 12 HOURS SCHEDULED
Qty: 12 TABLET | Refills: 0
Start: 2019-09-02 | End: 2019-09-08

## 2019-09-02 RX ORDER — HYDROCODONE BITARTRATE AND ACETAMINOPHEN 7.5; 325 MG/1; MG/1
1 TABLET ORAL EVERY 6 HOURS PRN
Qty: 12 TABLET | Refills: 0 | Status: SHIPPED | OUTPATIENT
Start: 2019-09-02 | End: 2019-09-06 | Stop reason: SDUPTHER

## 2019-09-02 RX ADMIN — LISINOPRIL 40 MG: 40 TABLET ORAL at 09:52

## 2019-09-02 RX ADMIN — ENOXAPARIN SODIUM 40 MG: 40 INJECTION SUBCUTANEOUS at 17:15

## 2019-09-02 RX ADMIN — HYDRALAZINE HYDROCHLORIDE 50 MG: 50 TABLET, FILM COATED ORAL at 09:52

## 2019-09-02 RX ADMIN — LIDOCAINE 2 PATCH: 50 PATCH TOPICAL at 09:53

## 2019-09-02 RX ADMIN — AMOXICILLIN AND CLAVULANATE POTASSIUM 1 TABLET: 875; 125 TABLET, FILM COATED ORAL at 09:52

## 2019-09-02 RX ADMIN — AMOXICILLIN AND CLAVULANATE POTASSIUM 1 TABLET: 875; 125 TABLET, FILM COATED ORAL at 20:48

## 2019-09-02 RX ADMIN — AMLODIPINE BESYLATE 10 MG: 10 TABLET ORAL at 09:53

## 2019-09-02 RX ADMIN — ASPIRIN 81 MG: 81 TABLET, COATED ORAL at 09:53

## 2019-09-02 RX ADMIN — DOCUSATE SODIUM 100 MG: 100 CAPSULE, LIQUID FILLED ORAL at 20:48

## 2019-09-02 RX ADMIN — HYDROCODONE BITARTRATE AND ACETAMINOPHEN 1 TABLET: 7.5; 325 TABLET ORAL at 17:15

## 2019-09-02 RX ADMIN — SODIUM CHLORIDE, PRESERVATIVE FREE 3 ML: 5 INJECTION INTRAVENOUS at 20:49

## 2019-09-02 RX ADMIN — ATORVASTATIN CALCIUM 20 MG: 20 TABLET, FILM COATED ORAL at 09:53

## 2019-09-02 RX ADMIN — SODIUM CHLORIDE, PRESERVATIVE FREE 3 ML: 5 INJECTION INTRAVENOUS at 09:53

## 2019-09-02 RX ADMIN — INSULIN LISPRO 2 UNITS: 100 INJECTION, SOLUTION INTRAVENOUS; SUBCUTANEOUS at 12:11

## 2019-09-02 RX ADMIN — INSULIN LISPRO 2 UNITS: 100 INJECTION, SOLUTION INTRAVENOUS; SUBCUTANEOUS at 20:47

## 2019-09-02 RX ADMIN — HYDROCODONE BITARTRATE AND ACETAMINOPHEN 1 TABLET: 7.5; 325 TABLET ORAL at 11:04

## 2019-09-02 RX ADMIN — POLYETHYLENE GLYCOL 3350 17 G: 17 POWDER, FOR SOLUTION ORAL at 20:47

## 2019-09-02 RX ADMIN — HYDROCODONE BITARTRATE AND ACETAMINOPHEN 1 TABLET: 7.5; 325 TABLET ORAL at 05:07

## 2019-09-02 NOTE — PROGRESS NOTES
"    Chief Complaint: Left rib fractures and pleural effusion follow-up    Subjective:  Symptoms:  Stable.  He reports malaise and chest pain.  No shortness of breath.    Diet:  Adequate intake.    Activity level: Impaired due to weakness.    Pain:  He complains of pain that is mild.  Pain is well controlled.        Vital Signs:  Temp:  [98 °F (36.7 °C)-98.7 °F (37.1 °C)] 98 °F (36.7 °C)  Heart Rate:  [47-54] 52  Resp:  [18] 18  BP: (125-143)/(81-87) 128/85    Intake & Output (last day)       09/01 0701 - 09/02 0700 09/02 0701 - 09/03 0700    P.O. 480     Total Intake(mL/kg) 480 (6.7)     Urine (mL/kg/hr) 950 (0.6)     Stool      Total Output 950     Net -470           Urine Unmeasured Occurrence 7 x           Objective:  General Appearance:  Comfortable and in no acute distress.    Vital signs: (most recent): Blood pressure 128/85, pulse 52, temperature 98 °F (36.7 °C), temperature source Oral, resp. rate 18, height 172.7 cm (68\"), weight 71.9 kg (158 lb 8.2 oz), SpO2 93 %.  No fever.    Output: Producing urine.    Lungs:  Normal effort and normal respiratory rate.  There are decreased breath sounds.    Heart: Normal rate.  Regular rhythm.  No murmur.   Abdomen: Abdomen is soft.  Bowel sounds are normal.   There is no abdominal tenderness.   There is no mass.   Extremities: There is no dependent edema.    Neurological: Patient is alert.  Normal strength.              Results Review:     I reviewed the patient's new clinical results.  I reviewed the patient's new imaging results and agree with the interpretation.    Imaging Results (last 24 hours)     Procedure Component Value Units Date/Time    XR Chest 1 View [[id number]] Collected:  09/02/19 0736     Updated:  09/02/19 0756    Narrative:       ONE VIEW PORTABLE CHEST AT 5:35 AM     HISTORY: Multiple left rib fractures. Pleural effusion.     FINDINGS: Again noted are multiple left rib fractures associated with  atelectasis and pleural fluid at the left base showing " no change from 2  days ago. There is no pneumothorax. The heart remains top normal in  size.     This report was finalized on 9/2/2019 7:53 AM by Dr. Perry Orozco M.D.             Lab Results:     Lab Results (last 24 hours)     Procedure Component Value Units Date/Time    POC Glucose Once [[id number]]  (Normal) Collected:  09/02/19 0600    Specimen:  Blood Updated:  09/02/19 0601     Glucose 120 mg/dL     POC Glucose Once [[id number]]  (Abnormal) Collected:  09/01/19 2026    Specimen:  Blood Updated:  09/01/19 2027     Glucose 168 mg/dL     POC Glucose Once [[id number]]  (Normal) Collected:  09/01/19 1648    Specimen:  Blood Updated:  09/01/19 1654     Glucose 88 mg/dL     Blood Culture - Blood, Arm, Left [[id number]] Collected:  08/28/19 1308    Specimen:  Blood from Arm, Left Updated:  09/01/19 1330     Blood Culture No growth at 4 days    Blood Culture - Blood, Hand, Right [[id number]] Collected:  08/28/19 1313    Specimen:  Blood from Hand, Right Updated:  09/01/19 1330     Blood Culture No growth at 4 days    POC Glucose Once [[id number]]  (Abnormal) Collected:  09/01/19 1132    Specimen:  Blood Updated:  09/01/19 1137     Glucose 192 mg/dL            Assessment/Plan       Hypertensive emergency    Essential hypertension    Hypoglycemia    Vascular dementia    Type 2 diabetes mellitus, without long-term current use of insulin (CMS/[facility])    Constipation    Leukocytosis    Fever    Closed fracture of multiple ribs of left side    Pleural effusion    Pneumonia       Assessment & Plan     Patient is stable and in no respiratory distress.  Chest wall pain is well controlled.  No need for intervention at this time.  Continue to increase activity and encourage good pulmonary hygiene.    Paulino Bruce III, MD  [facility]  09/02/19  10:56 AM

## 2019-09-02 NOTE — NURSING NOTE
Spoke with Dr. Bruce who states ok to d/c anytime from his standpoint. F/u in 6 weeks and states his office will set up tomorrow.

## 2019-09-02 NOTE — PLAN OF CARE
Problem: Fall Risk (Adult)  Goal: Absence of Fall  Outcome: Ongoing (interventions implemented as appropriate)      Problem: Patient Care Overview  Goal: Plan of Care Review  Outcome: Ongoing (interventions implemented as appropriate)   09/02/19 8902   OTHER   Outcome Summary vss.pt ambulated in [place] with PT.c/o left rib pain, prn pain med given.     Goal: Individualization and Mutuality  Outcome: Ongoing (interventions implemented as appropriate)    Goal: Discharge Needs Assessment  Outcome: Ongoing (interventions implemented as appropriate)    Goal: Interprofessional Rounds/Family Conf  Outcome: Ongoing (interventions implemented as appropriate)      Problem: Pain, Chronic (Adult)  Goal: Acceptable Pain/Comfort Level and Functional Ability  Outcome: Ongoing (interventions implemented as appropriate)      Problem: Skin Injury Risk (Adult)  Goal: Skin Health and Integrity  Outcome: Ongoing (interventions implemented as appropriate)

## 2019-09-02 NOTE — THERAPY TREATMENT NOTE
Patient Name: Trever Petit  : 1955    MRN: [id number]                              Today's Date: 2019       Admit Date: 2019    Visit Dx:     ICD-10-CM ICD-9-CM   1. Hypoglycemia E16.2 251.2   2. Closed fracture of multiple ribs of left side, initial encounter S22.42XA 807.09   3. Leukocytosis, unspecified type D72.829 288.60     Patient Active Problem List   Diagnosis   • Hypertensive emergency   • Essential hypertension   • Stroke (CMS/[facility])   • Hypoglycemia   • Vascular dementia   • Type 2 diabetes mellitus, without long-term current use of insulin (CMS/[facility])   • Constipation   • Leukocytosis   • Fever   • Closed fracture of multiple ribs of left side   • Pleural effusion   • Left lower lobe pneumonia (CMS/[facility])     Past Medical History:   Diagnosis Date   • Arthritis    • Carpal bone fracture    • Carpal tunnel syndrome    • Cerebrovascular accident (CMS/[facility])    • Constipation    • COPD (chronic obstructive pulmonary disease) (CMS/[facility])    • Coronary artery disease    • Diabetes mellitus (CMS/[facility])    • Elevated cholesterol    • Hypertension    • Myocardial infarction (CMS/[facility])    • Neuropathy    • Spinal stenosis    • Stroke (CMS/[facility])    • Type 2 diabetes mellitus, without long-term current use of insulin (CMS/[facility]) 2019   • Vascular dementia 2019     Past Surgical History:   Procedure Laterality Date   • CARDIAC CATHETERIZATION N/A 2019    Procedure: Left Heart Cath;  Surgeon: Chen Aguilar MD;  Location:  [facility];  Service: Cardiovascular   • CARDIAC CATHETERIZATION N/A 2019    Procedure: Left ventriculography;  Surgeon: Chen Aguilar MD;  Location:  [facility];  Service: Cardiovascular   • CARDIAC CATHETERIZATION N/A 2019    Procedure: Coronary angiography;  Surgeon: Chen Aguilar MD;  Location:  [facility];  Service: Cardiovascular   • CARPAL TUNNEL RELEASE     • CERVICAL FUSION     • NECK SURGERY        General Information     Row Name 09/02/19 1551          PT Evaluation Time/Intention    Document Type  therapy note (daily note)  -CS     Mode of Treatment  physical therapy  -     Row Name 09/02/19 1551          General Information    Patient Profile Reviewed?  yes  -CS     Existing Precautions/Restrictions  fall  -CS     Row Name 09/02/19 1551          Cognitive Assessment/Intervention- PT/OT    Orientation Status (Cognition)  oriented to;person;place  -     Row Name 09/02/19 1551          Safety Issues, Functional Mobility    Safety Issues Affecting Function (Mobility)  awareness of need for assistance;insight into deficits/self awareness  -CS       User Key  (r) = Recorded By, (t) = Taken By, (c) = Cosigned By    Initials Name Provider Type    CS Mike Youssef, PT Physical Therapist        Mobility     Row Name 09/02/19 1552          Bed Mobility Assessment/Treatment    Bed Mobility Assessment/Treatment  supine-sit;sit-supine  -CS     Wise Level (Bed Mobility)  minimum assist (75% patient effort)  -CS     Supine-Sit Wise (Bed Mobility)  minimum assist (75% patient effort)  -CS     Sit-Supine Wise (Bed Mobility)  minimum assist (75% patient effort)  -CS     Assistive Device (Bed Mobility)  bed rails;head of bed elevated  -     Row Name 09/02/19 1552          Sit-Stand Transfer    Sit-Stand Wise (Transfers)  minimum assist (75% patient effort)  -CS     Assistive Device (Sit-Stand Transfers)  walker, front-wheeled  -     Row Name 09/02/19 1552          Gait/Stairs Assessment/Training    Wise Level (Gait)  minimum assist (75% patient effort)  -CS     Assistive Device (Gait)  walker, front-wheeled  -CS     Distance in Feet (Gait)  75'x2, standing rest break needed to regain balance- posterior lean  -CS     Deviations/Abnormal Patterns (Gait)  fredi decreased;festinating/shuffling  -CS     Bilateral Gait Deviations  heel strike decreased;forward flexed posture   -CS       User Key  (r) = Recorded By, (t) = Taken By, (c) = Cosigned By    Initials Name Provider Type    Mike Abarca, PT Physical Therapist        Obj/Interventions    No documentation.       Goals/Plan    No documentation.       Clinical Impression     Row Name 09/02/19 1602          Pain Assessment    Additional Documentation  Pain Scale: FACES Pre/Post-Treatment (Group)  -CS     Row Name 09/02/19 1602          Pain Scale: Numbers Pre/Post-Treatment    Pain Intervention(s)  Repositioned  -CS     Row Name 09/02/19 1602          Pain Scale: FACES Pre/Post-Treatment    Pain: FACES Scale, Pretreatment  6-->hurts even more  -CS     Pain: FACES Scale, Post-Treatment  6-->hurts even more  -CS     Row Name 09/02/19 1602          Plan of Care Review    Plan of Care Reviewed With  patient  -CS     Row Name 09/02/19 1602          Vital Signs    O2 Delivery Pre Treatment  room air  -CS     O2 Delivery Intra Treatment  room air  -CS     O2 Delivery Post Treatment  room air  -CS     Row Name 09/02/19 1602          Positioning and Restraints    Pre-Treatment Position  in bed  -CS     Post Treatment Position  bed  -CS     In Bed  supine;call light within reach;encouraged to call for assist;exit alarm on  -CS       User Key  (r) = Recorded By, (t) = Taken By, (c) = Cosigned By    Initials Name Provider Type    Mike Abarca, PT Physical Therapist        Outcome Measures     Row Name 09/02/19 1607          How much help from another person do you currently need...    Turning from your back to your side while in flat bed without using bedrails?  3  -CS     Moving from lying on back to sitting on the side of a flat bed without bedrails?  2  -CS     Moving to and from a bed to a chair (including a wheelchair)?  3  -CS     Standing up from a chair using your arms (e.g., wheelchair, bedside chair)?  3  -CS     Climbing 3-5 steps with a railing?  1  -CS     To walk in hospital room?  3  -CS     AM-PAC 6 Clicks Score (PT)   15  -CS     Row Name 09/02/19 1607          Functional Assessment    Outcome Measure Options  AM-PAC 6 Clicks Basic Mobility (PT)  -CS       User Key  (r) = Recorded By, (t) = Taken By, (c) = Cosigned By    Initials Name Provider Type    Mike Abarca, PT Physical Therapist        Physical Therapy Education     Title: PT OT SLP Therapies (Done)     Topic: Physical Therapy (Done)     Point: Mobility training (Done)     Learning Progress Summary           Patient Acceptance, E,TB, VU,NR by MARYJANE at 9/2/2019  4:06 PM    Acceptance, E,TB,D, VU,NR by JIM at 9/1/2019  3:23 PM    Acceptance, E, NR by AR at 8/29/2019  3:42 PM    Acceptance, E,D, NR by MS at 8/28/2019 11:38 AM    Acceptance, E,D, NR by YURI at 8/27/2019 11:55 AM    Acceptance, E,TB, NR by YURI at 8/26/2019  3:28 PM                   Point: Home exercise program (Done)     Learning Progress Summary           Patient Acceptance, E,TB, VU,NR by MARYJANE at 9/2/2019  4:06 PM    Acceptance, E,TB,D, VU,NR by JIM at 9/1/2019  3:23 PM    Acceptance, E, NR by AR at 8/29/2019  3:42 PM    Acceptance, E,D, NR by MS at 8/28/2019 11:38 AM    Acceptance, E,D, NR by YURI at 8/27/2019 11:55 AM    Acceptance, E,TB, NR by YURI at 8/26/2019  3:28 PM                   Point: Body mechanics (Done)     Learning Progress Summary           Patient Acceptance, E,TB, VU,NR by MARYJANE at 9/2/2019  4:06 PM    Acceptance, E,TB,D, VU,NR by JIM at 9/1/2019  3:23 PM    Acceptance, E, NR by AR at 8/29/2019  3:42 PM    Acceptance, E,D, NR by MS at 8/28/2019 11:38 AM    Acceptance, E,D, NR by YURI at 8/27/2019 11:55 AM    Acceptance, E,TB, NR by YURI at 8/26/2019  3:28 PM                   Point: Precautions (Done)     Learning Progress Summary           Patient Acceptance, E,TB, VU,NR by MARYJANE at 9/2/2019  4:06 PM    Acceptance, E,TB,PORSHA, VU,NR by JIM at 9/1/2019  3:23 PM    Acceptance, GUILLERMINA, NR by AR at 8/29/2019  3:42 PM    Acceptance, PORSHA SARMIENTO, NR by MS at 8/28/2019 11:38 AM    Acceptance, PORSHA SARMIENTO, NR by YURI at 8/27/2019 11:55 AM     Acceptance, E,TB, NR by  at 8/26/2019  3:28 PM                               User Key     Initials Effective Dates Name Provider Type Discipline     02/05/19 -  Elizabeth Vazquez, PT Physical Therapist PT    JM 03/07/18 -  Sarah Roman, PTA Physical Therapy Assistant PT    MS 04/03/18 -  Vargas Tatum, PT Physical Therapist PT    AR 04/03/18 -  Piedad Crowder, PT Physical Therapist PT    CS 05/14/18 -  Mike Youssef, PT Physical Therapist PT              PT Recommendation and Plan     Outcome Summary/Treatment Plan (PT)  Anticipated Discharge Disposition (PT): skilled nursing facility  Plan of Care Reviewed With: patient  Outcome Summary: Pt walked further this visit, moderate complaints of left rib pain. Will continue to progress as tolerated.      Time Calculation:   PT Charges     Row Name 09/02/19 1607             Time Calculation    Start Time  1543  -CS      Stop Time  1555  -CS      Time Calculation (min)  12 min  -CS      PT Received On  09/02/19  -CS      PT - Next Appointment  09/03/19  -        User Key  (r) = Recorded By, (t) = Taken By, (c) = Cosigned By    Initials Name Provider Type    CS Mike Youssef, PT Physical Therapist        Therapy Charges for Today     Code Description Service Date Service Provider Modifiers Qty    14122032308 HC PT THER PROC EA 15 MIN 9/2/2019 Mike Youssef, PT GP 1          PT G-Codes  Outcome Measure Options: AM-PAC 6 Clicks Basic Mobility (PT)  AM-PAC 6 Clicks Score (PT): 15    Mike Youssef PT  9/2/2019

## 2019-09-02 NOTE — PLAN OF CARE
Problem: Patient Care Overview  Goal: Plan of Care Review  Outcome: Ongoing (interventions implemented as appropriate)   09/02/19 9130   Coping/Psychosocial   Plan of Care Reviewed With patient   OTHER   Outcome Summary Pt walked further this visit, moderate complaints of left rib pain. Will continue to progress as tolerated.

## 2019-09-02 NOTE — PLAN OF CARE
Problem: Fall Risk (Adult)  Goal: Absence of Fall  Outcome: Ongoing (interventions implemented as appropriate)      Problem: Patient Care Overview  Goal: Plan of Care Review  Outcome: Ongoing (interventions implemented as appropriate)   09/02/19 0426   Coping/Psychosocial   Plan of Care Reviewed With patient   OTHER   Outcome Summary pt is alert to self and place, room air, complaining of left rib pain, medicated PRN, no falls, bed alarm on, turn, continue to monitor   Plan of Care Review   Progress no change     Goal: Individualization and Mutuality  Outcome: Ongoing (interventions implemented as appropriate)    Goal: Discharge Needs Assessment  Outcome: Ongoing (interventions implemented as appropriate)      Problem: Pain, Chronic (Adult)  Goal: Acceptable Pain/Comfort Level and Functional Ability  Outcome: Ongoing (interventions implemented as appropriate)      Problem: Skin Injury Risk (Adult)  Goal: Skin Health and Integrity  Outcome: Ongoing (interventions implemented as appropriate)

## 2019-09-02 NOTE — DISCHARGE SUMMARY
Date of Admission: 8/25/2019  Date of Discharge:  9/2/2019  Primary Care Physician: System, Provider Not In     Discharge Diagnosis:  Active Hospital Problems    Diagnosis  POA   • Pleural effusion [J90]  Yes   • Pneumonia [J18.9]  Yes   • Closed fracture of multiple ribs of left side [S22.42XA]  Yes   • Constipation [K59.00]  Yes   • Leukocytosis [D72.829]  Yes   • Fever [R50.9]  Yes   • Hypoglycemia [E16.2]  Yes   • Vascular dementia [F01.50]  Yes   • Type 2 diabetes mellitus, without long-term current use of insulin (CMS/[facility]) [E11.9]  Yes   • Essential hypertension [I10]  Yes   • Hypertensive emergency [I16.1]  Yes      Resolved Hospital Problems   No resolved problems to display.       Presenting Problem/History of Present Illness:  Hypoglycemia [E16.2]  Closed fracture of multiple ribs of left side, initial encounter [S22.42XA]  Leukocytosis, unspecified type [D72.829]  Hypoglycemia [E16.2]     Hospital Course:  The patient is a 63 y.o. male with a history of diabetes, vascular dementia, hypertension and COPD who presented with confusion following a fall.  Please see admission H&P from 8/25/19 for further details.  He was found to be hypoglycemic and had left-sided 7-10th rib fractures.  He was started on pain control and thoracic surgery. followed the patient.  He did have a left-sided infiltrate and a small left pleural effusion which was of insufficient volume to perform a thoracentesis.  His CXR was followed serially and remained stable.  He was started on a course of augmentin for treatment of pneumonia which will be completed as a outpatient.  His pain was well-controlled on oral pain medication at the time of discharge, and he was not requiring supplemental oxygen.  His mental status returned to baseline. He will be discharged on his current pain control regimen and will follow up with Dr. Bruce in 6 weeks.  He will need to continue aggressive pulmonary toilet and progressively increase his activity  "levels.    Regarding his hypoglycemia, his actos and glipizide were held and he was covered with sliding scale insulin.  His hypoglycemia resolved.  His glipizide will be held at discharge and he will be restarted on his actos.  He should be monitored on sliding scale insulin at rehab for the time being but I suspect he can be weaned off of this.    Exam Today:  Blood pressure 131/74, pulse (!) 49, temperature 97.4 °F (36.3 °C), temperature source Oral, resp. rate 18, height 172.7 cm (68\"), weight 71.9 kg (158 lb 8.2 oz), SpO2 96 %.  General Appearance:  Comfortable, well-appearing, in no acute distress and not in pain.    HEENT: NCAT, MMM  Lungs:  Normal effort and normal respiratory rate.  Breath sounds clear to auscultation.  (Ant.  Pt not willing to sit up for exam. )  Heart: Normal rate.  Regular rhythm.    Abdomen: Soft, non-tender, non-distended.  Bowel sounds are normoactive.  Extremities: There is no dependent edema.    Neurological: Patient is alert. Oriented to person and place.  Skin:  Warm and dry.      Consults:   Consults     Date and Time Order Name Status Description    8/29/2019 0804 Inpatient Thoracic Surgery Consult Completed     8/25/2019 1029 [facility] (on-call MD unless specified) Details Completed            Discharge Disposition:  Skilled Nursing Facility (DC - External)    Discharge Medications:     Discharge Medications      New Medications      Instructions Start Date   amoxicillin-clavulanate 875-125 MG per tablet  Commonly known as:  AUGMENTIN   1 tablet, Oral, Every 12 Hours Scheduled      docusate sodium 100 MG capsule   100 mg, Oral, 2 Times Daily      HYDROcodone-acetaminophen 7.5-325 MG per tablet  Commonly known as:  NORCO   1 tablet, Oral, Every 6 Hours PRN      insulin lispro 100 UNIT/ML injection  Commonly known as:  humaLOG   0-7 Units, Subcutaneous, 4 Times Daily With Meals & Nightly      lidocaine 5 %  Commonly known as:  LIDODERM   2 patches, Transdermal, Every 24 Hours " Scheduled, Remove & Discard patch within 12 hours or as directed by MD   Start Date:  9/3/2019     polyethylene glycol pack packet  Commonly known as:  MIRALAX   17 g, Oral, 3 Times Daily         Continue These Medications      Instructions Start Date   amLODIPine 10 MG tablet  Commonly known as:  NORVASC   10 mg, Oral, Every 24 Hours Scheduled      aspirin 81 MG EC tablet   81 mg, Oral, Daily      atorvastatin 20 MG tablet  Commonly known as:  LIPITOR   20 mg, Oral, Daily      hydrALAZINE 50 MG tablet  Commonly known as:  APRESOLINE   50 mg, Oral, Every 12 Hours Scheduled      lisinopril 40 MG tablet  Commonly known as:  PRINIVIL,ZESTRIL   40 mg, Oral, Every 24 Hours Scheduled      metoprolol succinate XL 25 MG 24 hr tablet  Commonly known as:  TOPROL-XL   25 mg, Oral, Every 24 Hours Scheduled      pioglitazone 15 MG tablet  Commonly known as:  ACTOS   15 mg, Oral, Daily         Stop These Medications    glipiZIDE 5 MG tablet  Commonly known as:  GLUCOTROL            Discharge Diet:   Diet Instructions     Diet: Consistent Carbohydrate, Cardiac; Thin      Discharge Diet:   Consistent Carbohydrate  Cardiac       Fluid Consistency:  Thin          Activity at Discharge:   Activity Instructions     Activity as Tolerated            Follow-up Appointments:  Future Appointments   Date Time Provider Department Center   2/18/2020 11:15 AM Chen Aguilar MD [facility] None     Additional Instructions for the Follow-ups that You Need to Schedule     Discharge Follow-up with Specified Provider: Dr. Bruce (Thoracic Surgery); 6 Weeks   As directed      To:  Dr. Bruce (Thoracic Surgery)    Follow Up:  6 Weeks                Vargas Chaidez MD  09/02/19  1:41 PM    Time Spent on Discharge Activities: Greater than 30 minutes.    Addendum:  Patient was monitored overnight while awaiting a bed at rehab. No changes clinically.  Please see my progress note from today.    Vargas Chaidez MD  9/3/2019  1:31 PM

## 2019-09-03 VITALS
BODY MASS INDEX: 24.02 KG/M2 | WEIGHT: 158.51 LBS | HEIGHT: 68 IN | DIASTOLIC BLOOD PRESSURE: 68 MMHG | RESPIRATION RATE: 16 BRPM | TEMPERATURE: 99 F | SYSTOLIC BLOOD PRESSURE: 102 MMHG | HEART RATE: 51 BPM | OXYGEN SATURATION: 98 %

## 2019-09-03 LAB
GLUCOSE BLDC GLUCOMTR-MCNC: 134 MG/DL (ref 70–130)
GLUCOSE BLDC GLUCOMTR-MCNC: 179 MG/DL (ref 70–130)

## 2019-09-03 PROCEDURE — 63710000001 INSULIN LISPRO (HUMAN) PER 5 UNITS: Performed by: NURSE PRACTITIONER

## 2019-09-03 PROCEDURE — 82962 GLUCOSE BLOOD TEST: CPT

## 2019-09-03 PROCEDURE — 99231 SBSQ HOSP IP/OBS SF/LOW 25: CPT | Performed by: NURSE PRACTITIONER

## 2019-09-03 RX ADMIN — INSULIN LISPRO 2 UNITS: 100 INJECTION, SOLUTION INTRAVENOUS; SUBCUTANEOUS at 08:41

## 2019-09-03 RX ADMIN — HYDRALAZINE HYDROCHLORIDE 50 MG: 50 TABLET, FILM COATED ORAL at 08:41

## 2019-09-03 RX ADMIN — POLYETHYLENE GLYCOL 3350 17 G: 17 POWDER, FOR SOLUTION ORAL at 08:39

## 2019-09-03 RX ADMIN — DOCUSATE SODIUM 100 MG: 100 CAPSULE, LIQUID FILLED ORAL at 08:40

## 2019-09-03 RX ADMIN — SODIUM CHLORIDE, PRESERVATIVE FREE 3 ML: 5 INJECTION INTRAVENOUS at 08:43

## 2019-09-03 RX ADMIN — ASPIRIN 81 MG: 81 TABLET, COATED ORAL at 08:41

## 2019-09-03 RX ADMIN — HYDROCODONE BITARTRATE AND ACETAMINOPHEN 1 TABLET: 7.5; 325 TABLET ORAL at 11:59

## 2019-09-03 RX ADMIN — LISINOPRIL 40 MG: 40 TABLET ORAL at 08:40

## 2019-09-03 RX ADMIN — METOPROLOL SUCCINATE 25 MG: 25 TABLET, FILM COATED, EXTENDED RELEASE ORAL at 08:40

## 2019-09-03 RX ADMIN — ATORVASTATIN CALCIUM 20 MG: 20 TABLET, FILM COATED ORAL at 08:41

## 2019-09-03 RX ADMIN — AMLODIPINE BESYLATE 10 MG: 10 TABLET ORAL at 08:41

## 2019-09-03 RX ADMIN — AMOXICILLIN AND CLAVULANATE POTASSIUM 1 TABLET: 875; 125 TABLET, FILM COATED ORAL at 08:40

## 2019-09-03 RX ADMIN — HYDROCODONE BITARTRATE AND ACETAMINOPHEN 1 TABLET: 7.5; 325 TABLET ORAL at 04:37

## 2019-09-03 NOTE — PROGRESS NOTES
"Continued Stay Note  [facility]     Patient Name: Trever Petit  MRN: [id number]  Today's Date: 9/3/2019    Admit Date: 8/25/2019    Discharge Plan     Row Name 09/03/19 1240       Plan    Plan  [facility] for SNF    Patient/Family in Agreement with Plan  yes    Plan Comments  Nurse stated MD was going to discharge 9/2/2019 but they were told pt needed pre cert.  According to Bella this past Friday, Pt did not require presert.  Called Bella this morning inquiring about pre cert.  Bella stated \"I do not know why they told them he needed pre cert because he does not.\"  Stated she would check again to make sure but she was sure he did not.  Bella called and stated pt does not require pre cert and can come today.  Called Les Petit, brother, [phone] and he will transport pt to [facility].  Dr Chaidez notified that pt could discharge to [facility] today.  Randy Burrows RN-BC        Discharge Codes    No documentation.       Expected Discharge Date and Time     Expected Discharge Date Expected Discharge Time    Sep 2, 2019             Randy Burrows RN    "

## 2019-09-03 NOTE — PROGRESS NOTES
"    Chief Complaint: Left rib fractures and pleural effusion follow-up    Subjective:  Symptoms:  Stable.  He reports malaise, chest pain and weakness.  No shortness of breath.    Diet:  Adequate intake.    Activity level: Impaired due to weakness.    Pain:  He complains of pain that is mild.  Pain is well controlled.        Vital Signs:  Temp:  [97.4 °F (36.3 °C)-99 °F (37.2 °C)] 98.9 °F (37.2 °C)  Heart Rate:  [49-75] 75  Resp:  [16-18] 16  BP: (131-148)/(74-87) 131/87    Intake & Output (last day)       09/02 0701 - 09/03 0700 09/03 0701 - 09/04 0700    P.O.  300    Total Intake(mL/kg)  300 (4.2)    Urine (mL/kg/hr) 575 (0.3) 65 (0.2)    Total Output 575 65    Net -575 +235          Urine Unmeasured Occurrence 4 x           Objective:  General Appearance:  Comfortable, in no acute distress and ill-appearing.    Vital signs: (most recent): Blood pressure 131/87, pulse 75, temperature 98.9 °F (37.2 °C), temperature source Oral, resp. rate 16, height 172.7 cm (68\"), weight 71.9 kg (158 lb 8.2 oz), SpO2 96 %.  No fever.    Output: Producing urine.    Lungs:  Normal effort and normal respiratory rate.  There are decreased breath sounds.    Heart: Normal rate.  Regular rhythm.  No murmur.   Abdomen: Abdomen is soft.  Bowel sounds are normal.   There is no abdominal tenderness.   There is no mass.   Extremities: There is no dependent edema.    Neurological: Patient is alert.  Normal strength.          Results Review:     I reviewed the patient's new clinical results.  I reviewed the patient's new imaging results and agree with the interpretation.  I reviewed the patient's other test results and agree with the interpretation  Discussed with patient, RN and Dr. Bruce.    Imaging Results (last 24 hours)     ** No results found for the last 24 hours. **          Lab Results:     Lab Results (last 24 hours)     Procedure Component Value Units Date/Time    POC Glucose Once [[id number]]  (Abnormal) Collected:  09/03/19 0603    " Specimen:  Blood Updated:  09/03/19 0604     Glucose 179 mg/dL     POC Glucose Once [[id number]]  (Abnormal) Collected:  09/02/19 2007    Specimen:  Blood Updated:  09/02/19 2008     Glucose 168 mg/dL     POC Glucose Once [[id number]]  (Normal) Collected:  09/02/19 1631    Specimen:  Blood Updated:  09/02/19 1632     Glucose 119 mg/dL     Blood Culture - Blood, Arm, Left [[id number]] Collected:  08/28/19 1308    Specimen:  Blood from Arm, Left Updated:  09/02/19 1330     Blood Culture No growth at 5 days    Blood Culture - Blood, Hand, Right [[id number]] Collected:  08/28/19 1313    Specimen:  Blood from Hand, Right Updated:  09/02/19 1330     Blood Culture No growth at 5 days    POC Glucose Once [[id number]]  (Abnormal) Collected:  09/02/19 1125    Specimen:  Blood Updated:  09/02/19 1128     Glucose 159 mg/dL            Assessment/Plan       Vascular dementia    Closed fracture of multiple ribs of left side    Pleural effusion    Left lower lobe pneumonia (CMS/HCC)     Assessment & Plan     Mr. Petit is stable. No respiratory distress and pain is well managed. No surgical intervention at this time. Ok to discharge at any time from our standpoint.   We will follow-up with a CT chest without contrast and an appointment in our office in 6 weeks with Dr. Bruce.   Continue to increase activity and encourage good pulmonary hygiene.    JOCELINE Sung  [facility]  09/03/19  11:09 AM

## 2019-09-03 NOTE — PROGRESS NOTES
"DAILY PROGRESS NOTE  [facility]    Patient Identification:  Name: Trever Petit  Age: 63 y.o.  Sex: male  :  1955  MRN: [id number]         Primary Care Physician: System, Provider Not In      Subjective  CC: dyspnea, chest wall pain  No acute events.  Patient overall feels about the same.  Pain is controlled.  Taking PO.  No f/c/n/v/d.    Objective:  General Appearance:  Comfortable, well-appearing, in no acute distress and not in pain.    Vital signs: (most recent): Blood pressure 131/87, pulse 75, temperature 98.9 °F (37.2 °C), temperature source Oral, resp. rate 16, height 172.7 cm (68\"), weight 71.9 kg (158 lb 8.2 oz), SpO2 96 %.    Lungs:  Normal effort and normal respiratory rate.  Breath sounds clear to auscultation.  (Ant.  Pt not willing to sit up for exam. )  Heart: Normal rate.  Regular rhythm.    Extremities: There is no dependent edema.    Neurological: Patient is alert.  (Oriented to person and place.  Speech slow.).    Skin:  Warm and dry.                Vital signs in last 24 hours:  Temp:  [97.8 °F (36.6 °C)-99 °F (37.2 °C)] 98.9 °F (37.2 °C)  Heart Rate:  [54-75] 75  Resp:  [16-18] 16  BP: (131-148)/(74-87) 131/87    Intake/Output:    Intake/Output Summary (Last 24 hours) at 9/3/2019 1331  Last data filed at 9/3/2019 0900  Gross per 24 hour   Intake 300 ml   Output 265 ml   Net 35 ml         Results from last 7 days   Lab Units 19  0610 19  0643 19  0408 19  0503   WBC 10*3/mm3 11.22* 12.98* 16.98* 18.30*   HEMOGLOBIN g/dL 11.1* 11.2* 11.5* 11.2*   PLATELETS 10*3/mm3 394 380 350 324     Results from last 7 days   Lab Units 19  0610 19  0643 19  0443 19  0408 08/28/19  1308   SODIUM mmol/L 132* 135*  --  133* 135*   POTASSIUM mmol/L 4.2 4.3 4.3 4.1 4.0   CHLORIDE mmol/L 97* 96*  --  96* 99   CO2 mmol/L 24.7 27.8  --  28.0 26.1   BUN mg/dL 22 23  --  29* 24*   CREATININE mg/dL 1.09 1.04  --  1.10 1.04   GLUCOSE mg/dL 129* " 134* 99 126* 227*   Estimated Creatinine Clearance: 70.5 mL/min (by C-G formula based on SCr of 1.09 mg/dL).  Results from last 7 days   Lab Units 09/01/19  0610 08/31/19  0643 08/30/19  0443 08/29/19  0408 08/28/19  1308   CALCIUM mg/dL 9.1 9.1  --  8.5* 8.4*   ALBUMIN g/dL  --  3.70  --  3.10* 3.10*   MAGNESIUM mg/dL  --   --  2.0  --   --      Results from last 7 days   Lab Units 08/31/19  0643 08/29/19  0408 08/28/19  1308   ALBUMIN g/dL 3.70 3.10* 3.10*   BILIRUBIN mg/dL 0.5 0.6 0.5   ALK PHOS U/L 51 38* 38*   AST (SGOT) U/L 18 21 26   ALT (SGPT) U/L 22 26 29       Assessment:    Closed fracture of multiple ribs of left side:  TS input appreciated.     Pulmonary infiltrate - atelectasis +/- pneumonia:  Finish course of PO Augmentin.     Pleural effusion - stable.     Hypoglycemia - resolved. Glipizide stopped, resume actos at discharge.    Type 2 diabetes mellitus, without long-term current use of insulin    Constipation    Leukocytosis    Hypertensive emergency - resolved.     Essential hypertension    Vascular dementia      Plan:  Rehab today.  F/u with Dr. Bruce in 6 weeks.    Vargas Chaidez MD  9/3/2019  1:31 PM

## 2019-09-03 NOTE — PLAN OF CARE
Problem: Fall Risk (Adult)  Goal: Absence of Fall  Outcome: Ongoing (interventions implemented as appropriate)      Problem: Patient Care Overview  Goal: Plan of Care Review   09/03/19 9398   Coping/Psychosocial   Plan of Care Reviewed With patient   OTHER   Outcome Summary VSS, c/o left rib pain 1x relieved by PRN, trying to get out of bed all night, bed alarm zone 2, will continue to monitor   Plan of Care Review   Progress no change     Goal: Individualization and Mutuality  Outcome: Ongoing (interventions implemented as appropriate)    Goal: Discharge Needs Assessment  Outcome: Ongoing (interventions implemented as appropriate)    Goal: Interprofessional Rounds/Family Conf  Outcome: Ongoing (interventions implemented as appropriate)      Problem: Pain, Chronic (Adult)  Goal: Acceptable Pain/Comfort Level and Functional Ability  Outcome: Ongoing (interventions implemented as appropriate)      Problem: Skin Injury Risk (Adult)  Goal: Skin Health and Integrity  Outcome: Ongoing (interventions implemented as appropriate)

## 2019-09-03 NOTE — PLAN OF CARE
Problem: Patient Care Overview  Goal: Plan of Care Review  Outcome: Outcome(s) achieved Date Met: 09/03/19 09/03/19 2174   Coping/Psychosocial   Plan of Care Reviewed With patient   OTHER   Outcome Summary Vitals stable, Pain controlled with prn meds. pt discharged to rehab.    Plan of Care Review   Progress improving

## 2019-09-04 NOTE — PROGRESS NOTES
Case Management Discharge Note    Final Note: Discharged to [facility] for SNF.  Transported by brother in private auto.      Destination - Selection Complete      Service Provider Request Status Selected Services Address Phone Number Fax Number    [facility] Selected Skilled Nursing [address] [facility] [phone]       Randy Burrows RN 8/27/2019 1144    Called [facility] with [place]                 Durable Medical Equipment      No service has been selected for the patient.      Dialysis/Infusion      No service has been selected for the patient.      Home Medical Care      No service has been selected for the patient.      Therapy      No service has been selected for the patient.      Community Resources      No service has been selected for the patient.        Transportation Services  Private: Car    Final Discharge Disposition Code: 03 - skilled nursing facility (SNF)

## 2019-09-10 RX ORDER — HYDROCODONE BITARTRATE AND ACETAMINOPHEN 7.5; 325 MG/1; MG/1
1 TABLET ORAL EVERY 6 HOURS PRN
Qty: 240 TABLET | Refills: 0 | Status: ON HOLD | OUTPATIENT
Start: 2019-09-10 | End: 2019-09-27 | Stop reason: SDUPTHER

## 2019-09-10 RX ORDER — HYDROCODONE BITARTRATE AND ACETAMINOPHEN 7.5; 325 MG/1; MG/1
1 TABLET ORAL EVERY 6 HOURS PRN
Qty: 12 TABLET | Refills: 0 | Status: SHIPPED | OUTPATIENT
Start: 2019-09-10 | End: 2019-09-10 | Stop reason: SDUPTHER

## 2019-09-11 ENCOUNTER — OUTSIDE FACILITY SERVICE (OUTPATIENT)
Dept: INTERNAL MEDICINE | Facility: CLINIC | Age: 64
End: 2019-09-11

## 2019-09-11 PROCEDURE — 99305 1ST NF CARE MODERATE MDM 35: CPT | Performed by: FAMILY MEDICINE

## 2019-09-24 ENCOUNTER — HOSPITAL ENCOUNTER (INPATIENT)
Facility: HOSPITAL | Age: 64
LOS: 4 days | Discharge: NURSING, REHAB OR LONG-TERM CARE | End: 2019-09-28
Attending: EMERGENCY MEDICINE | Admitting: HOSPITALIST

## 2019-09-24 DIAGNOSIS — N17.9 AKI (ACUTE KIDNEY INJURY) (HCC): ICD-10-CM

## 2019-09-24 DIAGNOSIS — R56.9 SEIZURE (HCC): ICD-10-CM

## 2019-09-24 DIAGNOSIS — N30.90 CYSTITIS: Primary | ICD-10-CM

## 2019-09-24 LAB
BASOPHILS # BLD AUTO: 0.03 10*3/MM3 (ref 0–0.2)
BASOPHILS NFR BLD AUTO: 0.3 % (ref 0–1.5)
DEPRECATED RDW RBC AUTO: 42 FL (ref 37–54)
EOSINOPHIL # BLD AUTO: 0.13 10*3/MM3 (ref 0–0.4)
EOSINOPHIL NFR BLD AUTO: 1.3 % (ref 0.3–6.2)
ERYTHROCYTE [DISTWIDTH] IN BLOOD BY AUTOMATED COUNT: 12.3 % (ref 12.3–15.4)
HCT VFR BLD AUTO: 30.9 % (ref 37.5–51)
HGB BLD-MCNC: 10.3 G/DL (ref 13–17.7)
IMM GRANULOCYTES # BLD AUTO: 0.02 10*3/MM3 (ref 0–0.05)
IMM GRANULOCYTES NFR BLD AUTO: 0.2 % (ref 0–0.5)
LYMPHOCYTES # BLD AUTO: 1.93 10*3/MM3 (ref 0.7–3.1)
LYMPHOCYTES NFR BLD AUTO: 19.8 % (ref 19.6–45.3)
MCH RBC QN AUTO: 31.1 PG (ref 26.6–33)
MCHC RBC AUTO-ENTMCNC: 33.3 G/DL (ref 31.5–35.7)
MCV RBC AUTO: 93.4 FL (ref 79–97)
MONOCYTES # BLD AUTO: 1.3 10*3/MM3 (ref 0.1–0.9)
MONOCYTES NFR BLD AUTO: 13.3 % (ref 5–12)
NEUTROPHILS # BLD AUTO: 6.34 10*3/MM3 (ref 1.7–7)
NEUTROPHILS NFR BLD AUTO: 65.1 % (ref 42.7–76)
NRBC BLD AUTO-RTO: 0 /100 WBC (ref 0–0.2)
PLATELET # BLD AUTO: 277 10*3/MM3 (ref 140–450)
PMV BLD AUTO: 9.5 FL (ref 6–12)
RBC # BLD AUTO: 3.31 10*6/MM3 (ref 4.14–5.8)
WBC NRBC COR # BLD: 9.75 10*3/MM3 (ref 3.4–10.8)

## 2019-09-24 PROCEDURE — 80053 COMPREHEN METABOLIC PANEL: CPT | Performed by: EMERGENCY MEDICINE

## 2019-09-24 PROCEDURE — 87088 URINE BACTERIA CULTURE: CPT | Performed by: NURSE PRACTITIONER

## 2019-09-24 PROCEDURE — 85025 COMPLETE CBC W/AUTO DIFF WBC: CPT | Performed by: EMERGENCY MEDICINE

## 2019-09-24 PROCEDURE — 87086 URINE CULTURE/COLONY COUNT: CPT | Performed by: NURSE PRACTITIONER

## 2019-09-24 PROCEDURE — 81001 URINALYSIS AUTO W/SCOPE: CPT | Performed by: EMERGENCY MEDICINE

## 2019-09-24 PROCEDURE — 93005 ELECTROCARDIOGRAM TRACING: CPT | Performed by: EMERGENCY MEDICINE

## 2019-09-24 PROCEDURE — 93010 ELECTROCARDIOGRAM REPORT: CPT | Performed by: INTERNAL MEDICINE

## 2019-09-24 PROCEDURE — 99284 EMERGENCY DEPT VISIT MOD MDM: CPT

## 2019-09-24 PROCEDURE — 87186 SC STD MICRODIL/AGAR DIL: CPT | Performed by: NURSE PRACTITIONER

## 2019-09-24 RX ORDER — SODIUM CHLORIDE 0.9 % (FLUSH) 0.9 %
10 SYRINGE (ML) INJECTION AS NEEDED
Status: DISCONTINUED | OUTPATIENT
Start: 2019-09-24 | End: 2019-09-28 | Stop reason: HOSPADM

## 2019-09-25 ENCOUNTER — APPOINTMENT (OUTPATIENT)
Dept: CT IMAGING | Facility: HOSPITAL | Age: 64
End: 2019-09-25

## 2019-09-25 PROBLEM — N17.9 AKI (ACUTE KIDNEY INJURY) (HCC): Status: ACTIVE | Noted: 2019-09-25

## 2019-09-25 PROBLEM — N30.90 CYSTITIS: Status: ACTIVE | Noted: 2019-09-25

## 2019-09-25 PROBLEM — R56.9 SEIZURE-LIKE ACTIVITY (HCC): Status: ACTIVE | Noted: 2019-09-25

## 2019-09-25 LAB
ALBUMIN SERPL-MCNC: 3.8 G/DL (ref 3.5–5.2)
ALBUMIN/GLOB SERPL: 1.7 G/DL
ALP SERPL-CCNC: 55 U/L (ref 39–117)
ALT SERPL W P-5'-P-CCNC: 11 U/L (ref 1–41)
ANION GAP SERPL CALCULATED.3IONS-SCNC: 10.7 MMOL/L (ref 5–15)
ANION GAP SERPL CALCULATED.3IONS-SCNC: 8.5 MMOL/L (ref 5–15)
AST SERPL-CCNC: 9 U/L (ref 1–40)
BACTERIA UR QL AUTO: ABNORMAL /HPF
BILIRUB SERPL-MCNC: 0.2 MG/DL (ref 0.2–1.2)
BILIRUB UR QL STRIP: NEGATIVE
BUN BLD-MCNC: 31 MG/DL (ref 8–23)
BUN BLD-MCNC: 38 MG/DL (ref 8–23)
BUN/CREAT SERPL: 27.3 (ref 7–25)
BUN/CREAT SERPL: 27.9 (ref 7–25)
CALCIUM SPEC-SCNC: 8.6 MG/DL (ref 8.6–10.5)
CALCIUM SPEC-SCNC: 8.8 MG/DL (ref 8.6–10.5)
CHLORIDE SERPL-SCNC: 97 MMOL/L (ref 98–107)
CHLORIDE SERPL-SCNC: 99 MMOL/L (ref 98–107)
CLARITY UR: CLEAR
CO2 SERPL-SCNC: 25.3 MMOL/L (ref 22–29)
CO2 SERPL-SCNC: 26.5 MMOL/L (ref 22–29)
COLOR UR: YELLOW
CREAT BLD-MCNC: 1.11 MG/DL (ref 0.76–1.27)
CREAT BLD-MCNC: 1.39 MG/DL (ref 0.76–1.27)
D-LACTATE SERPL-SCNC: 0.7 MMOL/L (ref 0.5–2)
DEPRECATED RDW RBC AUTO: 43.5 FL (ref 37–54)
ERYTHROCYTE [DISTWIDTH] IN BLOOD BY AUTOMATED COUNT: 12.4 % (ref 12.3–15.4)
GFR SERPL CREATININE-BSD FRML MDRD: 52 ML/MIN/1.73
GFR SERPL CREATININE-BSD FRML MDRD: 67 ML/MIN/1.73
GLOBULIN UR ELPH-MCNC: 2.3 GM/DL
GLUCOSE BLD-MCNC: 157 MG/DL (ref 65–99)
GLUCOSE BLD-MCNC: 181 MG/DL (ref 65–99)
GLUCOSE BLDC GLUCOMTR-MCNC: 118 MG/DL (ref 70–130)
GLUCOSE BLDC GLUCOMTR-MCNC: 126 MG/DL (ref 70–130)
GLUCOSE BLDC GLUCOMTR-MCNC: 195 MG/DL (ref 70–130)
GLUCOSE BLDC GLUCOMTR-MCNC: 222 MG/DL (ref 70–130)
GLUCOSE UR STRIP-MCNC: NEGATIVE MG/DL
HBA1C MFR BLD: 6.69 % (ref 4.8–5.6)
HCT VFR BLD AUTO: 29.3 % (ref 37.5–51)
HGB BLD-MCNC: 9.4 G/DL (ref 13–17.7)
HGB UR QL STRIP.AUTO: NEGATIVE
HYALINE CASTS UR QL AUTO: ABNORMAL /LPF
KETONES UR QL STRIP: NEGATIVE
LEUKOCYTE ESTERASE UR QL STRIP.AUTO: ABNORMAL
MCH RBC QN AUTO: 30.6 PG (ref 26.6–33)
MCHC RBC AUTO-ENTMCNC: 32.1 G/DL (ref 31.5–35.7)
MCV RBC AUTO: 95.4 FL (ref 79–97)
NITRITE UR QL STRIP: POSITIVE
PH UR STRIP.AUTO: <=5 [PH] (ref 5–8)
PLATELET # BLD AUTO: 265 10*3/MM3 (ref 140–450)
PMV BLD AUTO: 9.5 FL (ref 6–12)
POTASSIUM BLD-SCNC: 4 MMOL/L (ref 3.5–5.2)
POTASSIUM BLD-SCNC: 4.4 MMOL/L (ref 3.5–5.2)
PROT SERPL-MCNC: 6.1 G/DL (ref 6–8.5)
PROT UR QL STRIP: ABNORMAL
RBC # BLD AUTO: 3.07 10*6/MM3 (ref 4.14–5.8)
RBC # UR: ABNORMAL /HPF
REF LAB TEST METHOD: ABNORMAL
SODIUM BLD-SCNC: 132 MMOL/L (ref 136–145)
SODIUM BLD-SCNC: 135 MMOL/L (ref 136–145)
SP GR UR STRIP: 1.02 (ref 1–1.03)
SQUAMOUS #/AREA URNS HPF: ABNORMAL /HPF
UROBILINOGEN UR QL STRIP: ABNORMAL
WBC NRBC COR # BLD: 9.31 10*3/MM3 (ref 3.4–10.8)
WBC UR QL AUTO: ABNORMAL /HPF

## 2019-09-25 PROCEDURE — 63710000001 INSULIN LISPRO (HUMAN) PER 5 UNITS: Performed by: NURSE PRACTITIONER

## 2019-09-25 PROCEDURE — 97110 THERAPEUTIC EXERCISES: CPT

## 2019-09-25 PROCEDURE — 82962 GLUCOSE BLOOD TEST: CPT

## 2019-09-25 PROCEDURE — G0378 HOSPITAL OBSERVATION PER HR: HCPCS

## 2019-09-25 PROCEDURE — 80048 BASIC METABOLIC PNL TOTAL CA: CPT | Performed by: NURSE PRACTITIONER

## 2019-09-25 PROCEDURE — 25010000002 CEFTRIAXONE PER 250 MG: Performed by: EMERGENCY MEDICINE

## 2019-09-25 PROCEDURE — 83036 HEMOGLOBIN GLYCOSYLATED A1C: CPT | Performed by: NURSE PRACTITIONER

## 2019-09-25 PROCEDURE — 94799 UNLISTED PULMONARY SVC/PX: CPT

## 2019-09-25 PROCEDURE — 70450 CT HEAD/BRAIN W/O DYE: CPT

## 2019-09-25 PROCEDURE — 85027 COMPLETE CBC AUTOMATED: CPT | Performed by: NURSE PRACTITIONER

## 2019-09-25 PROCEDURE — 97162 PT EVAL MOD COMPLEX 30 MIN: CPT

## 2019-09-25 PROCEDURE — 83605 ASSAY OF LACTIC ACID: CPT | Performed by: EMERGENCY MEDICINE

## 2019-09-25 PROCEDURE — 87040 BLOOD CULTURE FOR BACTERIA: CPT | Performed by: EMERGENCY MEDICINE

## 2019-09-25 PROCEDURE — 99219 PR INITIAL OBSERVATION CARE/DAY 50 MINUTES: CPT | Performed by: PSYCHIATRY & NEUROLOGY

## 2019-09-25 RX ORDER — SODIUM CHLORIDE 9 MG/ML
75 INJECTION, SOLUTION INTRAVENOUS CONTINUOUS
Status: DISCONTINUED | OUTPATIENT
Start: 2019-09-25 | End: 2019-09-28 | Stop reason: HOSPADM

## 2019-09-25 RX ORDER — METOPROLOL SUCCINATE 25 MG/1
25 TABLET, EXTENDED RELEASE ORAL
Status: DISCONTINUED | OUTPATIENT
Start: 2019-09-25 | End: 2019-09-26

## 2019-09-25 RX ORDER — ACETAMINOPHEN 325 MG/1
650 TABLET ORAL EVERY 4 HOURS PRN
Status: DISCONTINUED | OUTPATIENT
Start: 2019-09-25 | End: 2019-09-28 | Stop reason: HOSPADM

## 2019-09-25 RX ORDER — AMLODIPINE BESYLATE 10 MG/1
10 TABLET ORAL
Status: DISCONTINUED | OUTPATIENT
Start: 2019-09-25 | End: 2019-09-28 | Stop reason: HOSPADM

## 2019-09-25 RX ORDER — SODIUM CHLORIDE 0.9 % (FLUSH) 0.9 %
10 SYRINGE (ML) INJECTION AS NEEDED
Status: DISCONTINUED | OUTPATIENT
Start: 2019-09-25 | End: 2019-09-28 | Stop reason: HOSPADM

## 2019-09-25 RX ORDER — ACETAMINOPHEN 160 MG/5ML
650 SOLUTION ORAL EVERY 4 HOURS PRN
Status: DISCONTINUED | OUTPATIENT
Start: 2019-09-25 | End: 2019-09-28 | Stop reason: HOSPADM

## 2019-09-25 RX ORDER — CEFTRIAXONE SODIUM 1 G/50ML
1 INJECTION, SOLUTION INTRAVENOUS EVERY 24 HOURS
Status: DISCONTINUED | OUTPATIENT
Start: 2019-09-26 | End: 2019-09-28 | Stop reason: HOSPADM

## 2019-09-25 RX ORDER — DOCUSATE SODIUM 100 MG/1
100 CAPSULE, LIQUID FILLED ORAL 2 TIMES DAILY
Status: DISCONTINUED | OUTPATIENT
Start: 2019-09-25 | End: 2019-09-28 | Stop reason: HOSPADM

## 2019-09-25 RX ORDER — LISINOPRIL 40 MG/1
40 TABLET ORAL
Status: DISCONTINUED | OUTPATIENT
Start: 2019-09-25 | End: 2019-09-28 | Stop reason: HOSPADM

## 2019-09-25 RX ORDER — HYDROCODONE BITARTRATE AND ACETAMINOPHEN 7.5; 325 MG/1; MG/1
1 TABLET ORAL EVERY 6 HOURS PRN
Status: DISCONTINUED | OUTPATIENT
Start: 2019-09-25 | End: 2019-09-28 | Stop reason: HOSPADM

## 2019-09-25 RX ORDER — ATORVASTATIN CALCIUM 20 MG/1
20 TABLET, FILM COATED ORAL DAILY
Status: DISCONTINUED | OUTPATIENT
Start: 2019-09-25 | End: 2019-09-28 | Stop reason: HOSPADM

## 2019-09-25 RX ORDER — HYDROXYZINE HYDROCHLORIDE 25 MG/1
25 TABLET, FILM COATED ORAL 2 TIMES DAILY PRN
Status: DISCONTINUED | OUTPATIENT
Start: 2019-09-25 | End: 2019-09-28 | Stop reason: HOSPADM

## 2019-09-25 RX ORDER — CEFTRIAXONE SODIUM 1 G/50ML
1 INJECTION, SOLUTION INTRAVENOUS ONCE
Status: COMPLETED | OUTPATIENT
Start: 2019-09-25 | End: 2019-09-25

## 2019-09-25 RX ORDER — DEXTROSE MONOHYDRATE 25 G/50ML
25 INJECTION, SOLUTION INTRAVENOUS
Status: DISCONTINUED | OUTPATIENT
Start: 2019-09-25 | End: 2019-09-28 | Stop reason: HOSPADM

## 2019-09-25 RX ORDER — HYDRALAZINE HYDROCHLORIDE 50 MG/1
50 TABLET, FILM COATED ORAL EVERY 12 HOURS SCHEDULED
Status: DISCONTINUED | OUTPATIENT
Start: 2019-09-25 | End: 2019-09-28 | Stop reason: HOSPADM

## 2019-09-25 RX ORDER — TAMSULOSIN HYDROCHLORIDE 0.4 MG/1
0.4 CAPSULE ORAL NIGHTLY
Status: DISCONTINUED | OUTPATIENT
Start: 2019-09-25 | End: 2019-09-28 | Stop reason: HOSPADM

## 2019-09-25 RX ORDER — NITROGLYCERIN 0.4 MG/1
0.4 TABLET SUBLINGUAL
Status: DISCONTINUED | OUTPATIENT
Start: 2019-09-25 | End: 2019-09-28 | Stop reason: HOSPADM

## 2019-09-25 RX ORDER — LEVETIRACETAM 500 MG/1
500 TABLET ORAL
Status: DISCONTINUED | OUTPATIENT
Start: 2019-09-25 | End: 2019-09-28 | Stop reason: HOSPADM

## 2019-09-25 RX ORDER — NICOTINE POLACRILEX 4 MG
15 LOZENGE BUCCAL
Status: DISCONTINUED | OUTPATIENT
Start: 2019-09-25 | End: 2019-09-28 | Stop reason: HOSPADM

## 2019-09-25 RX ORDER — SODIUM CHLORIDE 0.9 % (FLUSH) 0.9 %
10 SYRINGE (ML) INJECTION EVERY 12 HOURS SCHEDULED
Status: DISCONTINUED | OUTPATIENT
Start: 2019-09-25 | End: 2019-09-28 | Stop reason: HOSPADM

## 2019-09-25 RX ORDER — PIOGLITAZONEHYDROCHLORIDE 15 MG/1
15 TABLET ORAL DAILY
Status: DISCONTINUED | OUTPATIENT
Start: 2019-09-25 | End: 2019-09-28 | Stop reason: HOSPADM

## 2019-09-25 RX ORDER — SODIUM CHLORIDE, SODIUM LACTATE, POTASSIUM CHLORIDE, CALCIUM CHLORIDE 600; 310; 30; 20 MG/100ML; MG/100ML; MG/100ML; MG/100ML
100 INJECTION, SOLUTION INTRAVENOUS CONTINUOUS
Status: DISCONTINUED | OUTPATIENT
Start: 2019-09-25 | End: 2019-09-25

## 2019-09-25 RX ORDER — ESCITALOPRAM OXALATE 10 MG/1
10 TABLET ORAL DAILY
COMMUNITY
End: 2020-11-20 | Stop reason: HOSPADM

## 2019-09-25 RX ORDER — ONDANSETRON 2 MG/ML
4 INJECTION INTRAMUSCULAR; INTRAVENOUS EVERY 6 HOURS PRN
Status: DISCONTINUED | OUTPATIENT
Start: 2019-09-25 | End: 2019-09-28 | Stop reason: HOSPADM

## 2019-09-25 RX ORDER — HYDROXYZINE HYDROCHLORIDE 25 MG/1
25 TABLET, FILM COATED ORAL 2 TIMES DAILY PRN
COMMUNITY
End: 2020-05-27 | Stop reason: HOSPADM

## 2019-09-25 RX ORDER — TAMSULOSIN HYDROCHLORIDE 0.4 MG/1
1 CAPSULE ORAL NIGHTLY
COMMUNITY

## 2019-09-25 RX ORDER — HYDROCODONE BITARTRATE AND ACETAMINOPHEN 5; 325 MG/1; MG/1
1 TABLET ORAL ONCE
Status: COMPLETED | OUTPATIENT
Start: 2019-09-25 | End: 2019-09-25

## 2019-09-25 RX ORDER — ACETAMINOPHEN 650 MG/1
650 SUPPOSITORY RECTAL EVERY 4 HOURS PRN
Status: DISCONTINUED | OUTPATIENT
Start: 2019-09-25 | End: 2019-09-28 | Stop reason: HOSPADM

## 2019-09-25 RX ORDER — ESCITALOPRAM OXALATE 10 MG/1
10 TABLET ORAL DAILY
Status: DISCONTINUED | OUTPATIENT
Start: 2019-09-25 | End: 2019-09-28 | Stop reason: HOSPADM

## 2019-09-25 RX ORDER — ASPIRIN 81 MG/1
81 TABLET ORAL DAILY
Status: DISCONTINUED | OUTPATIENT
Start: 2019-09-25 | End: 2019-09-28 | Stop reason: HOSPADM

## 2019-09-25 RX ADMIN — ATORVASTATIN CALCIUM 20 MG: 20 TABLET, FILM COATED ORAL at 12:16

## 2019-09-25 RX ADMIN — PIOGLITAZONE 15 MG: 15 TABLET ORAL at 12:16

## 2019-09-25 RX ADMIN — POLYETHYLENE GLYCOL 3350 17 G: 17 POWDER, FOR SOLUTION ORAL at 20:22

## 2019-09-25 RX ADMIN — LISINOPRIL 40 MG: 40 TABLET ORAL at 12:16

## 2019-09-25 RX ADMIN — ASPIRIN 81 MG: 81 TABLET, COATED ORAL at 12:16

## 2019-09-25 RX ADMIN — SODIUM CHLORIDE 100 ML/HR: 9 INJECTION, SOLUTION INTRAVENOUS at 04:15

## 2019-09-25 RX ADMIN — SODIUM CHLORIDE, POTASSIUM CHLORIDE, SODIUM LACTATE AND CALCIUM CHLORIDE 1000 ML: 600; 310; 30; 20 INJECTION, SOLUTION INTRAVENOUS at 03:04

## 2019-09-25 RX ADMIN — HYDRALAZINE HYDROCHLORIDE 50 MG: 50 TABLET, FILM COATED ORAL at 12:16

## 2019-09-25 RX ADMIN — SODIUM CHLORIDE 75 ML/HR: 9 INJECTION, SOLUTION INTRAVENOUS at 20:22

## 2019-09-25 RX ADMIN — CEFTRIAXONE SODIUM 1 G: 1 INJECTION, SOLUTION INTRAVENOUS at 03:05

## 2019-09-25 RX ADMIN — POLYETHYLENE GLYCOL 3350 17 G: 17 POWDER, FOR SOLUTION ORAL at 12:15

## 2019-09-25 RX ADMIN — LEVETIRACETAM 500 MG: 500 TABLET, FILM COATED ORAL at 18:25

## 2019-09-25 RX ADMIN — Medication 10 ML: at 03:02

## 2019-09-25 RX ADMIN — AMLODIPINE BESYLATE 10 MG: 10 TABLET ORAL at 12:16

## 2019-09-25 RX ADMIN — INSULIN LISPRO 3 UNITS: 100 INJECTION, SOLUTION INTRAVENOUS; SUBCUTANEOUS at 07:48

## 2019-09-25 RX ADMIN — TAMSULOSIN HYDROCHLORIDE 0.4 MG: 0.4 CAPSULE ORAL at 20:22

## 2019-09-25 RX ADMIN — INSULIN LISPRO 2 UNITS: 100 INJECTION, SOLUTION INTRAVENOUS; SUBCUTANEOUS at 18:25

## 2019-09-25 RX ADMIN — DOCUSATE SODIUM 100 MG: 100 CAPSULE, LIQUID FILLED ORAL at 12:15

## 2019-09-25 RX ADMIN — DOCUSATE SODIUM 100 MG: 100 CAPSULE, LIQUID FILLED ORAL at 20:22

## 2019-09-25 RX ADMIN — SODIUM CHLORIDE, PRESERVATIVE FREE 10 ML: 5 INJECTION INTRAVENOUS at 12:22

## 2019-09-25 RX ADMIN — HYDROCODONE BITARTRATE AND ACETAMINOPHEN 1 TABLET: 5; 325 TABLET ORAL at 05:55

## 2019-09-25 RX ADMIN — ESCITALOPRAM 10 MG: 10 TABLET, FILM COATED ORAL at 12:16

## 2019-09-25 RX ADMIN — METOPROLOL SUCCINATE 25 MG: 25 TABLET, FILM COATED, EXTENDED RELEASE ORAL at 12:16

## 2019-09-25 RX ADMIN — HYDROCODONE BITARTRATE AND ACETAMINOPHEN 1 TABLET: 7.5; 325 TABLET ORAL at 18:25

## 2019-09-25 RX ADMIN — SODIUM CHLORIDE 100 ML/HR: 9 INJECTION, SOLUTION INTRAVENOUS at 12:17

## 2019-09-25 RX ADMIN — HYDRALAZINE HYDROCHLORIDE 50 MG: 50 TABLET, FILM COATED ORAL at 20:22

## 2019-09-25 RX ADMIN — SODIUM CHLORIDE, PRESERVATIVE FREE 10 ML: 5 INJECTION INTRAVENOUS at 20:23

## 2019-09-25 RX ADMIN — SODIUM CHLORIDE 75 ML/HR: 9 INJECTION, SOLUTION INTRAVENOUS at 18:26

## 2019-09-26 PROBLEM — B96.20 E. COLI UTI (URINARY TRACT INFECTION): Status: ACTIVE | Noted: 2019-09-26

## 2019-09-26 PROBLEM — N39.0 E. COLI UTI (URINARY TRACT INFECTION): Status: ACTIVE | Noted: 2019-09-26

## 2019-09-26 LAB
ANION GAP SERPL CALCULATED.3IONS-SCNC: 8.5 MMOL/L (ref 5–15)
BASOPHILS # BLD AUTO: 0.06 10*3/MM3 (ref 0–0.2)
BASOPHILS NFR BLD AUTO: 0.7 % (ref 0–1.5)
BUN BLD-MCNC: 22 MG/DL (ref 8–23)
BUN/CREAT SERPL: 18.3 (ref 7–25)
CALCIUM SPEC-SCNC: 8.6 MG/DL (ref 8.6–10.5)
CHLORIDE SERPL-SCNC: 105 MMOL/L (ref 98–107)
CO2 SERPL-SCNC: 25.5 MMOL/L (ref 22–29)
CREAT BLD-MCNC: 1.2 MG/DL (ref 0.76–1.27)
DEPRECATED RDW RBC AUTO: 42.8 FL (ref 37–54)
EOSINOPHIL # BLD AUTO: 0.17 10*3/MM3 (ref 0–0.4)
EOSINOPHIL NFR BLD AUTO: 2 % (ref 0.3–6.2)
ERYTHROCYTE [DISTWIDTH] IN BLOOD BY AUTOMATED COUNT: 12.5 % (ref 12.3–15.4)
GFR SERPL CREATININE-BSD FRML MDRD: 61 ML/MIN/1.73
GLUCOSE BLD-MCNC: 103 MG/DL (ref 65–99)
GLUCOSE BLDC GLUCOMTR-MCNC: 100 MG/DL (ref 70–130)
GLUCOSE BLDC GLUCOMTR-MCNC: 144 MG/DL (ref 70–130)
GLUCOSE BLDC GLUCOMTR-MCNC: 163 MG/DL (ref 70–130)
GLUCOSE BLDC GLUCOMTR-MCNC: 195 MG/DL (ref 70–130)
HCT VFR BLD AUTO: 29.3 % (ref 37.5–51)
HGB BLD-MCNC: 9.5 G/DL (ref 13–17.7)
IMM GRANULOCYTES # BLD AUTO: 0.03 10*3/MM3 (ref 0–0.05)
IMM GRANULOCYTES NFR BLD AUTO: 0.4 % (ref 0–0.5)
LYMPHOCYTES # BLD AUTO: 3.09 10*3/MM3 (ref 0.7–3.1)
LYMPHOCYTES NFR BLD AUTO: 37.2 % (ref 19.6–45.3)
MCH RBC QN AUTO: 30.2 PG (ref 26.6–33)
MCHC RBC AUTO-ENTMCNC: 32.4 G/DL (ref 31.5–35.7)
MCV RBC AUTO: 93 FL (ref 79–97)
MONOCYTES # BLD AUTO: 0.99 10*3/MM3 (ref 0.1–0.9)
MONOCYTES NFR BLD AUTO: 11.9 % (ref 5–12)
NEUTROPHILS # BLD AUTO: 3.97 10*3/MM3 (ref 1.7–7)
NEUTROPHILS NFR BLD AUTO: 47.8 % (ref 42.7–76)
NRBC BLD AUTO-RTO: 0 /100 WBC (ref 0–0.2)
PLATELET # BLD AUTO: 300 10*3/MM3 (ref 140–450)
PMV BLD AUTO: 9.4 FL (ref 6–12)
POTASSIUM BLD-SCNC: 4.5 MMOL/L (ref 3.5–5.2)
RBC # BLD AUTO: 3.15 10*6/MM3 (ref 4.14–5.8)
SODIUM BLD-SCNC: 139 MMOL/L (ref 136–145)
WBC NRBC COR # BLD: 8.31 10*3/MM3 (ref 3.4–10.8)

## 2019-09-26 PROCEDURE — G0378 HOSPITAL OBSERVATION PER HR: HCPCS

## 2019-09-26 PROCEDURE — 97110 THERAPEUTIC EXERCISES: CPT

## 2019-09-26 PROCEDURE — 97535 SELF CARE MNGMENT TRAINING: CPT

## 2019-09-26 PROCEDURE — 80048 BASIC METABOLIC PNL TOTAL CA: CPT | Performed by: NURSE PRACTITIONER

## 2019-09-26 PROCEDURE — 25010000002 CEFTRIAXONE PER 250 MG: Performed by: NURSE PRACTITIONER

## 2019-09-26 PROCEDURE — 85025 COMPLETE CBC W/AUTO DIFF WBC: CPT | Performed by: NURSE PRACTITIONER

## 2019-09-26 PROCEDURE — 63710000001 INSULIN LISPRO (HUMAN) PER 5 UNITS: Performed by: NURSE PRACTITIONER

## 2019-09-26 PROCEDURE — 97165 OT EVAL LOW COMPLEX 30 MIN: CPT

## 2019-09-26 PROCEDURE — 82962 GLUCOSE BLOOD TEST: CPT

## 2019-09-26 RX ORDER — METOPROLOL SUCCINATE 25 MG/1
12.5 TABLET, EXTENDED RELEASE ORAL
Status: DISCONTINUED | OUTPATIENT
Start: 2019-09-27 | End: 2019-09-28 | Stop reason: HOSPADM

## 2019-09-26 RX ADMIN — LEVETIRACETAM 500 MG: 500 TABLET, FILM COATED ORAL at 10:34

## 2019-09-26 RX ADMIN — ASPIRIN 81 MG: 81 TABLET, COATED ORAL at 10:34

## 2019-09-26 RX ADMIN — Medication 10 ML: at 21:00

## 2019-09-26 RX ADMIN — CEFTRIAXONE SODIUM 1 G: 1 INJECTION, SOLUTION INTRAVENOUS at 03:16

## 2019-09-26 RX ADMIN — HYDROCODONE BITARTRATE AND ACETAMINOPHEN 1 TABLET: 7.5; 325 TABLET ORAL at 17:06

## 2019-09-26 RX ADMIN — ESCITALOPRAM 10 MG: 10 TABLET, FILM COATED ORAL at 10:34

## 2019-09-26 RX ADMIN — SODIUM CHLORIDE, PRESERVATIVE FREE 10 ML: 5 INJECTION INTRAVENOUS at 10:36

## 2019-09-26 RX ADMIN — LISINOPRIL 40 MG: 40 TABLET ORAL at 10:34

## 2019-09-26 RX ADMIN — LEVETIRACETAM 500 MG: 500 TABLET, FILM COATED ORAL at 17:06

## 2019-09-26 RX ADMIN — SODIUM CHLORIDE, PRESERVATIVE FREE 10 ML: 5 INJECTION INTRAVENOUS at 21:00

## 2019-09-26 RX ADMIN — AMLODIPINE BESYLATE 10 MG: 10 TABLET ORAL at 10:34

## 2019-09-26 RX ADMIN — INSULIN LISPRO 2 UNITS: 100 INJECTION, SOLUTION INTRAVENOUS; SUBCUTANEOUS at 12:53

## 2019-09-26 RX ADMIN — HYDRALAZINE HYDROCHLORIDE 50 MG: 50 TABLET, FILM COATED ORAL at 10:34

## 2019-09-26 RX ADMIN — PIOGLITAZONE 15 MG: 15 TABLET ORAL at 10:34

## 2019-09-26 RX ADMIN — METOPROLOL SUCCINATE 12.5 MG: 25 TABLET, FILM COATED, EXTENDED RELEASE ORAL at 10:33

## 2019-09-26 RX ADMIN — ATORVASTATIN CALCIUM 20 MG: 20 TABLET, FILM COATED ORAL at 10:34

## 2019-09-26 RX ADMIN — HYDROCODONE BITARTRATE AND ACETAMINOPHEN 1 TABLET: 7.5; 325 TABLET ORAL at 10:33

## 2019-09-27 PROBLEM — N17.9 AKI (ACUTE KIDNEY INJURY) (HCC): Status: RESOLVED | Noted: 2019-09-25 | Resolved: 2019-09-27

## 2019-09-27 LAB
BACTERIA SPEC AEROBE CULT: ABNORMAL
GLUCOSE BLDC GLUCOMTR-MCNC: 117 MG/DL (ref 70–130)
GLUCOSE BLDC GLUCOMTR-MCNC: 123 MG/DL (ref 70–130)
GLUCOSE BLDC GLUCOMTR-MCNC: 158 MG/DL (ref 70–130)
GLUCOSE BLDC GLUCOMTR-MCNC: 175 MG/DL (ref 70–130)

## 2019-09-27 PROCEDURE — 94799 UNLISTED PULMONARY SVC/PX: CPT

## 2019-09-27 PROCEDURE — 82962 GLUCOSE BLOOD TEST: CPT

## 2019-09-27 PROCEDURE — 63710000001 INSULIN LISPRO (HUMAN) PER 5 UNITS: Performed by: NURSE PRACTITIONER

## 2019-09-27 PROCEDURE — 25010000002 CEFTRIAXONE PER 250 MG: Performed by: NURSE PRACTITIONER

## 2019-09-27 RX ORDER — HYDROCODONE BITARTRATE AND ACETAMINOPHEN 7.5; 325 MG/1; MG/1
1 TABLET ORAL EVERY 6 HOURS PRN
Qty: 12 TABLET | Refills: 0 | Status: SHIPPED | OUTPATIENT
Start: 2019-09-27 | End: 2020-05-27 | Stop reason: HOSPADM

## 2019-09-27 RX ORDER — LEVETIRACETAM 500 MG/1
500 TABLET ORAL
Start: 2019-09-28 | End: 2020-05-27 | Stop reason: HOSPADM

## 2019-09-27 RX ORDER — CEFDINIR 300 MG/1
300 CAPSULE ORAL 2 TIMES DAILY
Qty: 6 CAPSULE | Refills: 0
Start: 2019-09-27 | End: 2019-09-30

## 2019-09-27 RX ADMIN — INSULIN LISPRO 2 UNITS: 100 INJECTION, SOLUTION INTRAVENOUS; SUBCUTANEOUS at 17:22

## 2019-09-27 RX ADMIN — PIOGLITAZONE 15 MG: 15 TABLET ORAL at 09:00

## 2019-09-27 RX ADMIN — CEFTRIAXONE SODIUM 1 G: 1 INJECTION, SOLUTION INTRAVENOUS at 01:00

## 2019-09-27 RX ADMIN — LEVETIRACETAM 500 MG: 500 TABLET, FILM COATED ORAL at 17:22

## 2019-09-27 RX ADMIN — ESCITALOPRAM 10 MG: 10 TABLET, FILM COATED ORAL at 08:59

## 2019-09-27 RX ADMIN — LEVETIRACETAM 500 MG: 500 TABLET, FILM COATED ORAL at 09:00

## 2019-09-27 RX ADMIN — HYDRALAZINE HYDROCHLORIDE 50 MG: 50 TABLET, FILM COATED ORAL at 10:00

## 2019-09-27 RX ADMIN — HYDROCODONE BITARTRATE AND ACETAMINOPHEN 1 TABLET: 7.5; 325 TABLET ORAL at 15:46

## 2019-09-27 RX ADMIN — SODIUM CHLORIDE 75 ML/HR: 9 INJECTION, SOLUTION INTRAVENOUS at 12:09

## 2019-09-27 RX ADMIN — AMLODIPINE BESYLATE 10 MG: 10 TABLET ORAL at 10:00

## 2019-09-27 RX ADMIN — ASPIRIN 81 MG: 81 TABLET, COATED ORAL at 09:00

## 2019-09-27 RX ADMIN — SODIUM CHLORIDE, PRESERVATIVE FREE 10 ML: 5 INJECTION INTRAVENOUS at 22:28

## 2019-09-27 RX ADMIN — ATORVASTATIN CALCIUM 20 MG: 20 TABLET, FILM COATED ORAL at 08:59

## 2019-09-27 RX ADMIN — SODIUM CHLORIDE, PRESERVATIVE FREE 10 ML: 5 INJECTION INTRAVENOUS at 09:01

## 2019-09-27 RX ADMIN — LISINOPRIL 40 MG: 40 TABLET ORAL at 10:00

## 2019-09-27 RX ADMIN — HYDROCODONE BITARTRATE AND ACETAMINOPHEN 1 TABLET: 7.5; 325 TABLET ORAL at 09:07

## 2019-09-27 RX ADMIN — Medication 10 ML: at 21:00

## 2019-09-28 VITALS
RESPIRATION RATE: 16 BRPM | BODY MASS INDEX: 23.19 KG/M2 | HEIGHT: 68 IN | HEART RATE: 42 BPM | OXYGEN SATURATION: 98 % | SYSTOLIC BLOOD PRESSURE: 131 MMHG | WEIGHT: 153 LBS | TEMPERATURE: 97.8 F | DIASTOLIC BLOOD PRESSURE: 77 MMHG

## 2019-09-28 LAB
GLUCOSE BLDC GLUCOMTR-MCNC: 105 MG/DL (ref 70–130)
GLUCOSE BLDC GLUCOMTR-MCNC: 150 MG/DL (ref 70–130)

## 2019-09-28 PROCEDURE — 25010000002 CEFTRIAXONE PER 250 MG: Performed by: NURSE PRACTITIONER

## 2019-09-28 PROCEDURE — 82962 GLUCOSE BLOOD TEST: CPT

## 2019-09-28 RX ADMIN — HYDRALAZINE HYDROCHLORIDE 50 MG: 50 TABLET, FILM COATED ORAL at 08:14

## 2019-09-28 RX ADMIN — HYDROCODONE BITARTRATE AND ACETAMINOPHEN 1 TABLET: 7.5; 325 TABLET ORAL at 02:20

## 2019-09-28 RX ADMIN — HYDROCODONE BITARTRATE AND ACETAMINOPHEN 1 TABLET: 7.5; 325 TABLET ORAL at 09:30

## 2019-09-28 RX ADMIN — POLYETHYLENE GLYCOL 3350 17 G: 17 POWDER, FOR SOLUTION ORAL at 08:14

## 2019-09-28 RX ADMIN — ATORVASTATIN CALCIUM 20 MG: 20 TABLET, FILM COATED ORAL at 08:14

## 2019-09-28 RX ADMIN — LEVETIRACETAM 500 MG: 500 TABLET, FILM COATED ORAL at 08:14

## 2019-09-28 RX ADMIN — PIOGLITAZONE 15 MG: 15 TABLET ORAL at 08:14

## 2019-09-28 RX ADMIN — ASPIRIN 81 MG: 81 TABLET, COATED ORAL at 08:14

## 2019-09-28 RX ADMIN — CEFTRIAXONE SODIUM 1 G: 1 INJECTION, SOLUTION INTRAVENOUS at 01:30

## 2019-09-28 RX ADMIN — LISINOPRIL 40 MG: 40 TABLET ORAL at 08:14

## 2019-09-28 RX ADMIN — ESCITALOPRAM 10 MG: 10 TABLET, FILM COATED ORAL at 08:14

## 2019-09-28 RX ADMIN — AMLODIPINE BESYLATE 10 MG: 10 TABLET ORAL at 08:14

## 2019-09-28 RX ADMIN — DOCUSATE SODIUM 100 MG: 100 CAPSULE, LIQUID FILLED ORAL at 08:14

## 2019-09-30 LAB
BACTERIA SPEC AEROBE CULT: NORMAL
BACTERIA SPEC AEROBE CULT: NORMAL

## 2019-10-01 RX ORDER — HYDROCODONE BITARTRATE AND ACETAMINOPHEN 7.5; 325 MG/1; MG/1
1 TABLET ORAL EVERY 6 HOURS PRN
Qty: 180 TABLET | Refills: 0 | Status: SHIPPED | OUTPATIENT
Start: 2019-10-01 | End: 2020-05-27 | Stop reason: HOSPADM

## 2019-10-02 ENCOUNTER — OUTSIDE FACILITY SERVICE (OUTPATIENT)
Dept: INTERNAL MEDICINE | Facility: CLINIC | Age: 64
End: 2019-10-02

## 2019-10-02 PROCEDURE — 99305 1ST NF CARE MODERATE MDM 35: CPT | Performed by: FAMILY MEDICINE

## 2019-10-08 ENCOUNTER — APPOINTMENT (OUTPATIENT)
Dept: CT IMAGING | Facility: HOSPITAL | Age: 64
End: 2019-10-08

## 2019-10-21 ENCOUNTER — OUTSIDE FACILITY SERVICE (OUTPATIENT)
Dept: INTERNAL MEDICINE | Facility: CLINIC | Age: 64
End: 2019-10-21

## 2019-10-21 PROCEDURE — 99309 SBSQ NF CARE MODERATE MDM 30: CPT | Performed by: NURSE PRACTITIONER

## 2019-11-13 ENCOUNTER — OUTSIDE FACILITY SERVICE (OUTPATIENT)
Dept: INTERNAL MEDICINE | Facility: CLINIC | Age: 64
End: 2019-11-13

## 2019-11-13 PROCEDURE — 99307 SBSQ NF CARE SF MDM 10: CPT | Performed by: FAMILY MEDICINE

## 2019-11-15 ENCOUNTER — OUTSIDE FACILITY SERVICE (OUTPATIENT)
Dept: INTERNAL MEDICINE | Facility: CLINIC | Age: 64
End: 2019-11-15

## 2019-11-15 PROCEDURE — 99307 SBSQ NF CARE SF MDM 10: CPT | Performed by: NURSE PRACTITIONER

## 2019-12-03 ENCOUNTER — OUTSIDE FACILITY SERVICE (OUTPATIENT)
Dept: INTERNAL MEDICINE | Facility: CLINIC | Age: 64
End: 2019-12-03

## 2019-12-03 PROCEDURE — 99308 SBSQ NF CARE LOW MDM 20: CPT | Performed by: NURSE PRACTITIONER

## 2020-03-04 ENCOUNTER — APPOINTMENT (OUTPATIENT)
Dept: GENERAL RADIOLOGY | Facility: HOSPITAL | Age: 65
End: 2020-03-04

## 2020-03-04 ENCOUNTER — HOSPITAL ENCOUNTER (EMERGENCY)
Facility: HOSPITAL | Age: 65
Discharge: HOME | End: 2020-03-04
Attending: EMERGENCY MEDICINE | Admitting: EMERGENCY MEDICINE

## 2020-03-04 ENCOUNTER — APPOINTMENT (OUTPATIENT)
Dept: CT IMAGING | Facility: HOSPITAL | Age: 65
End: 2020-03-04

## 2020-03-04 VITALS
TEMPERATURE: 98.2 F | RESPIRATION RATE: 18 BRPM | OXYGEN SATURATION: 98 % | WEIGHT: 200 LBS | HEIGHT: 68 IN | BODY MASS INDEX: 30.31 KG/M2 | HEART RATE: 54 BPM | DIASTOLIC BLOOD PRESSURE: 85 MMHG | SYSTOLIC BLOOD PRESSURE: 191 MMHG

## 2020-03-04 DIAGNOSIS — R41.0 CONFUSION: Primary | ICD-10-CM

## 2020-03-04 DIAGNOSIS — R00.1 BRADYCARDIA: ICD-10-CM

## 2020-03-04 LAB
ALBUMIN SERPL-MCNC: 4 G/DL (ref 3.5–5.2)
ALBUMIN/GLOB SERPL: 1.7 G/DL
ALP SERPL-CCNC: 67 U/L (ref 39–117)
ALT SERPL W P-5'-P-CCNC: 21 U/L (ref 1–41)
ANION GAP SERPL CALCULATED.3IONS-SCNC: 9.1 MMOL/L (ref 5–15)
AST SERPL-CCNC: 15 U/L (ref 1–40)
BACTERIA UR QL AUTO: NORMAL /HPF
BASOPHILS # BLD AUTO: 0.04 10*3/MM3 (ref 0–0.2)
BASOPHILS NFR BLD AUTO: 0.6 % (ref 0–1.5)
BILIRUB SERPL-MCNC: <0.2 MG/DL (ref 0.2–1.2)
BILIRUB UR QL STRIP: NEGATIVE
BUN BLD-MCNC: 33 MG/DL (ref 8–23)
BUN/CREAT SERPL: 28.7 (ref 7–25)
CALCIUM SPEC-SCNC: 9 MG/DL (ref 8.6–10.5)
CHLORIDE SERPL-SCNC: 100 MMOL/L (ref 98–107)
CLARITY UR: CLEAR
CO2 SERPL-SCNC: 26.9 MMOL/L (ref 22–29)
COLOR UR: YELLOW
CREAT BLD-MCNC: 1.15 MG/DL (ref 0.76–1.27)
DEPRECATED RDW RBC AUTO: 45.6 FL (ref 37–54)
EOSINOPHIL # BLD AUTO: 0.15 10*3/MM3 (ref 0–0.4)
EOSINOPHIL NFR BLD AUTO: 2.1 % (ref 0.3–6.2)
ERYTHROCYTE [DISTWIDTH] IN BLOOD BY AUTOMATED COUNT: 13.1 % (ref 12.3–15.4)
GFR SERPL CREATININE-BSD FRML MDRD: 64 ML/MIN/1.73
GLOBULIN UR ELPH-MCNC: 2.4 GM/DL
GLUCOSE BLD-MCNC: 113 MG/DL (ref 65–99)
GLUCOSE UR STRIP-MCNC: NEGATIVE MG/DL
HCT VFR BLD AUTO: 33.6 % (ref 37.5–51)
HGB BLD-MCNC: 11 G/DL (ref 13–17.7)
HGB UR QL STRIP.AUTO: NEGATIVE
HYALINE CASTS UR QL AUTO: NORMAL /LPF
IMM GRANULOCYTES # BLD AUTO: 0.01 10*3/MM3 (ref 0–0.05)
IMM GRANULOCYTES NFR BLD AUTO: 0.1 % (ref 0–0.5)
KETONES UR QL STRIP: NEGATIVE
LEUKOCYTE ESTERASE UR QL STRIP.AUTO: NEGATIVE
LYMPHOCYTES # BLD AUTO: 2.34 10*3/MM3 (ref 0.7–3.1)
LYMPHOCYTES NFR BLD AUTO: 32.9 % (ref 19.6–45.3)
MCH RBC QN AUTO: 30.9 PG (ref 26.6–33)
MCHC RBC AUTO-ENTMCNC: 32.7 G/DL (ref 31.5–35.7)
MCV RBC AUTO: 94.4 FL (ref 79–97)
MONOCYTES # BLD AUTO: 0.67 10*3/MM3 (ref 0.1–0.9)
MONOCYTES NFR BLD AUTO: 9.4 % (ref 5–12)
NEUTROPHILS # BLD AUTO: 3.9 10*3/MM3 (ref 1.7–7)
NEUTROPHILS NFR BLD AUTO: 54.9 % (ref 42.7–76)
NITRITE UR QL STRIP: NEGATIVE
NRBC BLD AUTO-RTO: 0 /100 WBC (ref 0–0.2)
PH UR STRIP.AUTO: 7 [PH] (ref 5–8)
PLATELET # BLD AUTO: 300 10*3/MM3 (ref 140–450)
PMV BLD AUTO: 9 FL (ref 6–12)
POTASSIUM BLD-SCNC: 4.9 MMOL/L (ref 3.5–5.2)
PROT SERPL-MCNC: 6.4 G/DL (ref 6–8.5)
PROT UR QL STRIP: ABNORMAL
RBC # BLD AUTO: 3.56 10*6/MM3 (ref 4.14–5.8)
RBC # UR: NORMAL /HPF
REF LAB TEST METHOD: NORMAL
SODIUM BLD-SCNC: 136 MMOL/L (ref 136–145)
SP GR UR STRIP: 1.02 (ref 1–1.03)
SQUAMOUS #/AREA URNS HPF: NORMAL /HPF
UROBILINOGEN UR QL STRIP: ABNORMAL
WBC NRBC COR # BLD: 7.11 10*3/MM3 (ref 3.4–10.8)
WBC UR QL AUTO: NORMAL /HPF

## 2020-03-04 PROCEDURE — 93005 ELECTROCARDIOGRAM TRACING: CPT | Performed by: EMERGENCY MEDICINE

## 2020-03-04 PROCEDURE — 80053 COMPREHEN METABOLIC PANEL: CPT | Performed by: EMERGENCY MEDICINE

## 2020-03-04 PROCEDURE — 99285 EMERGENCY DEPT VISIT HI MDM: CPT

## 2020-03-04 PROCEDURE — 70450 CT HEAD/BRAIN W/O DYE: CPT

## 2020-03-04 PROCEDURE — 71046 X-RAY EXAM CHEST 2 VIEWS: CPT

## 2020-03-04 PROCEDURE — 93010 ELECTROCARDIOGRAM REPORT: CPT | Performed by: INTERNAL MEDICINE

## 2020-03-04 PROCEDURE — 85025 COMPLETE CBC W/AUTO DIFF WBC: CPT | Performed by: EMERGENCY MEDICINE

## 2020-03-04 PROCEDURE — P9612 CATHETERIZE FOR URINE SPEC: HCPCS

## 2020-03-04 PROCEDURE — 81001 URINALYSIS AUTO W/SCOPE: CPT | Performed by: EMERGENCY MEDICINE

## 2020-03-04 RX ORDER — SODIUM CHLORIDE 0.9 % (FLUSH) 0.9 %
10 SYRINGE (ML) INJECTION AS NEEDED
Status: DISCONTINUED | OUTPATIENT
Start: 2020-03-04 | End: 2020-03-04 | Stop reason: HOSPADM

## 2020-03-04 NOTE — ED PROVIDER NOTES
EMERGENCY DEPARTMENT ENCOUNTER    Room Number:  08/08  Date seen:  3/4/2020  Time seen: 12:36 PM  PCP: System, Provider Not In  Historian: pt      HPI:  Chief Complaint: AMS  A complete HPI/ROS/PMH/PSH/SH/FH are unobtainable due to: Dementia  Context: Trever Petit is a 64 y.o. male with a hx of bradycardia, dementia, and diabetes who presents to the ED c/o moderate to severe constant AMS. EMS reported to the nurse that pt is oriented to person/place which is the pt's baseline.   EMS reports the pt's facility called because the pt appeared altered.  Nurse states EMS reported that pt's sugar was 170s.  Pt admits to testicular pain but denies any chest pain, cough, SOA, headache.      PAST MEDICAL HISTORY  Active Ambulatory Problems     Diagnosis Date Noted   • Hypertensive emergency 06/21/2019   • Essential hypertension 06/25/2019   • Stroke (CMS/[facility]) 07/02/2019   • Hypoglycemia 08/25/2019   • Vascular dementia (CMS/[facility]) 08/25/2019   • Type 2 diabetes mellitus, without long-term current use of insulin (CMS/[facility]) 08/25/2019   • Constipation 08/28/2019   • Leukocytosis 08/28/2019   • Fever 08/28/2019   • Closed fracture of multiple ribs of left side 08/29/2019   • Pleural effusion 08/30/2019   • Left lower lobe pneumonia (CMS/[facility]) 08/30/2019   • Cystitis 09/25/2019   • Seizure-like activity (CMS/[facility]) 09/25/2019   • E. coli UTI (urinary tract infection) 09/26/2019     Resolved Ambulatory Problems     Diagnosis Date Noted   • JENI (acute kidney injury) (CMS/[facility]) 09/25/2019     Past Medical History:   Diagnosis Date   • Arthritis    • Carpal bone fracture    • Carpal tunnel syndrome    • Cerebrovascular accident (CMS/[facility])    • COPD (chronic obstructive pulmonary disease) (CMS/[facility])    • Coronary artery disease    • Diabetes mellitus (CMS/[facility])    • Elevated cholesterol    • Hypertension    • Myocardial infarction (CMS/[facility])    • Neuropathy    • Spinal stenosis          PAST SURGICAL HISTORY  Past Surgical History:    Procedure Laterality Date   • CARDIAC CATHETERIZATION N/A 6/25/2019    Procedure: Left Heart Cath;  Surgeon: Chen Aguilar MD;  Location:  [facility];  Service: Cardiovascular   • CARDIAC CATHETERIZATION N/A 6/25/2019    Procedure: Left ventriculography;  Surgeon: Chen Aguilar MD;  Location:  [facility];  Service: Cardiovascular   • CARDIAC CATHETERIZATION N/A 6/25/2019    Procedure: Coronary angiography;  Surgeon: Chen Aguilar MD;  Location:  [facility];  Service: Cardiovascular   • CARPAL TUNNEL RELEASE     • CERVICAL FUSION     • NECK SURGERY           FAMILY HISTORY  No family history on file.      SOCIAL HISTORY  Social History     Socioeconomic History   • Marital status: Single     Spouse name: Not on file   • Number of children: Not on file   • Years of education: Not on file   • Highest education level: Not on file   Tobacco Use   • Smoking status: Never Smoker   Substance and Sexual Activity   • Alcohol use: No     Frequency: Never   • Drug use: Yes     Types: Marijuana   • Sexual activity: Defer         ALLERGIES  Clonidine derivatives; Hydrochlorothiazide; Metformin; and Sitagliptin        REVIEW OF SYSTEMS  Review of Systems   Unable to perform ROS: Dementia   Genitourinary: Positive for testicular pain.            PHYSICAL EXAM  ED Triage Vitals [03/04/20 1213]   Temp Heart Rate Resp BP SpO2   97.7 °F (36.5 °C) (!) 43 18 (!) 200/82 99 %      Temp src Heart Rate Source Patient Position BP Location FiO2 (%)   -- -- -- -- --         GENERAL: awake and alert, no acute distress  HENT: nares patent, no scalp hematoma,  EYES: no scleral icterus, pupils 4mm and reactive bilaterally  CV: bradycardia, normal rate  RESPIRATORY: normal effort, CTAB  ABDOMEN: soft, nontender throughout  MUSCULOSKELETAL: no deformity  : normal thallus and scrotum, no inguinal hernia palpable.   NEURO: alert, moves all extremities, follows commands, oriented to  person only  SKIN: warm, dry    Vital signs and nursing notes reviewed.          LAB RESULTS  Recent Results (from the past 24 hour(s))   Comprehensive Metabolic Panel    Collection Time: 03/04/20 12:44 PM   Result Value Ref Range    Glucose 113 (H) 65 - 99 mg/dL    BUN 33 (H) 8 - 23 mg/dL    Creatinine 1.15 0.76 - 1.27 mg/dL    Sodium 136 136 - 145 mmol/L    Potassium 4.9 3.5 - 5.2 mmol/L    Chloride 100 98 - 107 mmol/L    CO2 26.9 22.0 - 29.0 mmol/L    Calcium 9.0 8.6 - 10.5 mg/dL    Total Protein 6.4 6.0 - 8.5 g/dL    Albumin 4.00 3.50 - 5.20 g/dL    ALT (SGPT) 21 1 - 41 U/L    AST (SGOT) 15 1 - 40 U/L    Alkaline Phosphatase 67 39 - 117 U/L    Total Bilirubin <0.2 (L) 0.2 - 1.2 mg/dL    eGFR Non African Amer 64 >60 mL/min/1.73    Globulin 2.4 gm/dL    A/G Ratio 1.7 g/dL    BUN/Creatinine Ratio 28.7 (H) 7.0 - 25.0    Anion Gap 9.1 5.0 - 15.0 mmol/L   CBC Auto Differential    Collection Time: 03/04/20 12:44 PM   Result Value Ref Range    WBC 7.11 3.40 - 10.80 10*3/mm3    RBC 3.56 (L) 4.14 - 5.80 10*6/mm3    Hemoglobin 11.0 (L) 13.0 - 17.7 g/dL    Hematocrit 33.6 (L) 37.5 - 51.0 %    MCV 94.4 79.0 - 97.0 fL    MCH 30.9 26.6 - 33.0 pg    MCHC 32.7 31.5 - 35.7 g/dL    RDW 13.1 12.3 - 15.4 %    RDW-SD 45.6 37.0 - 54.0 fl    MPV 9.0 6.0 - 12.0 fL    Platelets 300 140 - 450 10*3/mm3    Neutrophil % 54.9 42.7 - 76.0 %    Lymphocyte % 32.9 19.6 - 45.3 %    Monocyte % 9.4 5.0 - 12.0 %    Eosinophil % 2.1 0.3 - 6.2 %    Basophil % 0.6 0.0 - 1.5 %    Immature Grans % 0.1 0.0 - 0.5 %    Neutrophils, Absolute 3.90 1.70 - 7.00 10*3/mm3    Lymphocytes, Absolute 2.34 0.70 - 3.10 10*3/mm3    Monocytes, Absolute 0.67 0.10 - 0.90 10*3/mm3    Eosinophils, Absolute 0.15 0.00 - 0.40 10*3/mm3    Basophils, Absolute 0.04 0.00 - 0.20 10*3/mm3    Immature Grans, Absolute 0.01 0.00 - 0.05 10*3/mm3    nRBC 0.0 0.0 - 0.2 /100 WBC   Urinalysis With Culture If Indicated - Urine, Catheter In/Out    Collection Time: 03/04/20  1:03 PM   Result  Value Ref Range    Color, UA Yellow Yellow, Straw    Appearance, UA Clear Clear    pH, UA 7.0 5.0 - 8.0    Specific Gravity, UA 1.017 1.005 - 1.030    Glucose, UA Negative Negative    Ketones, UA Negative Negative    Bilirubin, UA Negative Negative    Blood, UA Negative Negative    Protein, UA 30 mg/dL (1+) (A) Negative    Leuk Esterase, UA Negative Negative    Nitrite, UA Negative Negative    Urobilinogen, UA 0.2 E.U./dL 0.2 - 1.0 E.U./dL   Urinalysis, Microscopic Only - Urine, Catheter In/Out    Collection Time: 03/04/20  1:03 PM   Result Value Ref Range    RBC, UA 0-2 None Seen, 0-2 /HPF    WBC, UA 0-2 None Seen, 0-2 /HPF    Bacteria, UA None Seen None Seen /HPF    Squamous Epithelial Cells, UA 0-2 None Seen, 0-2 /HPF    Hyaline Casts, UA 0-2 None Seen /LPF    Methodology Automated Microscopy        Ordered the above labs and reviewed the results.        RADIOLOGY  Xr Chest 2 View    Result Date: 3/4/2020  Chest radiograph  HISTORY:Altered mental status  TECHNIQUE: Two PA and lateral radiographs  COMPARISON:Chest radiograph 09/05/2019      FINDINGS AND IMPRESSION: There is no pleural effusion. Cervical spine hardware is incompletely evaluated. Sequela of prior granulomatous disease is present, as before. There is no pulmonary consolidation. No pneumothorax is seen. Heart is normal in size.  This report was finalized on 3/4/2020 1:38 PM by Dr. Rafita Dimas M.D.      Ct Head Without Contrast    Result Date: 3/4/2020  CT HEAD WITHOUT CONTRAST  HISTORY: Altered mental status.  COMPARISON: CT head 09/25/2019.  FINDINGS: There is a large area of encephalomalacia involving the right middle cerebral artery vascular distribution, unchanged versus 09/25/2019 and consistent with a remote infarct. There is no evidence of new infarction, hydrocephalus or midline shift. A remote infarct involving the anterior limb of the internal capsule is noted on the left, present previously. Moderate vascular calcification is noted.       Remote infarcts as described including a large remote right MCA distribution infarct. There is no evidence of acute infarction or hemorrhage. Further evaluation could be performed with an MRI examination of the brain as indicated.  The above information was called to and discussed with Dr. Blankenship.    Radiation dose reduction techniques were utilized, including automated exposure control and exposure modulation based on body size.  This report was finalized on 3/4/2020 3:57 PM by Dr. Anival Villa M.D.        Ordered the above noted radiological studies. Reviewed by me in PACS.  Spoke with Dr. Villa (radiologist) regarding results.      PROCEDURES  Procedures        EKG:           EKG time: 1249  Rhythm/Rate: sinus bradycardia rate of 42  P waves and SC: mild 1st degree AV block  QRS, axis: RBBB, LAFB   ST and T waves: TWI in leads III and aVF     Interpreted Contemporaneously by me, independently viewed  Similar compared to prior 9/24/19, rate is slower today        MEDICATIONS GIVEN IN ER  Medications - No data to display        PROGRESS AND CONSULTS and MDM  ED Course as of Mar 04 2259   Wed Mar 04, 2020   1444 64-year-old gentleman presents from a nursing facility due to reported altered mental status from his baseline.  He does reportedly have advanced dementia.  He is currently oriented to self only but has no complaints.  He was noted to be hypertensive and bradycardic on arrival.  He appears to have long history of bradycardia with multiple previous visits with heart rates in the 40s.  He is not appear symptomatic from that currently.  He has a nonfocal neurologic exam.  Urinalysis, chest x-ray, CBC, CMP are reassuring today.  In addition, CT head appears stable from prior exam with persistent right cerebral encephalomalacia but no acute hemorrhage or obvious acute abnormality on today's study.  He is appropriate for discharge back to nursing facility with return precautions provided.    [JR]      ED  Course User Index  [JR] Bebeto Blankenship MD       [id number]  Discussed plan to do labs and head CT. Pt understands and agrees with the plan. All questions have been answered.        MDM  Number of Diagnoses or Management Options     Amount and/or Complexity of Data Reviewed  Clinical lab tests: ordered and reviewed (BUN 33, Negative UA)  Tests in the radiology section of CPT®: ordered and reviewed (Chest XR - Negative  Head CT - Similar to prior)  Tests in the medicine section of CPT®: ordered and reviewed (Refer to the procedure section of the note for EKG results)  Discussion of test results with the performing providers: yes (Dr Villa)  Decide to obtain previous medical records or to obtain history from someone other than the patient: yes (Epic)               DIAGNOSIS  Final diagnoses:   Confusion   Bradycardia         DISPOSITION  DISCHARGE    Patient discharged in stable condition.    Reviewed implications of results, diagnosis, meds, responsibility to follow up, warning signs and symptoms of possible worsening, potential complications and reasons to return to ER.    Patient/Family voiced understanding of above instructions.    Discussed plan for discharge, as there is no emergent indication for admission. Patient referred to primary care provider for BP management due to today's BP. Pt/family is agreeable and understands need for follow up and repeat testing.  Pt is aware that discharge does not mean that nothing is wrong but it indicates no emergency is present that requires admission and they must continue care with follow-up as given below or physician of their choice.     FOLLOW-UP  PATIENT LIAISON [facility] [address]  [phone]  Call in 1 day           Medication List      No changes were made to your prescriptions during this visit.               Latest Documented Vital Signs:  As of 10:59 PM  BP- (!) 191/85 HR- 54 Temp- 98.2 °F (36.8 °C) (Oral) O2 sat- 98%        --  Documentation  assistance provided by pastor Morales for Dr. CHRIS Blankenship MD.  Information recorded by the [email or web address] was done at my direction and has been verified and validated by me.      Please note that portions of this were completed with a voice recognition program.     Lynda Morales  03/04/20 [id number]       Bebeto Blankenship MD  03/04/20 9633

## 2020-03-04 NOTE — PROGRESS NOTES
"Late entry- 1455- primary RN Allison requested assistance with transport back to [facility]. According to staff at facility, they dont provide transportation. Reviewed record to determine need for assistance. Requested RN obtain a \"road test\" to determine patients ability to ambulate. Called patients brother listed on emergency contact who advised he is disabled himself and is unable to provide a rider or money to pay for ride.  1530- call placed to supervisor Yesy Roman to discuss options for transportation for patient. Determined to call cab and escort patient back to facility. 1540- Contacted [facility] to schedule ride for patient. 1550-Ride has arrived- assisted patient to cab and escorted patient to [facility], assisted patient inside and handed off d/c paperwork and patient to staff. Brittany Vargas RN    "

## 2020-03-04 NOTE — ED NOTES
Report called to Bandar at [facility] at this time.     Allison Persaud, RN  03/04/20 8207

## 2020-03-10 ENCOUNTER — TELEPHONE (OUTPATIENT)
Dept: INTERNAL MEDICINE | Facility: CLINIC | Age: 65
End: 2020-03-10

## 2020-03-10 NOTE — TELEPHONE ENCOUNTER
[facility] called stating patient was seen in the ER on 03/04/2020. On his discharge summary our office phone number was given to schedule a hospital follow up. Patient is in [facility] and is under the care of physician on staff there. [facility] wanted to make sure patient is okay with seeing physician or if he would need an appointment with Patricio Kelly. Please advise. IN chart it appears Leticia took care of patient while working at [facility] not in the [facility] office. Please advise       I notified [facility] it appears patient would continue to see in house physician but I would double check with patricio CAR

## 2020-05-23 ENCOUNTER — APPOINTMENT (OUTPATIENT)
Dept: GENERAL RADIOLOGY | Facility: HOSPITAL | Age: 65
End: 2020-05-23

## 2020-05-23 ENCOUNTER — APPOINTMENT (OUTPATIENT)
Dept: CT IMAGING | Facility: HOSPITAL | Age: 65
End: 2020-05-23

## 2020-05-23 ENCOUNTER — HOSPITAL ENCOUNTER (INPATIENT)
Facility: HOSPITAL | Age: 65
LOS: 4 days | Discharge: NURSING, REHAB OR LONG-TERM CARE | End: 2020-05-27
Attending: EMERGENCY MEDICINE | Admitting: HOSPITALIST

## 2020-05-23 DIAGNOSIS — S12.001A CLOSED NONDISPLACED FRACTURE OF FIRST CERVICAL VERTEBRA, UNSPECIFIED FRACTURE MORPHOLOGY, INITIAL ENCOUNTER (HCC): ICD-10-CM
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
## 2020-05-23 DIAGNOSIS — S01.81XA LACERATION OF BROW WITHOUT COMPLICATION, INITIAL ENCOUNTER: ICD-10-CM

## 2020-05-23 DIAGNOSIS — S09.90XA INJURY OF HEAD, INITIAL ENCOUNTER: ICD-10-CM

## 2020-05-23 DIAGNOSIS — Z87.898 HISTORY OF BRADYCARDIA: ICD-10-CM

## 2020-05-23 PROBLEM — S12.000A C1 CERVICAL FRACTURE (HCC): Status: ACTIVE | Noted: 2020-05-23

## 2020-05-23 PROBLEM — A49.9 UTI (URINARY TRACT INFECTION), BACTERIAL: Status: ACTIVE | Noted: 2020-05-23

## 2020-05-23 PROBLEM — N39.0 UTI (URINARY TRACT INFECTION), BACTERIAL: Status: ACTIVE | Noted: 2020-05-23

## 2020-05-23 LAB
ALBUMIN SERPL-MCNC: 3.5 G/DL (ref 3.5–5.2)
ALBUMIN/GLOB SERPL: 1.8 G/DL
ALP SERPL-CCNC: 47 U/L (ref 39–117)
ALT SERPL W P-5'-P-CCNC: 19 U/L (ref 1–41)
ANION GAP SERPL CALCULATED.3IONS-SCNC: 8.7 MMOL/L (ref 5–15)
AST SERPL-CCNC: 12 U/L (ref 1–40)
BACTERIA UR QL AUTO: ABNORMAL /HPF
BASOPHILS # BLD AUTO: 0.06 10*3/MM3 (ref 0–0.2)
BASOPHILS NFR BLD AUTO: 1 % (ref 0–1.5)
BILIRUB SERPL-MCNC: 0.2 MG/DL (ref 0.2–1.2)
BILIRUB UR QL STRIP: NEGATIVE
BUN BLD-MCNC: 27 MG/DL (ref 8–23)
BUN/CREAT SERPL: 28.7 (ref 7–25)
CALCIUM SPEC-SCNC: 8.6 MG/DL (ref 8.6–10.5)
CHLORIDE SERPL-SCNC: 101 MMOL/L (ref 98–107)
CK SERPL-CCNC: 64 U/L (ref 20–200)
CLARITY UR: ABNORMAL
CO2 SERPL-SCNC: 28.3 MMOL/L (ref 22–29)
COLOR UR: YELLOW
CREAT BLD-MCNC: 0.94 MG/DL (ref 0.76–1.27)
DEPRECATED RDW RBC AUTO: 54.2 FL (ref 37–54)
EOSINOPHIL # BLD AUTO: 0.14 10*3/MM3 (ref 0–0.4)
EOSINOPHIL NFR BLD AUTO: 2.3 % (ref 0.3–6.2)
ERYTHROCYTE [DISTWIDTH] IN BLOOD BY AUTOMATED COUNT: 14.9 % (ref 12.3–15.4)
GFR SERPL CREATININE-BSD FRML MDRD: 81 ML/MIN/1.73
GLOBULIN UR ELPH-MCNC: 2 GM/DL
GLUCOSE BLD-MCNC: 80 MG/DL (ref 65–99)
GLUCOSE BLDC GLUCOMTR-MCNC: 178 MG/DL (ref 70–130)
GLUCOSE BLDC GLUCOMTR-MCNC: 85 MG/DL (ref 70–130)
GLUCOSE BLDC GLUCOMTR-MCNC: 88 MG/DL (ref 70–130)
GLUCOSE BLDC GLUCOMTR-MCNC: 98 MG/DL (ref 70–130)
GLUCOSE UR STRIP-MCNC: NEGATIVE MG/DL
HCT VFR BLD AUTO: 34 % (ref 37.5–51)
HGB BLD-MCNC: 11.2 G/DL (ref 13–17.7)
HGB UR QL STRIP.AUTO: ABNORMAL
HOLD SPECIMEN: NORMAL
HOLD SPECIMEN: NORMAL
HYALINE CASTS UR QL AUTO: ABNORMAL /LPF
IMM GRANULOCYTES # BLD AUTO: 0.01 10*3/MM3 (ref 0–0.05)
IMM GRANULOCYTES NFR BLD AUTO: 0.2 % (ref 0–0.5)
KETONES UR QL STRIP: NEGATIVE
LEUKOCYTE ESTERASE UR QL STRIP.AUTO: ABNORMAL
LYMPHOCYTES # BLD AUTO: 2.43 10*3/MM3 (ref 0.7–3.1)
LYMPHOCYTES NFR BLD AUTO: 39.8 % (ref 19.6–45.3)
MAGNESIUM SERPL-MCNC: 2 MG/DL (ref 1.6–2.4)
MCH RBC QN AUTO: 32.5 PG (ref 26.6–33)
MCHC RBC AUTO-ENTMCNC: 32.9 G/DL (ref 31.5–35.7)
MCV RBC AUTO: 98.6 FL (ref 79–97)
MONOCYTES # BLD AUTO: 0.47 10*3/MM3 (ref 0.1–0.9)
MONOCYTES NFR BLD AUTO: 7.7 % (ref 5–12)
NEUTROPHILS # BLD AUTO: 3 10*3/MM3 (ref 1.7–7)
NEUTROPHILS NFR BLD AUTO: 49 % (ref 42.7–76)
NITRITE UR QL STRIP: NEGATIVE
NRBC BLD AUTO-RTO: 0 /100 WBC (ref 0–0.2)
PH UR STRIP.AUTO: 7.5 [PH] (ref 5–8)
PLATELET # BLD AUTO: 277 10*3/MM3 (ref 140–450)
PMV BLD AUTO: 9.1 FL (ref 6–12)
POTASSIUM BLD-SCNC: 4.3 MMOL/L (ref 3.5–5.2)
PROT SERPL-MCNC: 5.5 G/DL (ref 6–8.5)
PROT UR QL STRIP: ABNORMAL
RBC # BLD AUTO: 3.45 10*6/MM3 (ref 4.14–5.8)
RBC # UR: ABNORMAL /HPF
REF LAB TEST METHOD: ABNORMAL
SARS-COV-2 RNA RESP QL NAA+PROBE: NOT DETECTED
SODIUM BLD-SCNC: 138 MMOL/L (ref 136–145)
SP GR UR STRIP: 1.02 (ref 1–1.03)
SQUAMOUS #/AREA URNS HPF: ABNORMAL /HPF
TROPONIN T SERPL-MCNC: 0.02 NG/ML (ref 0–0.03)
TSH SERPL DL<=0.05 MIU/L-ACNC: 1.37 UIU/ML (ref 0.27–4.2)
UROBILINOGEN UR QL STRIP: ABNORMAL
VALPROATE SERPL-MCNC: 41 MCG/ML (ref 50–125)
WBC CLUMPS # UR AUTO: ABNORMAL /HPF
WBC NRBC COR # BLD: 6.11 10*3/MM3 (ref 3.4–10.8)
WBC UR QL AUTO: ABNORMAL /HPF
WHOLE BLOOD HOLD SPECIMEN: NORMAL
WHOLE BLOOD HOLD SPECIMEN: NORMAL

## 2020-05-23 PROCEDURE — P9612 CATHETERIZE FOR URINE SPEC: HCPCS

## 2020-05-23 PROCEDURE — 70450 CT HEAD/BRAIN W/O DYE: CPT

## 2020-05-23 PROCEDURE — 82962 GLUCOSE BLOOD TEST: CPT

## 2020-05-23 PROCEDURE — 99285 EMERGENCY DEPT VISIT HI MDM: CPT

## 2020-05-23 PROCEDURE — 87186 SC STD MICRODIL/AGAR DIL: CPT | Performed by: EMERGENCY MEDICINE

## 2020-05-23 PROCEDURE — 80053 COMPREHEN METABOLIC PANEL: CPT | Performed by: EMERGENCY MEDICINE

## 2020-05-23 PROCEDURE — 93010 ELECTROCARDIOGRAM REPORT: CPT | Performed by: INTERNAL MEDICINE

## 2020-05-23 PROCEDURE — 90471 IMMUNIZATION ADMIN: CPT | Performed by: EMERGENCY MEDICINE

## 2020-05-23 PROCEDURE — 85025 COMPLETE CBC W/AUTO DIFF WBC: CPT | Performed by: EMERGENCY MEDICINE

## 2020-05-23 PROCEDURE — 25010000002 TDAP 5-2.5-18.5 LF-MCG/0.5 SUSPENSION: Performed by: EMERGENCY MEDICINE

## 2020-05-23 PROCEDURE — 87077 CULTURE AEROBIC IDENTIFY: CPT | Performed by: EMERGENCY MEDICINE

## 2020-05-23 PROCEDURE — 93005 ELECTROCARDIOGRAM TRACING: CPT | Performed by: EMERGENCY MEDICINE

## 2020-05-23 PROCEDURE — 71045 X-RAY EXAM CHEST 1 VIEW: CPT

## 2020-05-23 PROCEDURE — 87086 URINE CULTURE/COLONY COUNT: CPT | Performed by: EMERGENCY MEDICINE

## 2020-05-23 PROCEDURE — U0004 COV-19 TEST NON-CDC HGH THRU: HCPCS | Performed by: EMERGENCY MEDICINE

## 2020-05-23 PROCEDURE — 90715 TDAP VACCINE 7 YRS/> IM: CPT | Performed by: EMERGENCY MEDICINE

## 2020-05-23 PROCEDURE — 84443 ASSAY THYROID STIM HORMONE: CPT | Performed by: EMERGENCY MEDICINE

## 2020-05-23 PROCEDURE — 83735 ASSAY OF MAGNESIUM: CPT | Performed by: EMERGENCY MEDICINE

## 2020-05-23 PROCEDURE — 99254 IP/OBS CNSLTJ NEW/EST MOD 60: CPT | Performed by: INTERNAL MEDICINE

## 2020-05-23 PROCEDURE — 82550 ASSAY OF CK (CPK): CPT | Performed by: EMERGENCY MEDICINE

## 2020-05-23 PROCEDURE — 25010000002 CEFTRIAXONE PER 250 MG: Performed by: HOSPITALIST

## 2020-05-23 PROCEDURE — 80164 ASSAY DIPROPYLACETIC ACD TOT: CPT | Performed by: EMERGENCY MEDICINE

## 2020-05-23 PROCEDURE — 72125 CT NECK SPINE W/O DYE: CPT

## 2020-05-23 PROCEDURE — 87635 SARS-COV-2 COVID-19 AMP PRB: CPT | Performed by: HOSPITALIST

## 2020-05-23 PROCEDURE — 84484 ASSAY OF TROPONIN QUANT: CPT | Performed by: EMERGENCY MEDICINE

## 2020-05-23 PROCEDURE — 81001 URINALYSIS AUTO W/SCOPE: CPT | Performed by: EMERGENCY MEDICINE

## 2020-05-23 PROCEDURE — 63710000001 INSULIN LISPRO (HUMAN) PER 5 UNITS: Performed by: HOSPITALIST

## 2020-05-23 RX ORDER — POLYETHYLENE GLYCOL 3350 17 G/17G
17 POWDER, FOR SOLUTION ORAL DAILY
Status: DISCONTINUED | OUTPATIENT
Start: 2020-05-23 | End: 2020-05-27 | Stop reason: HOSPADM

## 2020-05-23 RX ORDER — DEXTROSE MONOHYDRATE 25 G/50ML
25 INJECTION, SOLUTION INTRAVENOUS
Status: DISCONTINUED | OUTPATIENT
Start: 2020-05-23 | End: 2020-05-26

## 2020-05-23 RX ORDER — CEFTRIAXONE SODIUM 1 G/50ML
1 INJECTION, SOLUTION INTRAVENOUS EVERY 24 HOURS
Status: DISCONTINUED | OUTPATIENT
Start: 2020-05-23 | End: 2020-05-25

## 2020-05-23 RX ORDER — ONDANSETRON 4 MG/1
4 TABLET, FILM COATED ORAL EVERY 6 HOURS PRN
Status: DISCONTINUED | OUTPATIENT
Start: 2020-05-23 | End: 2020-05-27 | Stop reason: HOSPADM

## 2020-05-23 RX ORDER — TRIHEXYPHENIDYL HYDROCHLORIDE 2 MG/1
2 TABLET ORAL
Status: DISCONTINUED | OUTPATIENT
Start: 2020-05-23 | End: 2020-05-27 | Stop reason: HOSPADM

## 2020-05-23 RX ORDER — PAROXETINE 10 MG/1
20 TABLET, FILM COATED ORAL NIGHTLY
COMMUNITY
End: 2020-05-27 | Stop reason: HOSPADM

## 2020-05-23 RX ORDER — SODIUM CHLORIDE 0.9 % (FLUSH) 0.9 %
10 SYRINGE (ML) INJECTION EVERY 12 HOURS SCHEDULED
Status: DISCONTINUED | OUTPATIENT
Start: 2020-05-23 | End: 2020-05-27 | Stop reason: HOSPADM

## 2020-05-23 RX ORDER — ONDANSETRON 2 MG/ML
4 INJECTION INTRAMUSCULAR; INTRAVENOUS EVERY 6 HOURS PRN
Status: DISCONTINUED | OUTPATIENT
Start: 2020-05-23 | End: 2020-05-27 | Stop reason: HOSPADM

## 2020-05-23 RX ORDER — LISINOPRIL 40 MG/1
40 TABLET ORAL
Status: DISCONTINUED | OUTPATIENT
Start: 2020-05-23 | End: 2020-05-27 | Stop reason: HOSPADM

## 2020-05-23 RX ORDER — ACETAMINOPHEN 325 MG/1
650 TABLET ORAL EVERY 4 HOURS PRN
Status: DISCONTINUED | OUTPATIENT
Start: 2020-05-23 | End: 2020-05-27 | Stop reason: HOSPADM

## 2020-05-23 RX ORDER — ENALAPRILAT 2.5 MG/2ML
1.25 INJECTION INTRAVENOUS EVERY 6 HOURS PRN
Status: DISCONTINUED | OUTPATIENT
Start: 2020-05-23 | End: 2020-05-27 | Stop reason: HOSPADM

## 2020-05-23 RX ORDER — TRIHEXYPHENIDYL HYDROCHLORIDE 2 MG/1
2 TABLET ORAL 2 TIMES DAILY WITH MEALS
COMMUNITY
End: 2020-11-20 | Stop reason: HOSPADM

## 2020-05-23 RX ORDER — OXCARBAZEPINE 300 MG/1
600 TABLET, FILM COATED ORAL EVERY 12 HOURS SCHEDULED
Status: DISCONTINUED | OUTPATIENT
Start: 2020-05-23 | End: 2020-05-25

## 2020-05-23 RX ORDER — OXCARBAZEPINE 600 MG/1
600 TABLET, FILM COATED ORAL 2 TIMES DAILY
COMMUNITY
End: 2020-11-20 | Stop reason: HOSPADM

## 2020-05-23 RX ORDER — ENALAPRILAT 2.5 MG/2ML
1.25 INJECTION INTRAVENOUS EVERY 6 HOURS
Status: DISCONTINUED | OUTPATIENT
Start: 2020-05-23 | End: 2020-05-23

## 2020-05-23 RX ORDER — ACETAMINOPHEN 160 MG/5ML
650 SOLUTION ORAL EVERY 4 HOURS PRN
Status: DISCONTINUED | OUTPATIENT
Start: 2020-05-23 | End: 2020-05-27 | Stop reason: HOSPADM

## 2020-05-23 RX ORDER — SODIUM CHLORIDE 0.9 % (FLUSH) 0.9 %
10 SYRINGE (ML) INJECTION AS NEEDED
Status: DISCONTINUED | OUTPATIENT
Start: 2020-05-23 | End: 2020-05-27 | Stop reason: HOSPADM

## 2020-05-23 RX ORDER — PAROXETINE 10 MG/1
10 TABLET, FILM COATED ORAL EVERY MORNING
Status: DISCONTINUED | OUTPATIENT
Start: 2020-05-24 | End: 2020-05-27 | Stop reason: HOSPADM

## 2020-05-23 RX ORDER — GLIPIZIDE 2.5 MG/1
2.5 TABLET, EXTENDED RELEASE ORAL DAILY
COMMUNITY
End: 2020-05-27 | Stop reason: HOSPADM

## 2020-05-23 RX ORDER — OLANZAPINE 10 MG/1
2.5 INJECTION, POWDER, LYOPHILIZED, FOR SOLUTION INTRAMUSCULAR EVERY 8 HOURS PRN
Status: DISCONTINUED | OUTPATIENT
Start: 2020-05-23 | End: 2020-05-27 | Stop reason: HOSPADM

## 2020-05-23 RX ORDER — LIDOCAINE HYDROCHLORIDE AND EPINEPHRINE 10; 10 MG/ML; UG/ML
10 INJECTION, SOLUTION INFILTRATION; PERINEURAL ONCE
Status: COMPLETED | OUTPATIENT
Start: 2020-05-23 | End: 2020-05-23

## 2020-05-23 RX ORDER — ACETAMINOPHEN 650 MG/1
650 SUPPOSITORY RECTAL EVERY 4 HOURS PRN
Status: DISCONTINUED | OUTPATIENT
Start: 2020-05-23 | End: 2020-05-27 | Stop reason: HOSPADM

## 2020-05-23 RX ORDER — ACETAMINOPHEN 325 MG/1
650 TABLET ORAL EVERY 6 HOURS PRN
COMMUNITY

## 2020-05-23 RX ORDER — OLANZAPINE 2.5 MG/1
2.5 TABLET ORAL NIGHTLY
COMMUNITY
End: 2020-11-20 | Stop reason: HOSPADM

## 2020-05-23 RX ORDER — DIVALPROEX SODIUM 250 MG/1
250 TABLET, DELAYED RELEASE ORAL 3 TIMES DAILY
COMMUNITY
End: 2020-11-20 | Stop reason: HOSPADM

## 2020-05-23 RX ORDER — SODIUM CHLORIDE 9 MG/ML
100 INJECTION, SOLUTION INTRAVENOUS CONTINUOUS
Status: DISCONTINUED | OUTPATIENT
Start: 2020-05-23 | End: 2020-05-24

## 2020-05-23 RX ORDER — NICOTINE POLACRILEX 4 MG
15 LOZENGE BUCCAL
Status: DISCONTINUED | OUTPATIENT
Start: 2020-05-23 | End: 2020-05-26

## 2020-05-23 RX ORDER — AMLODIPINE BESYLATE 10 MG/1
10 TABLET ORAL
Status: DISCONTINUED | OUTPATIENT
Start: 2020-05-23 | End: 2020-05-26

## 2020-05-23 RX ORDER — OLANZAPINE 10 MG/1
2.5 INJECTION, POWDER, LYOPHILIZED, FOR SOLUTION INTRAMUSCULAR NIGHTLY
Status: DISCONTINUED | OUTPATIENT
Start: 2020-05-23 | End: 2020-05-23

## 2020-05-23 RX ADMIN — INSULIN LISPRO 2 UNITS: 100 INJECTION, SOLUTION INTRAVENOUS; SUBCUTANEOUS at 23:52

## 2020-05-23 RX ADMIN — CEFTRIAXONE SODIUM 1 G: 1 INJECTION, SOLUTION INTRAVENOUS at 15:56

## 2020-05-23 RX ADMIN — LIDOCAINE HYDROCHLORIDE,EPINEPHRINE BITARTRATE 10 ML: 10; .01 INJECTION, SOLUTION INFILTRATION; PERINEURAL at 11:37

## 2020-05-23 RX ADMIN — TETANUS TOXOID, REDUCED DIPHTHERIA TOXOID AND ACELLULAR PERTUSSIS VACCINE, ADSORBED 0.5 ML: 5; 2.5; 8; 8; 2.5 SUSPENSION INTRAMUSCULAR at 10:41

## 2020-05-23 RX ADMIN — SODIUM CHLORIDE, PRESERVATIVE FREE 10 ML: 5 INJECTION INTRAVENOUS at 20:04

## 2020-05-23 RX ADMIN — OXCARBAZEPINE 600 MG: 300 TABLET, FILM COATED ORAL at 15:10

## 2020-05-23 RX ADMIN — AMLODIPINE BESYLATE 10 MG: 10 TABLET ORAL at 18:07

## 2020-05-23 RX ADMIN — VALPROATE SODIUM 250 MG: 100 INJECTION, SOLUTION INTRAVENOUS at 11:41

## 2020-05-23 RX ADMIN — SODIUM CHLORIDE 100 ML/HR: 9 INJECTION, SOLUTION INTRAVENOUS at 23:52

## 2020-05-23 RX ADMIN — SODIUM CHLORIDE 100 ML/HR: 9 INJECTION, SOLUTION INTRAVENOUS at 13:03

## 2020-05-23 RX ADMIN — LISINOPRIL 40 MG: 40 TABLET ORAL at 15:10

## 2020-05-23 RX ADMIN — TRIHEXYPHENIDYL HYDROCHLORIDE 2 MG: 2 TABLET ORAL at 18:09

## 2020-05-23 RX ADMIN — SODIUM CHLORIDE, PRESERVATIVE FREE 10 ML: 5 INJECTION INTRAVENOUS at 15:29

## 2020-05-24 LAB
GLUCOSE BLDC GLUCOMTR-MCNC: 104 MG/DL (ref 70–130)
GLUCOSE BLDC GLUCOMTR-MCNC: 133 MG/DL (ref 70–130)
GLUCOSE BLDC GLUCOMTR-MCNC: 82 MG/DL (ref 70–130)
GLUCOSE BLDC GLUCOMTR-MCNC: 96 MG/DL (ref 70–130)

## 2020-05-24 PROCEDURE — 25010000002 CEFTRIAXONE PER 250 MG: Performed by: HOSPITALIST

## 2020-05-24 PROCEDURE — 99232 SBSQ HOSP IP/OBS MODERATE 35: CPT | Performed by: INTERNAL MEDICINE

## 2020-05-24 PROCEDURE — 82962 GLUCOSE BLOOD TEST: CPT

## 2020-05-24 RX ORDER — DIVALPROEX SODIUM 250 MG/1
250 TABLET, DELAYED RELEASE ORAL EVERY 8 HOURS SCHEDULED
Status: DISCONTINUED | OUTPATIENT
Start: 2020-05-24 | End: 2020-05-27 | Stop reason: HOSPADM

## 2020-05-24 RX ADMIN — POLYETHYLENE GLYCOL 3350 17 G: 17 POWDER, FOR SOLUTION ORAL at 09:13

## 2020-05-24 RX ADMIN — SODIUM CHLORIDE, PRESERVATIVE FREE 10 ML: 5 INJECTION INTRAVENOUS at 09:13

## 2020-05-24 RX ADMIN — DIVALPROEX SODIUM 250 MG: 250 TABLET, DELAYED RELEASE ORAL at 21:51

## 2020-05-24 RX ADMIN — OXCARBAZEPINE 600 MG: 300 TABLET, FILM COATED ORAL at 09:12

## 2020-05-24 RX ADMIN — TRIHEXYPHENIDYL HYDROCHLORIDE 2 MG: 2 TABLET ORAL at 11:57

## 2020-05-24 RX ADMIN — TRIHEXYPHENIDYL HYDROCHLORIDE 2 MG: 2 TABLET ORAL at 09:12

## 2020-05-24 RX ADMIN — ACETAMINOPHEN 650 MG: 325 TABLET, FILM COATED ORAL at 01:08

## 2020-05-24 RX ADMIN — SODIUM CHLORIDE 100 ML/HR: 9 INJECTION, SOLUTION INTRAVENOUS at 09:13

## 2020-05-24 RX ADMIN — SODIUM CHLORIDE, PRESERVATIVE FREE 10 ML: 5 INJECTION INTRAVENOUS at 21:51

## 2020-05-24 RX ADMIN — OXCARBAZEPINE 600 MG: 300 TABLET, FILM COATED ORAL at 21:51

## 2020-05-24 RX ADMIN — AMLODIPINE BESYLATE 10 MG: 10 TABLET ORAL at 09:12

## 2020-05-24 RX ADMIN — VALPROATE SODIUM 250 MG: 100 INJECTION, SOLUTION INTRAVENOUS at 01:08

## 2020-05-24 RX ADMIN — TRIHEXYPHENIDYL HYDROCHLORIDE 2 MG: 2 TABLET ORAL at 18:30

## 2020-05-24 RX ADMIN — PAROXETINE 10 MG: 10 TABLET, FILM COATED ORAL at 09:12

## 2020-05-24 RX ADMIN — CEFTRIAXONE SODIUM 1 G: 1 INJECTION, SOLUTION INTRAVENOUS at 12:58

## 2020-05-24 RX ADMIN — VALPROATE SODIUM 250 MG: 100 INJECTION, SOLUTION INTRAVENOUS at 11:57

## 2020-05-24 RX ADMIN — LISINOPRIL 40 MG: 40 TABLET ORAL at 09:12

## 2020-05-25 LAB
BACTERIA SPEC AEROBE CULT: ABNORMAL
GLUCOSE BLDC GLUCOMTR-MCNC: 101 MG/DL (ref 70–130)
GLUCOSE BLDC GLUCOMTR-MCNC: 102 MG/DL (ref 70–130)
GLUCOSE BLDC GLUCOMTR-MCNC: 117 MG/DL (ref 70–130)
GLUCOSE BLDC GLUCOMTR-MCNC: 96 MG/DL (ref 70–130)
REF LAB TEST METHOD: NORMAL
SARS-COV-2 RNA RESP QL NAA+PROBE: NOT DETECTED

## 2020-05-25 PROCEDURE — 82962 GLUCOSE BLOOD TEST: CPT

## 2020-05-25 RX ORDER — HYDRALAZINE HYDROCHLORIDE 50 MG/1
50 TABLET, FILM COATED ORAL EVERY 12 HOURS SCHEDULED
Status: DISCONTINUED | OUTPATIENT
Start: 2020-05-25 | End: 2020-05-26

## 2020-05-25 RX ORDER — OLANZAPINE 5 MG/1
5 TABLET ORAL NIGHTLY
Status: DISCONTINUED | OUTPATIENT
Start: 2020-05-25 | End: 2020-05-27 | Stop reason: HOSPADM

## 2020-05-25 RX ORDER — OXCARBAZEPINE 300 MG/1
600 TABLET, FILM COATED ORAL 2 TIMES DAILY
Status: DISCONTINUED | OUTPATIENT
Start: 2020-05-25 | End: 2020-05-27 | Stop reason: HOSPADM

## 2020-05-25 RX ORDER — NITROFURANTOIN 25; 75 MG/1; MG/1
100 CAPSULE ORAL EVERY 12 HOURS SCHEDULED
Status: DISCONTINUED | OUTPATIENT
Start: 2020-05-25 | End: 2020-05-27 | Stop reason: HOSPADM

## 2020-05-25 RX ADMIN — HYDRALAZINE HYDROCHLORIDE 50 MG: 50 TABLET, FILM COATED ORAL at 14:47

## 2020-05-25 RX ADMIN — LISINOPRIL 40 MG: 40 TABLET ORAL at 09:56

## 2020-05-25 RX ADMIN — PAROXETINE 10 MG: 10 TABLET, FILM COATED ORAL at 09:56

## 2020-05-25 RX ADMIN — POLYETHYLENE GLYCOL 3350 17 G: 17 POWDER, FOR SOLUTION ORAL at 09:56

## 2020-05-25 RX ADMIN — NITROFURANTOIN MONOHYDRATE/MACROCRYSTALLINE 100 MG: 25; 75 CAPSULE ORAL at 14:47

## 2020-05-25 RX ADMIN — OLANZAPINE 2.5 MG: 10 INJECTION, POWDER, FOR SOLUTION INTRAMUSCULAR at 05:01

## 2020-05-25 RX ADMIN — DIVALPROEX SODIUM 250 MG: 250 TABLET, DELAYED RELEASE ORAL at 05:01

## 2020-05-25 RX ADMIN — DIVALPROEX SODIUM 250 MG: 250 TABLET, DELAYED RELEASE ORAL at 14:47

## 2020-05-25 RX ADMIN — DIVALPROEX SODIUM 250 MG: 250 TABLET, DELAYED RELEASE ORAL at 20:37

## 2020-05-25 RX ADMIN — AMLODIPINE BESYLATE 10 MG: 10 TABLET ORAL at 09:56

## 2020-05-25 RX ADMIN — OXCARBAZEPINE 600 MG: 600 TABLET, FILM COATED ORAL at 20:40

## 2020-05-25 RX ADMIN — TRIHEXYPHENIDYL HYDROCHLORIDE 2 MG: 2 TABLET ORAL at 09:56

## 2020-05-25 RX ADMIN — OLANZAPINE 5 MG: 5 TABLET ORAL at 20:37

## 2020-05-25 RX ADMIN — SODIUM CHLORIDE, PRESERVATIVE FREE 10 ML: 5 INJECTION INTRAVENOUS at 09:58

## 2020-05-25 RX ADMIN — SODIUM CHLORIDE, PRESERVATIVE FREE 10 ML: 5 INJECTION INTRAVENOUS at 20:41

## 2020-05-25 RX ADMIN — OXCARBAZEPINE 600 MG: 600 TABLET, FILM COATED ORAL at 09:58

## 2020-05-25 RX ADMIN — TRIHEXYPHENIDYL HYDROCHLORIDE 2 MG: 2 TABLET ORAL at 17:36

## 2020-05-26 ENCOUNTER — TELEPHONE (OUTPATIENT)
Dept: NEUROSURGERY | Facility: CLINIC | Age: 65
End: 2020-05-26

## 2020-05-26 LAB
GLUCOSE BLDC GLUCOMTR-MCNC: 106 MG/DL (ref 70–130)
GLUCOSE BLDC GLUCOMTR-MCNC: 82 MG/DL (ref 70–130)
GLUCOSE BLDC GLUCOMTR-MCNC: 90 MG/DL (ref 70–130)
GLUCOSE BLDC GLUCOMTR-MCNC: 95 MG/DL (ref 70–130)
HBA1C MFR BLD: 5.42 % (ref 4.8–5.6)

## 2020-05-26 PROCEDURE — 83036 HEMOGLOBIN GLYCOSYLATED A1C: CPT | Performed by: HOSPITALIST

## 2020-05-26 PROCEDURE — 82962 GLUCOSE BLOOD TEST: CPT

## 2020-05-26 RX ORDER — AMLODIPINE BESYLATE 5 MG/1
5 TABLET ORAL
Status: DISCONTINUED | OUTPATIENT
Start: 2020-05-27 | End: 2020-05-27 | Stop reason: HOSPADM

## 2020-05-26 RX ADMIN — LISINOPRIL 40 MG: 40 TABLET ORAL at 08:43

## 2020-05-26 RX ADMIN — TRIHEXYPHENIDYL HYDROCHLORIDE 2 MG: 2 TABLET ORAL at 13:48

## 2020-05-26 RX ADMIN — PAROXETINE 10 MG: 10 TABLET, FILM COATED ORAL at 06:17

## 2020-05-26 RX ADMIN — DIVALPROEX SODIUM 250 MG: 250 TABLET, DELAYED RELEASE ORAL at 06:17

## 2020-05-26 RX ADMIN — DIVALPROEX SODIUM 250 MG: 250 TABLET, DELAYED RELEASE ORAL at 20:13

## 2020-05-26 RX ADMIN — TRIHEXYPHENIDYL HYDROCHLORIDE 2 MG: 2 TABLET ORAL at 16:39

## 2020-05-26 RX ADMIN — OLANZAPINE 5 MG: 5 TABLET ORAL at 20:06

## 2020-05-26 RX ADMIN — DIVALPROEX SODIUM 250 MG: 250 TABLET, DELAYED RELEASE ORAL at 13:48

## 2020-05-26 RX ADMIN — NITROFURANTOIN MONOHYDRATE/MACROCRYSTALLINE 100 MG: 25; 75 CAPSULE ORAL at 08:43

## 2020-05-26 RX ADMIN — OXCARBAZEPINE 600 MG: 600 TABLET, FILM COATED ORAL at 20:07

## 2020-05-26 RX ADMIN — TRIHEXYPHENIDYL HYDROCHLORIDE 2 MG: 2 TABLET ORAL at 08:43

## 2020-05-26 RX ADMIN — SODIUM CHLORIDE, PRESERVATIVE FREE 10 ML: 5 INJECTION INTRAVENOUS at 20:12

## 2020-05-26 RX ADMIN — AMLODIPINE BESYLATE 10 MG: 10 TABLET ORAL at 08:42

## 2020-05-26 RX ADMIN — NITROFURANTOIN MONOHYDRATE/MACROCRYSTALLINE 100 MG: 25; 75 CAPSULE ORAL at 20:06

## 2020-05-26 RX ADMIN — SODIUM CHLORIDE, PRESERVATIVE FREE 10 ML: 5 INJECTION INTRAVENOUS at 08:43

## 2020-05-26 RX ADMIN — OXCARBAZEPINE 600 MG: 600 TABLET, FILM COATED ORAL at 08:43

## 2020-05-27 ENCOUNTER — APPOINTMENT (OUTPATIENT)
Dept: CARDIOLOGY | Facility: HOSPITAL | Age: 65
End: 2020-05-27

## 2020-05-27 VITALS
WEIGHT: 146.83 LBS | TEMPERATURE: 97.3 F | HEART RATE: 43 BPM | SYSTOLIC BLOOD PRESSURE: 103 MMHG | OXYGEN SATURATION: 100 % | HEIGHT: 68 IN | BODY MASS INDEX: 22.25 KG/M2 | RESPIRATION RATE: 16 BRPM | DIASTOLIC BLOOD PRESSURE: 58 MMHG

## 2020-05-27 PROBLEM — G93.41 METABOLIC ENCEPHALOPATHY: Status: RESOLVED | Noted: 2020-05-27 | Resolved: 2020-05-27

## 2020-05-27 PROBLEM — R00.1 BRADYCARDIA: Status: ACTIVE | Noted: 2020-05-27

## 2020-05-27 PROBLEM — G93.41 METABOLIC ENCEPHALOPATHY: Status: ACTIVE | Noted: 2020-05-27

## 2020-05-27 PROBLEM — N39.0 UTI (URINARY TRACT INFECTION), BACTERIAL: Status: RESOLVED | Noted: 2020-05-23 | Resolved: 2020-05-27

## 2020-05-27 PROBLEM — A49.9 UTI (URINARY TRACT INFECTION), BACTERIAL: Status: RESOLVED | Noted: 2020-05-23 | Resolved: 2020-05-27

## 2020-05-27 LAB — GLUCOSE BLDC GLUCOMTR-MCNC: 87 MG/DL (ref 70–130)

## 2020-05-27 PROCEDURE — 82962 GLUCOSE BLOOD TEST: CPT

## 2020-05-27 PROCEDURE — 0296T HC EXT ECG > 48HR TO 21 DAY RCRD W/CONECT INTL RCRD: CPT

## 2020-05-27 RX ORDER — POLYETHYLENE GLYCOL 3350 17 G/17G
17 POWDER, FOR SOLUTION ORAL DAILY
Start: 2020-05-28 | End: 2020-11-20 | Stop reason: HOSPADM

## 2020-05-27 RX ORDER — AMLODIPINE BESYLATE 5 MG/1
5 TABLET ORAL
Start: 2020-05-28

## 2020-05-27 RX ORDER — PAROXETINE 10 MG/1
10 TABLET, FILM COATED ORAL EVERY MORNING
Start: 2020-05-28 | End: 2020-11-20 | Stop reason: HOSPADM

## 2020-05-27 RX ADMIN — SODIUM CHLORIDE, PRESERVATIVE FREE 10 ML: 5 INJECTION INTRAVENOUS at 09:21

## 2020-05-27 RX ADMIN — TRIHEXYPHENIDYL HYDROCHLORIDE 2 MG: 2 TABLET ORAL at 13:32

## 2020-05-27 RX ADMIN — PAROXETINE 10 MG: 10 TABLET, FILM COATED ORAL at 06:17

## 2020-05-27 RX ADMIN — LISINOPRIL 40 MG: 40 TABLET ORAL at 09:21

## 2020-05-27 RX ADMIN — ACETAMINOPHEN 650 MG: 325 TABLET, FILM COATED ORAL at 13:32

## 2020-05-27 RX ADMIN — TRIHEXYPHENIDYL HYDROCHLORIDE 2 MG: 2 TABLET ORAL at 09:22

## 2020-05-27 RX ADMIN — POLYETHYLENE GLYCOL 3350 17 G: 17 POWDER, FOR SOLUTION ORAL at 09:22

## 2020-05-27 RX ADMIN — AMLODIPINE BESYLATE 5 MG: 5 TABLET ORAL at 09:22

## 2020-05-27 RX ADMIN — DIVALPROEX SODIUM 250 MG: 250 TABLET, DELAYED RELEASE ORAL at 13:32

## 2020-05-27 RX ADMIN — OXCARBAZEPINE 600 MG: 600 TABLET, FILM COATED ORAL at 09:21

## 2020-05-27 RX ADMIN — DIVALPROEX SODIUM 250 MG: 250 TABLET, DELAYED RELEASE ORAL at 06:18

## 2020-05-27 RX ADMIN — NITROFURANTOIN MONOHYDRATE/MACROCRYSTALLINE 100 MG: 25; 75 CAPSULE ORAL at 09:21

## 2020-05-27 NOTE — TELEPHONE ENCOUNTER
"Note from Dr. Bush consult from the weekend: \"I can follow him up in the outpatient setting in 4 weeks and we will plan to keep him in the collar for 8 weeks at this time.\"  "

## 2020-06-24 PROCEDURE — 0298T HOLTER MONITOR - 72 HOUR UP TO 21 DAY: CPT | Performed by: INTERNAL MEDICINE

## 2020-07-15 ENCOUNTER — LAB REQUISITION (OUTPATIENT)
Dept: LAB | Facility: HOSPITAL | Age: 65
End: 2020-07-15

## 2020-07-15 DIAGNOSIS — Z00.00 ROUTINE GENERAL MEDICAL EXAMINATION AT A HEALTH CARE FACILITY: ICD-10-CM

## 2020-07-15 LAB
ALBUMIN SERPL-MCNC: 3.6 G/DL (ref 3.5–5.2)
ALBUMIN/GLOB SERPL: 1.8 G/DL
ALP SERPL-CCNC: 53 U/L (ref 39–117)
ALT SERPL W P-5'-P-CCNC: 25 U/L (ref 1–41)
ANION GAP SERPL CALCULATED.3IONS-SCNC: 10 MMOL/L (ref 5–15)
AST SERPL-CCNC: 21 U/L (ref 1–40)
BILIRUB SERPL-MCNC: 0.2 MG/DL (ref 0–1.2)
BUN SERPL-MCNC: 53 MG/DL (ref 8–23)
BUN/CREAT SERPL: 49.5 (ref 7–25)
CALCIUM SPEC-SCNC: 9.5 MG/DL (ref 8.6–10.5)
CHLORIDE SERPL-SCNC: 110 MMOL/L (ref 98–107)
CO2 SERPL-SCNC: 32 MMOL/L (ref 22–29)
CREAT SERPL-MCNC: 1.07 MG/DL (ref 0.76–1.27)
GFR SERPL CREATININE-BSD FRML MDRD: 70 ML/MIN/1.73
GLOBULIN UR ELPH-MCNC: 2 GM/DL
GLUCOSE SERPL-MCNC: 164 MG/DL (ref 65–99)
POTASSIUM SERPL-SCNC: 4.2 MMOL/L (ref 3.5–5.2)
PROT SERPL-MCNC: 5.6 G/DL (ref 6–8.5)
SODIUM SERPL-SCNC: 152 MMOL/L (ref 136–145)

## 2020-07-15 PROCEDURE — 80053 COMPREHEN METABOLIC PANEL: CPT

## 2020-11-08 ENCOUNTER — HOSPITAL ENCOUNTER (INPATIENT)
Facility: HOSPITAL | Age: 65
LOS: 12 days | Discharge: NURSING, REHAB OR LONG-TERM CARE | End: 2020-11-20
Attending: EMERGENCY MEDICINE | Admitting: INTERNAL MEDICINE

## 2020-11-08 ENCOUNTER — APPOINTMENT (OUTPATIENT)
Dept: CT IMAGING | Facility: HOSPITAL | Age: 65
End: 2020-11-08

## 2020-11-08 DIAGNOSIS — R00.1 BRADYCARDIA: ICD-10-CM

## 2020-11-08 DIAGNOSIS — F03.91 DEMENTIA WITH BEHAVIORAL DISTURBANCE, UNSPECIFIED DEMENTIA TYPE: ICD-10-CM

## 2020-11-08 DIAGNOSIS — S00.83XA CONTUSION OF FACE, INITIAL ENCOUNTER: ICD-10-CM

## 2020-11-08 DIAGNOSIS — S22.42XS CLOSED FRACTURE OF MULTIPLE RIBS OF LEFT SIDE, SEQUELA: ICD-10-CM

## 2020-11-08 DIAGNOSIS — I63.9 CEREBROVASCULAR ACCIDENT (CVA), UNSPECIFIED MECHANISM (HCC): ICD-10-CM

## 2020-11-08 DIAGNOSIS — I16.0 MALIGNANT HYPERTENSIVE URGENCY: Primary | ICD-10-CM

## 2020-11-08 LAB
ALBUMIN SERPL-MCNC: 3.6 G/DL (ref 3.5–5.2)
ALBUMIN/GLOB SERPL: 1.4 G/DL
ALP SERPL-CCNC: 49 U/L (ref 39–117)
ALT SERPL W P-5'-P-CCNC: 15 U/L (ref 1–41)
ANION GAP SERPL CALCULATED.3IONS-SCNC: 5.9 MMOL/L (ref 5–15)
AST SERPL-CCNC: 18 U/L (ref 1–40)
BASOPHILS # BLD AUTO: 0.04 10*3/MM3 (ref 0–0.2)
BASOPHILS NFR BLD AUTO: 0.5 % (ref 0–1.5)
BILIRUB SERPL-MCNC: <0.2 MG/DL (ref 0–1.2)
BUN SERPL-MCNC: 20 MG/DL (ref 8–23)
BUN/CREAT SERPL: 24.4 (ref 7–25)
CALCIUM SPEC-SCNC: 8.6 MG/DL (ref 8.6–10.5)
CHLORIDE SERPL-SCNC: 93 MMOL/L (ref 98–107)
CO2 SERPL-SCNC: 32.1 MMOL/L (ref 22–29)
CREAT SERPL-MCNC: 0.82 MG/DL (ref 0.76–1.27)
DEPRECATED RDW RBC AUTO: 40.8 FL (ref 37–54)
EOSINOPHIL # BLD AUTO: 0.08 10*3/MM3 (ref 0–0.4)
EOSINOPHIL NFR BLD AUTO: 1 % (ref 0.3–6.2)
ERYTHROCYTE [DISTWIDTH] IN BLOOD BY AUTOMATED COUNT: 11.7 % (ref 12.3–15.4)
GFR SERPL CREATININE-BSD FRML MDRD: 95 ML/MIN/1.73
GLOBULIN UR ELPH-MCNC: 2.6 GM/DL
GLUCOSE SERPL-MCNC: 95 MG/DL (ref 65–99)
HCT VFR BLD AUTO: 35 % (ref 37.5–51)
HGB BLD-MCNC: 12 G/DL (ref 13–17.7)
HOLD SPECIMEN: NORMAL
HOLD SPECIMEN: NORMAL
IMM GRANULOCYTES # BLD AUTO: 0.01 10*3/MM3 (ref 0–0.05)
IMM GRANULOCYTES NFR BLD AUTO: 0.1 % (ref 0–0.5)
LYMPHOCYTES # BLD AUTO: 1.74 10*3/MM3 (ref 0.7–3.1)
LYMPHOCYTES NFR BLD AUTO: 22.2 % (ref 19.6–45.3)
MCH RBC QN AUTO: 32.8 PG (ref 26.6–33)
MCHC RBC AUTO-ENTMCNC: 34.3 G/DL (ref 31.5–35.7)
MCV RBC AUTO: 95.6 FL (ref 79–97)
MONOCYTES # BLD AUTO: 0.81 10*3/MM3 (ref 0.1–0.9)
MONOCYTES NFR BLD AUTO: 10.3 % (ref 5–12)
NEUTROPHILS NFR BLD AUTO: 5.17 10*3/MM3 (ref 1.7–7)
NEUTROPHILS NFR BLD AUTO: 65.9 % (ref 42.7–76)
NRBC BLD AUTO-RTO: 0.1 /100 WBC (ref 0–0.2)
PLATELET # BLD AUTO: 207 10*3/MM3 (ref 140–450)
PMV BLD AUTO: 8.9 FL (ref 6–12)
POTASSIUM SERPL-SCNC: 5.1 MMOL/L (ref 3.5–5.2)
PROT SERPL-MCNC: 6.2 G/DL (ref 6–8.5)
RBC # BLD AUTO: 3.66 10*6/MM3 (ref 4.14–5.8)
SODIUM SERPL-SCNC: 131 MMOL/L (ref 136–145)
VALPROATE SERPL-MCNC: 58 MCG/ML (ref 50–125)
WBC # BLD AUTO: 7.85 10*3/MM3 (ref 3.4–10.8)
WHOLE BLOOD HOLD SPECIMEN: NORMAL
WHOLE BLOOD HOLD SPECIMEN: NORMAL

## 2020-11-08 PROCEDURE — 25010000002 TDAP 5-2.5-18.5 LF-MCG/0.5 SUSPENSION: Performed by: EMERGENCY MEDICINE

## 2020-11-08 PROCEDURE — 80053 COMPREHEN METABOLIC PANEL: CPT | Performed by: EMERGENCY MEDICINE

## 2020-11-08 PROCEDURE — 90715 TDAP VACCINE 7 YRS/> IM: CPT | Performed by: EMERGENCY MEDICINE

## 2020-11-08 PROCEDURE — 93005 ELECTROCARDIOGRAM TRACING: CPT | Performed by: EMERGENCY MEDICINE

## 2020-11-08 PROCEDURE — 72125 CT NECK SPINE W/O DYE: CPT

## 2020-11-08 PROCEDURE — 99285 EMERGENCY DEPT VISIT HI MDM: CPT

## 2020-11-08 PROCEDURE — 80164 ASSAY DIPROPYLACETIC ACD TOT: CPT | Performed by: EMERGENCY MEDICINE

## 2020-11-08 PROCEDURE — 90471 IMMUNIZATION ADMIN: CPT | Performed by: EMERGENCY MEDICINE

## 2020-11-08 PROCEDURE — 0202U NFCT DS 22 TRGT SARS-COV-2: CPT | Performed by: EMERGENCY MEDICINE

## 2020-11-08 PROCEDURE — 99284 EMERGENCY DEPT VISIT MOD MDM: CPT

## 2020-11-08 PROCEDURE — 70450 CT HEAD/BRAIN W/O DYE: CPT

## 2020-11-08 PROCEDURE — 93010 ELECTROCARDIOGRAM REPORT: CPT | Performed by: INTERNAL MEDICINE

## 2020-11-08 PROCEDURE — 85025 COMPLETE CBC W/AUTO DIFF WBC: CPT

## 2020-11-08 RX ORDER — CHOLECALCIFEROL (VITAMIN D3) 125 MCG
5 CAPSULE ORAL
COMMUNITY
End: 2020-11-20 | Stop reason: HOSPADM

## 2020-11-08 RX ORDER — SODIUM CHLORIDE 0.9 % (FLUSH) 0.9 %
10 SYRINGE (ML) INJECTION AS NEEDED
Status: DISCONTINUED | OUTPATIENT
Start: 2020-11-08 | End: 2020-11-20 | Stop reason: HOSPADM

## 2020-11-08 RX ADMIN — SODIUM CHLORIDE, PRESERVATIVE FREE 10 ML: 5 INJECTION INTRAVENOUS at 18:49

## 2020-11-08 RX ADMIN — SODIUM CHLORIDE 5 MG/HR: 9 INJECTION, SOLUTION INTRAVENOUS at 20:49

## 2020-11-08 RX ADMIN — TETANUS TOXOID, REDUCED DIPHTHERIA TOXOID AND ACELLULAR PERTUSSIS VACCINE, ADSORBED 0.5 ML: 5; 2.5; 8; 8; 2.5 SUSPENSION INTRAMUSCULAR at 19:43

## 2020-11-08 NOTE — ED NOTES
[facility] from [facility], fell OOB, lac to lip, bleeding controlled. Baseline dementia. No LOC.     Pt placed in mask at triage, all staff wearing appropriate ppe        Sarah Troy RN  11/08/20 6715

## 2020-11-09 PROBLEM — E43 SEVERE MALNUTRITION (HCC): Status: ACTIVE | Noted: 2020-11-09

## 2020-11-09 LAB
ANION GAP SERPL CALCULATED.3IONS-SCNC: 7.6 MMOL/L (ref 5–15)
B PARAPERT DNA SPEC QL NAA+PROBE: NOT DETECTED
B PERT DNA SPEC QL NAA+PROBE: NOT DETECTED
BACTERIA UR QL AUTO: ABNORMAL /HPF
BILIRUB UR QL STRIP: NEGATIVE
BUN SERPL-MCNC: 16 MG/DL (ref 8–23)
BUN/CREAT SERPL: 26.7 (ref 7–25)
C PNEUM DNA NPH QL NAA+NON-PROBE: NOT DETECTED
CALCIUM SPEC-SCNC: 8.6 MG/DL (ref 8.6–10.5)
CHLORIDE SERPL-SCNC: 94 MMOL/L (ref 98–107)
CLARITY UR: ABNORMAL
CO2 SERPL-SCNC: 28.4 MMOL/L (ref 22–29)
COLOR UR: ABNORMAL
CREAT SERPL-MCNC: 0.6 MG/DL (ref 0.76–1.27)
DEPRECATED RDW RBC AUTO: 41.6 FL (ref 37–54)
ERYTHROCYTE [DISTWIDTH] IN BLOOD BY AUTOMATED COUNT: 11.8 % (ref 12.3–15.4)
FLUAV SUBTYP SPEC NAA+PROBE: NOT DETECTED
FLUBV RNA ISLT QL NAA+PROBE: NOT DETECTED
GFR SERPL CREATININE-BSD FRML MDRD: 136 ML/MIN/1.73
GLUCOSE SERPL-MCNC: 88 MG/DL (ref 65–99)
GLUCOSE UR STRIP-MCNC: NEGATIVE MG/DL
HADV DNA SPEC NAA+PROBE: NOT DETECTED
HBA1C MFR BLD: 5.18 % (ref 4.8–5.6)
HCOV 229E RNA SPEC QL NAA+PROBE: NOT DETECTED
HCOV HKU1 RNA SPEC QL NAA+PROBE: NOT DETECTED
HCOV NL63 RNA SPEC QL NAA+PROBE: NOT DETECTED
HCOV OC43 RNA SPEC QL NAA+PROBE: NOT DETECTED
HCT VFR BLD AUTO: 34.8 % (ref 37.5–51)
HGB BLD-MCNC: 12 G/DL (ref 13–17.7)
HGB UR QL STRIP.AUTO: ABNORMAL
HMPV RNA NPH QL NAA+NON-PROBE: NOT DETECTED
HPIV1 RNA SPEC QL NAA+PROBE: NOT DETECTED
HPIV2 RNA SPEC QL NAA+PROBE: NOT DETECTED
HPIV3 RNA NPH QL NAA+PROBE: NOT DETECTED
HPIV4 P GENE NPH QL NAA+PROBE: NOT DETECTED
KETONES UR QL STRIP: ABNORMAL
LEUKOCYTE ESTERASE UR QL STRIP.AUTO: ABNORMAL
M PNEUMO IGG SER IA-ACNC: NOT DETECTED
MCH RBC QN AUTO: 33.2 PG (ref 26.6–33)
MCHC RBC AUTO-ENTMCNC: 34.5 G/DL (ref 31.5–35.7)
MCV RBC AUTO: 96.4 FL (ref 79–97)
NITRITE UR QL STRIP: NEGATIVE
PH UR STRIP.AUTO: 6.5 [PH] (ref 5–8)
PLATELET # BLD AUTO: 217 10*3/MM3 (ref 140–450)
PMV BLD AUTO: 9 FL (ref 6–12)
POTASSIUM SERPL-SCNC: 4.4 MMOL/L (ref 3.5–5.2)
PROT UR QL STRIP: ABNORMAL
QT INTERVAL: 494 MS
RBC # BLD AUTO: 3.61 10*6/MM3 (ref 4.14–5.8)
RBC # UR: ABNORMAL /HPF
REF LAB TEST METHOD: ABNORMAL
RHINOVIRUS RNA SPEC NAA+PROBE: NOT DETECTED
RSV RNA NPH QL NAA+NON-PROBE: NOT DETECTED
SARS-COV-2 RNA NPH QL NAA+NON-PROBE: NOT DETECTED
SODIUM SERPL-SCNC: 130 MMOL/L (ref 136–145)
SP GR UR STRIP: 1.02 (ref 1–1.03)
SQUAMOUS #/AREA URNS HPF: ABNORMAL /HPF
UROBILINOGEN UR QL STRIP: ABNORMAL
WBC # BLD AUTO: 10.22 10*3/MM3 (ref 3.4–10.8)
WBC UR QL AUTO: ABNORMAL /HPF

## 2020-11-09 PROCEDURE — 87077 CULTURE AEROBIC IDENTIFY: CPT | Performed by: INTERNAL MEDICINE

## 2020-11-09 PROCEDURE — 80048 BASIC METABOLIC PNL TOTAL CA: CPT | Performed by: INTERNAL MEDICINE

## 2020-11-09 PROCEDURE — 83036 HEMOGLOBIN GLYCOSYLATED A1C: CPT | Performed by: INTERNAL MEDICINE

## 2020-11-09 PROCEDURE — 85027 COMPLETE CBC AUTOMATED: CPT | Performed by: INTERNAL MEDICINE

## 2020-11-09 PROCEDURE — 87086 URINE CULTURE/COLONY COUNT: CPT | Performed by: INTERNAL MEDICINE

## 2020-11-09 PROCEDURE — 92610 EVALUATE SWALLOWING FUNCTION: CPT

## 2020-11-09 PROCEDURE — 87186 SC STD MICRODIL/AGAR DIL: CPT | Performed by: INTERNAL MEDICINE

## 2020-11-09 PROCEDURE — 81001 URINALYSIS AUTO W/SCOPE: CPT | Performed by: INTERNAL MEDICINE

## 2020-11-09 RX ORDER — OXCARBAZEPINE 300 MG/1
600 TABLET, FILM COATED ORAL EVERY 12 HOURS SCHEDULED
Status: DISCONTINUED | OUTPATIENT
Start: 2020-11-09 | End: 2020-11-19

## 2020-11-09 RX ORDER — SODIUM CHLORIDE 9 MG/ML
75 INJECTION, SOLUTION INTRAVENOUS CONTINUOUS
Status: DISCONTINUED | OUTPATIENT
Start: 2020-11-09 | End: 2020-11-11

## 2020-11-09 RX ORDER — TAMSULOSIN HYDROCHLORIDE 0.4 MG/1
0.4 CAPSULE ORAL NIGHTLY
Status: DISCONTINUED | OUTPATIENT
Start: 2020-11-09 | End: 2020-11-20 | Stop reason: HOSPADM

## 2020-11-09 RX ORDER — AMLODIPINE BESYLATE 5 MG/1
5 TABLET ORAL
Status: DISCONTINUED | OUTPATIENT
Start: 2020-11-09 | End: 2020-11-12

## 2020-11-09 RX ORDER — ATORVASTATIN CALCIUM 20 MG/1
20 TABLET, FILM COATED ORAL DAILY
Status: DISCONTINUED | OUTPATIENT
Start: 2020-11-09 | End: 2020-11-20 | Stop reason: HOSPADM

## 2020-11-09 RX ORDER — NICOTINE POLACRILEX 4 MG
15 LOZENGE BUCCAL
Status: DISCONTINUED | OUTPATIENT
Start: 2020-11-09 | End: 2020-11-20 | Stop reason: HOSPADM

## 2020-11-09 RX ORDER — DIVALPROEX SODIUM 250 MG/1
250 TABLET, DELAYED RELEASE ORAL 3 TIMES DAILY
Status: DISCONTINUED | OUTPATIENT
Start: 2020-11-09 | End: 2020-11-17

## 2020-11-09 RX ORDER — ASPIRIN 81 MG/1
81 TABLET ORAL DAILY
Status: DISCONTINUED | OUTPATIENT
Start: 2020-11-09 | End: 2020-11-18

## 2020-11-09 RX ORDER — LISINOPRIL 5 MG/1
5 TABLET ORAL NIGHTLY
Status: DISCONTINUED | OUTPATIENT
Start: 2020-11-09 | End: 2020-11-12

## 2020-11-09 RX ORDER — TRIHEXYPHENIDYL HYDROCHLORIDE 2 MG/1
2 TABLET ORAL 2 TIMES DAILY WITH MEALS
Status: DISCONTINUED | OUTPATIENT
Start: 2020-11-09 | End: 2020-11-09

## 2020-11-09 RX ORDER — CHOLECALCIFEROL (VITAMIN D3) 125 MCG
5 CAPSULE ORAL NIGHTLY
Status: DISCONTINUED | OUTPATIENT
Start: 2020-11-09 | End: 2020-11-17

## 2020-11-09 RX ORDER — DEXTROSE MONOHYDRATE 25 G/50ML
25 INJECTION, SOLUTION INTRAVENOUS
Status: DISCONTINUED | OUTPATIENT
Start: 2020-11-09 | End: 2020-11-20 | Stop reason: HOSPADM

## 2020-11-09 RX ORDER — PAROXETINE 10 MG/1
10 TABLET, FILM COATED ORAL EVERY MORNING
Status: DISCONTINUED | OUTPATIENT
Start: 2020-11-09 | End: 2020-11-11

## 2020-11-09 RX ORDER — OLANZAPINE 2.5 MG/1
2.5 TABLET ORAL NIGHTLY
Status: DISCONTINUED | OUTPATIENT
Start: 2020-11-09 | End: 2020-11-09

## 2020-11-09 RX ORDER — ACETAMINOPHEN 325 MG/1
650 TABLET ORAL EVERY 6 HOURS PRN
Status: DISCONTINUED | OUTPATIENT
Start: 2020-11-09 | End: 2020-11-20 | Stop reason: HOSPADM

## 2020-11-09 RX ADMIN — TAMSULOSIN HYDROCHLORIDE 0.4 MG: 0.4 CAPSULE ORAL at 22:47

## 2020-11-09 RX ADMIN — DIVALPROEX SODIUM 250 MG: 250 TABLET, DELAYED RELEASE ORAL at 22:47

## 2020-11-09 RX ADMIN — LISINOPRIL 5 MG: 5 TABLET ORAL at 22:47

## 2020-11-09 RX ADMIN — OXCARBAZEPINE 600 MG: 300 TABLET, FILM COATED ORAL at 10:20

## 2020-11-09 RX ADMIN — ACETAMINOPHEN 650 MG: 325 TABLET, FILM COATED ORAL at 12:54

## 2020-11-09 RX ADMIN — TAMSULOSIN HYDROCHLORIDE 0.4 MG: 0.4 CAPSULE ORAL at 05:37

## 2020-11-09 RX ADMIN — DIVALPROEX SODIUM 250 MG: 250 TABLET, DELAYED RELEASE ORAL at 10:19

## 2020-11-09 RX ADMIN — SODIUM CHLORIDE, PRESERVATIVE FREE 10 ML: 5 INJECTION INTRAVENOUS at 09:47

## 2020-11-09 RX ADMIN — AMLODIPINE BESYLATE 5 MG: 5 TABLET ORAL at 09:41

## 2020-11-09 RX ADMIN — OXCARBAZEPINE 600 MG: 300 TABLET, FILM COATED ORAL at 22:47

## 2020-11-09 RX ADMIN — ATORVASTATIN CALCIUM 20 MG: 20 TABLET, FILM COATED ORAL at 09:41

## 2020-11-09 RX ADMIN — DIVALPROEX SODIUM 250 MG: 250 TABLET, DELAYED RELEASE ORAL at 18:59

## 2020-11-09 RX ADMIN — SODIUM CHLORIDE 75 ML/HR: 9 INJECTION, SOLUTION INTRAVENOUS at 09:41

## 2020-11-09 RX ADMIN — TRIHEXYPHENIDYL HYDROCHLORIDE 2 MG: 2 TABLET ORAL at 10:18

## 2020-11-09 RX ADMIN — PAROXETINE HYDROCHLORIDE HEMIHYDRATE 10 MG: 10 TABLET, FILM COATED ORAL at 10:19

## 2020-11-09 RX ADMIN — TRIHEXYPHENIDYL HYDROCHLORIDE 2 MG: 2 TABLET ORAL at 18:59

## 2020-11-09 RX ADMIN — SODIUM CHLORIDE 75 ML/HR: 9 INJECTION, SOLUTION INTRAVENOUS at 22:48

## 2020-11-09 RX ADMIN — Medication 5 MG: at 22:46

## 2020-11-09 RX ADMIN — OLANZAPINE 2.5 MG: 2.5 TABLET ORAL at 05:37

## 2020-11-09 RX ADMIN — LISINOPRIL 5 MG: 5 TABLET ORAL at 05:35

## 2020-11-09 RX ADMIN — ASPIRIN 81 MG: 81 TABLET, FILM COATED ORAL at 09:41

## 2020-11-09 NOTE — ED PROVIDER NOTES
EMERGENCY DEPARTMENT ENCOUNTER    Room Number:  17/17  Date of encounter:  11/8/2020  PCP: George Ware MD  Historian: Nursing home staff      HPI:  Chief Complaint: Patient fell out of bed that was unwitnessed.  A complete HPI/ROS/PMH/PSH/SH/FH are unobtainable due to: Patient has advanced dementia is unable to provide any history.  According to nursing home staff this is the patient's baseline.  Context: Trever Petit is a 64 y.o. male who presents to the ED c/o found next to the bed.  Assumed fell out of bed.  He does have a history of a seizure disorder and no definitive seizure seen or postictal.  Appreciated.  Patient was found next the bed had a injury to his mouth.  Sent him here to the emergency department for further evaluation.  Patient is unable to provide any history.  He has advanced dementia.  He is unaware that he is in a nursing home, unaware of his name, unaware that he is currently in the hospital at this time.        Previous Episodes: When you look at old records patient had a similar visit to the emergency department and admission in May 2020.  Current Symptoms: Patient appears comfortable.    MEDICAL HISTORY REVIEWED  Patient was admitted to the hospital to [facility] before Dr. Elder was this patient in May 2020.  He is admitted for a fall with a C1 fracture, bradycardia and high blood pressure.  I reviewed the hospital course.  Please see copy of the hospital course below.  Tesfaye note that he has had bradycardia before.  And has a cardia evaluation  Mr. Petit is a 64 y.o. male with a history of advanced dementia, DM2 and possible seizure disorder who presented to [facility] initially complaining of a fall and had been fund down at [facility].  Please see the admitting H&P for further details.  He was found to have a C1 fracture and was admitted to the hospital for further evaluation and treatment.  The patient was admitted and kept on bedrest until neurosurgery saw  him.  Dr. Bush recommends hard collar and reevaluation in 4 weeks with no surgical intervention indicated at this time.  The patient had some significant bradycardia and cardiology was consulted.  On review of records it looks like he has had this before and had previously been taken off Toprol several months ago.  He is currently not on any AV rita blocking agents and tends to run a sinus bradycardia in the 40s.  He did not have any significant pauses while here.  Cardiology did not recommend a pacemaker at this time and will be placing a ZIO patch for discharge.  He was noted have a possible UTI and cultures grew enterococcus so after being initially treated with Rocephin this was switched to Macrobid which he has completed.  He did have some significant agitation initially and actually required restraints for a brief time.  But this has resolved and he has been quite calm over the last 2 days.  He is stable for discharge back to his skilled nursing facility at this time.  Blood pressure was quite elevated at the time of the agitation but that has also resolved and his meds have been decreased some.  Obviously would hold any further AV rita blocking agents now or in the future.  Also his glucoses have been tightly controlled.  His A1c is less than 6 and he should not be on any diabetic medications at this time.  Hypoglycemia would certainly put him at risk for further potential falls, altered mental status or other complications    PAST MEDICAL HISTORY  Active Ambulatory Problems     Diagnosis Date Noted   • Hypertensive emergency 06/21/2019   • Essential hypertension 06/25/2019   • Stroke (CMS/[facility]) 07/02/2019   • Hypoglycemia 08/25/2019   • Vascular dementia (CMS/[facility]) 08/25/2019   • Type 2 diabetes mellitus, without long-term current use of insulin (CMS/[facility]) 08/25/2019   • Constipation 08/28/2019   • Leukocytosis 08/28/2019   • Fever 08/28/2019   • Closed fracture of multiple ribs of left side 08/29/2019   •  Pleural effusion 08/30/2019   • Left lower lobe pneumonia 08/30/2019   • Cystitis 09/25/2019   • Seizure-like activity (CMS/HCC) 09/25/2019   • E. coli UTI (urinary tract infection) 09/26/2019   • Fall 05/23/2020   • C1 cervical fracture (CMS/[facility]) 05/23/2020   • Bradycardia 05/27/2020     Resolved Ambulatory Problems     Diagnosis Date Noted   • JENI (acute kidney injury) (CMS/HCC) 09/25/2019   • UTI (urinary tract infection), bacterial 05/23/2020   • Metabolic encephalopathy 05/27/2020     Past Medical History:   Diagnosis Date   • Anxiety    • Arthritis    • Carpal bone fracture    • Carpal tunnel syndrome    • Cerebrovascular accident (CMS/HCC)    • COPD (chronic obstructive pulmonary disease) (CMS/HCC)    • Coronary artery disease    • Diabetes mellitus (CMS/HCC)    • Elevated cholesterol    • Hepatitis B    • Hepatitis-C    • Hypertension    • Myocardial infarction (CMS/HCC)    • Neuropathy    • Spinal stenosis          PAST SURGICAL HISTORY  Past Surgical History:   Procedure Laterality Date   • CARDIAC CATHETERIZATION N/A 6/25/2019    Procedure: Left Heart Cath;  Surgeon: Chen Aguilar MD;  Location:  [facility];  Service: Cardiovascular   • CARDIAC CATHETERIZATION N/A 6/25/2019    Procedure: Left ventriculography;  Surgeon: Chen Aguilar MD;  Location:  [facility];  Service: Cardiovascular   • CARDIAC CATHETERIZATION N/A 6/25/2019    Procedure: Coronary angiography;  Surgeon: Chen Aguilar MD;  Location:  [facility];  Service: Cardiovascular   • CARPAL TUNNEL RELEASE     • CERVICAL FUSION     • NECK SURGERY           FAMILY HISTORY  History reviewed. No pertinent family history.      SOCIAL HISTORY  Social History     Socioeconomic History   • Marital status: Single     Spouse name: Not on file   • Number of children: Not on file   • Years of education: Not on file   • Highest education level: Not on file   Tobacco Use   • Smoking status: Never  Smoker   Substance and Sexual Activity   • Alcohol use: No     Frequency: Never   • Drug use: Yes     Types: Marijuana   • Sexual activity: Defer         ALLERGIES  Clonidine derivatives, Hydrochlorothiazide, Metformin, and Sitagliptin        REVIEW OF SYSTEMS  Review of Systems     All systems reviewed and negative except for those discussed in HPI.       PHYSICAL EXAM    I have reviewed the triage vital signs and nursing notes.    ED Triage Vitals [11/08/20 1817]   Temp Heart Rate Resp BP SpO2   98 °F (36.7 °C) (!) 46 14 (!) 199/94 97 %      Temp src Heart Rate Source Patient Position BP Location FiO2 (%)   Oral -- -- -- --       GENERAL: Elderly male that appears older than his stated age.  He is chronically ill.  No acute distress.Vital signs on my initial evaluation he has had several readings of significant hypertension in the low 200s systolic in low 100s diastolic.  He also has bradycardia that is wide-complex on the monitor.  He does not appear to be in distress.  HENT: nares patent  Head/neck/ face are symmetric without gross deformity.  He does have intraorally and the left aspect of his chin he has a contusion and abrasion.  Intraorally he has diffuse poor dentition.  I do not appreciate any obvious acute fracture and there is no obvious bleeding from the gum from a previous dental site.  He has a laceration intraorally at the frenulum and contusion.  It is well approximated and no suturing is needed.  No active bleeding.  This laceration is superficial and has no appearance of going deeper going through his skin.  On the external aspect of his oral exam he has contusion and swelling to his inferior aspect of his left lip he has a small superficial abrasion to his chin.  There is no laceration.  No suturing needed.  EYES: no scleral icterus, no conjunctival pallor.  NECK: Supple, no meningismus.  No obvious pain on palpation.  CV: Patient is bradycardic.  No murmur or rub appreciated  RESPIRATORY: normal  effort and no respiratory distress.  Clear to auscultation bilaterally  ABDOMEN: soft and non-tender.  Patient has normal bowel sounds.  Patient has a Cuellar in place.  MUSCULOSKELETAL: no deformity.  No edema.  Patient has muscle wasting and contractures to extremities most prominent to his lower extremities.  He is able to move his upper extremities bilaterally and has a very little minimal movement to his lower extremities.  NEURO: alert and able to respond to questions.  He will follow limited commands.  He is disoriented to his name, age, place, time.  I do not appreciate any new focal neurologic deficit.  Patient has severe memory impairment.  SKIN: warm, dry    Vital signs and nursing notes reviewed.        LAB RESULTS  Recent Results (from the past 24 hour(s))   Valproic Acid Level, Total    Collection Time: 11/08/20  6:49 PM    Specimen: Arm, Right; Blood   Result Value Ref Range    Valproic Acid 58.0 50.0 - 125.0 mcg/mL   Comprehensive Metabolic Panel    Collection Time: 11/08/20  6:49 PM    Specimen: Arm, Right; Blood   Result Value Ref Range    Glucose 95 65 - 99 mg/dL    BUN 20 8 - 23 mg/dL    Creatinine 0.82 0.76 - 1.27 mg/dL    Sodium 131 (L) 136 - 145 mmol/L    Potassium 5.1 3.5 - 5.2 mmol/L    Chloride 93 (L) 98 - 107 mmol/L    CO2 32.1 (H) 22.0 - 29.0 mmol/L    Calcium 8.6 8.6 - 10.5 mg/dL    Total Protein 6.2 6.0 - 8.5 g/dL    Albumin 3.60 3.50 - 5.20 g/dL    ALT (SGPT) 15 1 - 41 U/L    AST (SGOT) 18 1 - 40 U/L    Alkaline Phosphatase 49 39 - 117 U/L    Total Bilirubin <0.2 0.0 - 1.2 mg/dL    eGFR Non African Amer 95 >60 mL/min/1.73    Globulin 2.6 gm/dL    A/G Ratio 1.4 g/dL    BUN/Creatinine Ratio 24.4 7.0 - 25.0    Anion Gap 5.9 5.0 - 15.0 mmol/L   Light Blue Top    Collection Time: 11/08/20  6:49 PM   Result Value Ref Range    Extra Tube hold for add-on    Green Top (Gel)    Collection Time: 11/08/20  6:49 PM   Result Value Ref Range    Extra Tube Hold for add-ons.    Lavender Top    Collection  Time: 11/08/20  6:49 PM   Result Value Ref Range    Extra Tube hold for add-on    Gold Top - SST    Collection Time: 11/08/20  6:49 PM   Result Value Ref Range    Extra Tube Hold for add-ons.    CBC Auto Differential    Collection Time: 11/08/20  6:49 PM    Specimen: Arm, Right; Blood   Result Value Ref Range    WBC 7.85 3.40 - 10.80 10*3/mm3    RBC 3.66 (L) 4.14 - 5.80 10*6/mm3    Hemoglobin 12.0 (L) 13.0 - 17.7 g/dL    Hematocrit 35.0 (L) 37.5 - 51.0 %    MCV 95.6 79.0 - 97.0 fL    MCH 32.8 26.6 - 33.0 pg    MCHC 34.3 31.5 - 35.7 g/dL    RDW 11.7 (L) 12.3 - 15.4 %    RDW-SD 40.8 37.0 - 54.0 fl    MPV 8.9 6.0 - 12.0 fL    Platelets 207 140 - 450 10*3/mm3    Neutrophil % 65.9 42.7 - 76.0 %    Lymphocyte % 22.2 19.6 - 45.3 %    Monocyte % 10.3 5.0 - 12.0 %    Eosinophil % 1.0 0.3 - 6.2 %    Basophil % 0.5 0.0 - 1.5 %    Immature Grans % 0.1 0.0 - 0.5 %    Neutrophils, Absolute 5.17 1.70 - 7.00 10*3/mm3    Lymphocytes, Absolute 1.74 0.70 - 3.10 10*3/mm3    Monocytes, Absolute 0.81 0.10 - 0.90 10*3/mm3    Eosinophils, Absolute 0.08 0.00 - 0.40 10*3/mm3    Basophils, Absolute 0.04 0.00 - 0.20 10*3/mm3    Immature Grans, Absolute 0.01 0.00 - 0.05 10*3/mm3    nRBC 0.1 0.0 - 0.2 /100 WBC   ECG 12 Lead    Collection Time: 11/08/20  7:46 PM   Result Value Ref Range    QT Interval 494 ms       Ordered the above labs and independently reviewed the results.        RADIOLOGY  Ct Head Without Contrast    Result Date: 11/8/2020  CT HEAD WITHOUT CONTRAST:  HISTORY:  Fall out of bed from nursing home.  TECHNIQUE:  Axial images were obtained through the brain without contrast administration. Multiplanar reformatted images were reconstructed from the helical source data. Radiation dose reduction techniques were utilized, including automated exposure control and exposure modulation based on body size.   COMPARISON: 05/23/2020.  FINDINGS: Extensive encephalomalacia throughout the right hemisphere from prior right MCA infarction.  Stable  ex vacuo dilatation of the right lateral ventricle.  Stable moderate cerebral volume loss with proportionate prominence of the remaining ventricular system and sulci. No hydrocephalus or midline shift.  Stable moderate scattered hypodensity throughout the periventricular and subcortical white matter that is most likely secondary to chronic microvascular ischemia. There is no evidence of a large territorial infarction. No hemorrhage or extra-axial fluid collection. Intracranial atherosclerotic arterial calcifications.   The orbits are unremarkable. The visualized paranasal sinuses and left mastoid air cells are clear. Moderate opacification of the right mastoid air cells.    The calvarium is intact. The scalp soft tissues are unremarkable.      1.   No acute intracranial abnormality. 2.  Sequela of prior right MCA infarction. 3.  Moderate right mastoid effusion. 4.  Senescent changes.    CERVICAL SPINE CT:  HISTORY:  Follow-up from nursing home.  TECHNIQUE:  Helical axial images were performed from the skull base through the upper thoracic spine without contrast. Sagittal and coronal reformatted images were performed. Radiation dose reduction techniques were utilized, including automated exposure control and exposure modulation based on body size.   COMPARISON:  05/23/2020.  FINDINGS:  Chronic fracture of the left C1 ring with healing along the anterior aspect and nonunion at the posterior aspect. No evidence of acute fracture. Stable fusion hardware at C4-C7 without hardware complication. Straightening of the normal cervical lordosis. Stable multilevel spondylosis. No high-grade spinal canal narrowing. The paravertebral soft tissues are normal. The lung apices are clear.  IMPRESSION: 1.  No evidence of acute fracture. 2.  Chronic left C1 fracture with partial healing and evidence of nonunion. 3.  Stable fusion hardware without competition.  This report was finalized on 11/8/2020 10:28 PM by Dr. Shaheen Olea M.D.       Ct Cervical Spine Without Contrast    Result Date: 11/8/2020  CT HEAD WITHOUT CONTRAST:  HISTORY:  Fall out of bed from nursing home.  TECHNIQUE:  Axial images were obtained through the brain without contrast administration. Multiplanar reformatted images were reconstructed from the helical source data. Radiation dose reduction techniques were utilized, including automated exposure control and exposure modulation based on body size.   COMPARISON: 05/23/2020.  FINDINGS: Extensive encephalomalacia throughout the right hemisphere from prior right MCA infarction.  Stable ex vacuo dilatation of the right lateral ventricle.  Stable moderate cerebral volume loss with proportionate prominence of the remaining ventricular system and sulci. No hydrocephalus or midline shift.  Stable moderate scattered hypodensity throughout the periventricular and subcortical white matter that is most likely secondary to chronic microvascular ischemia. There is no evidence of a large territorial infarction. No hemorrhage or extra-axial fluid collection. Intracranial atherosclerotic arterial calcifications.   The orbits are unremarkable. The visualized paranasal sinuses and left mastoid air cells are clear. Moderate opacification of the right mastoid air cells.    The calvarium is intact. The scalp soft tissues are unremarkable.      1.   No acute intracranial abnormality. 2.  Sequela of prior right MCA infarction. 3.  Moderate right mastoid effusion. 4.  Senescent changes.    CERVICAL SPINE CT:  HISTORY:  Follow-up from nursing home.  TECHNIQUE:  Helical axial images were performed from the skull base through the upper thoracic spine without contrast. Sagittal and coronal reformatted images were performed. Radiation dose reduction techniques were utilized, including automated exposure control and exposure modulation based on body size.   COMPARISON:  05/23/2020.  FINDINGS:  Chronic fracture of the left C1 ring with healing along the  anterior aspect and nonunion at the posterior aspect. No evidence of acute fracture. Stable fusion hardware at C4-C7 without hardware complication. Straightening of the normal cervical lordosis. Stable multilevel spondylosis. No high-grade spinal canal narrowing. The paravertebral soft tissues are normal. The lung apices are clear.  IMPRESSION: 1.  No evidence of acute fracture. 2.  Chronic left C1 fracture with partial healing and evidence of nonunion. 3.  Stable fusion hardware without competition.  This report was finalized on 11/8/2020 10:28 PM by Dr. Shaheen Olea M.D.        I ordered the above noted radiological studies. Reviewed by me and discussed with radiologist.  See dictation for official radiology interpretation.      PROCEDURES    Procedures      MEDICATIONS GIVEN IN ER    Medications   sodium chloride 0.9 % flush 10 mL (10 mL Intravenous Given 11/8/20 1849)   sodium chloride 0.9 % flush 10 mL (has no administration in time range)   niCARdipine (CARDENE) 25 mg in 250 mL NS infusion kit (2.5 mg/hr Intravenous Rate/Dose Change 11/8/20 2109)   Tdap (BOOSTRIX) injection 0.5 mL (0.5 mL Intramuscular Given 11/8/20 1943)         PROGRESS, DATA ANALYSIS, CONSULTS, AND MEDICAL DECISION MAKING    Patient is an elderly male that fell and was found out of bed.  No laceration repair needed to his intraoral area.  He is bradycardic and hypertensive.  Go ahead and start him on some antihypertensive medicine.  I am going avoid beta-blockers due to his degree of bradycardia.  I reviewed old records and he does have a history of bradycardia and his EKG is similar to his previous EKG.  He has a history of seizures but no definitive seizure activity witnessed and no postictal period.  We will check a CT scan of his neck and head.  Recent cervical fracture in May which was treated with a brace and nonoperatively.  We are currently under a pandemic from the COVID19 infection.  The patient presented to the emergency  department by ambulance or personal vehicle. I followed the current protocols required by Infection Control at [facility] in my evaluation and treatment of the patient. The patient was wearing a face mask during my evaluation and throughout my encounter. During my whole encounter with this patient I used appropriate personal protective equipment.  This equipment consisted of eye protection, facemask, gown, and gloves.  I applied this equipment before entering the room.      All labs have been independently reviewed by me.  All radiology studies have been reviewed by me and discussed with radiologist dictating the report.   EKG's independently viewed and interpreted by me.  Discussion below represents my analysis of pertinent findings related to patient's condition, differential diagnosis, treatment plan and final disposition.      ED Course as of Nov 08 2329   Sun Nov 08, 2020 2028 EKG reading done at 1946 EKG shows that he is bradycardic.  I do not see any definitive P waves.  It is wide-complex tach with a right bundle branch block and a left anterior fascicular block.  Got nonspecific J-point elevation in several leadsHe is got some other nonspecific changes.  His QT looks normal when I compared to a previous EKG on May 23, 2020 he was bradycardic like this in the EKG is fairly similar.    [MM]   2031 No change   Hemoglobin(!): 12.0 [MM]   2133 I reviewed old records from the nursing home.  Patient is a DNR per the nursing home records.    [MM]   2327 I spoke with Dr. Elder.  He agrees to admit this patient.    [MM]   2327 She is remained stable in the emergency department with no apparent change in his mental status.  No obvious seizure or has been down here.  Blood pressure is much improved on Cardene.    [MM]      ED Course User Index  [MM] Kapil Goodwin MD       AS OF 23:29 EST VITALS:    BP - 139/98  HR - 59  TEMP - 98 °F (36.7 °C) (Oral)  02 SATS - 97%        DIAGNOSIS  Final diagnoses:    Malignant hypertensive urgency   Bradycardia chronic   Contusion of face, initial encounter   Dementia with behavioral disturbance, unspecified dementia type (CMS/HCC)         DISPOSITION  I have reviewed the test results with my patient and explained the current treatment plan.  I answered all of the patient's questions.  The patient will be admitted to monitor bed at this time.  The patient is not hypotensive and is tolerating their current disease condition well enough for a monitored bed at this time.  The patient's current condition does not require intensive care treatment at this time.             Kapil Goodwin MD  11/08/20 6855

## 2020-11-09 NOTE — DISCHARGE PLACEMENT REQUEST
"Trever Petit (64 y.o. Male)     Date of Birth Social Security Number Address Home Phone MRN    1955  [address] [id number] [phone] [id number]    Caodaism Marital Status          None Single       Admission Date Admission Type Admitting Provider Attending Provider Department, Room/Bed    11/8/20 Emergency George Ware MD [place], Marlon R, MD [address], [address]    Discharge Date Discharge Disposition Discharge Destination                       Attending Provider: George Ware MD    Allergies: Clonidine Derivatives, Hydrochlorothiazide, Metformin, Sitagliptin    Isolation: None   Infection: None   Code Status: Prior    Ht: 172.7 cm (67.99\")   Wt: 55.8 kg (123 lb)    Admission Cmt: None   Principal Problem: None                Active Insurance as of 11/8/2020     Primary Coverage     Payor Plan Insurance Group Employer/Plan Group    [place]      Payor Plan Address Payor Plan Phone Number Payor Plan Fax Number Effective Dates    [address] [phone]  10/2/2019 - None Entered    [place] [id number]       Subscriber Name Subscriber Birth Date Member ID       TREVER PETIT 1955 [id number]                 Emergency Contacts      (Rel.) Home Phone Work Phone Mobile Phone    DamasoLes (Brother) [phone] -- [phone]              "

## 2020-11-09 NOTE — PROGRESS NOTES
Malnutrition Severity Assessment    Patient Name:  Trever Petit  YOB: 1955  MRN: [id number]  Admit Date:  11/8/2020    Patient meets criteria for : Severe Malnutrition      Malnutrition Severity Assessment  Malnutrition Type: Starvation - Related Malnutrition     Malnutrition Type (last 8 hours)      Malnutrition Severity Assessment     Row Name 11/09/20 1053       Malnutrition Severity Assessment    Malnutrition Type  Starvation - Related Malnutrition    Row Name 11/09/20 1053       Insufficient Energy Intake     Insufficient Energy Intake   <75% of est. energy requirement for > or equal to 3 months    Row Name 11/09/20 1053       Unintentional Weight Loss     Unintentional Weight Loss   Weight loss greater than 10% in six months 16% weight loss in 6 months    Row Name 11/09/20 1053       Muscle Loss    Loss of Muscle Mass Findings  Severe per MD note, Patient has muscle wasting and contractures to extremities most prominent to his lower extremities    Row Name 11/09/20 1053       Criteria Met (Must meet criteria for severity in at least 2 of these categories: M Wasting, Fat Loss, Fluid, Secondary Signs, Wt. Status, Intake)    Patient meets criteria for   Severe Malnutrition          Electronically signed by:  Shefali Barcenas RD  11/09/20 10:57 EST

## 2020-11-09 NOTE — SIGNIFICANT NOTE
11/09/20 1447   OTHER   Discipline physical therapist   Rehab Time/Intention   Session Not Performed   (Pt admitted from LTC with severe dementia.  Does not follow any commands during attempt  at PT, does not answer any questions.  DC PT, does not appear appropriate for PT in hospital.  Please re-order if status changes.)

## 2020-11-09 NOTE — THERAPY EVALUATION
Acute Care - Speech Language Pathology   Swallow Initial Evaluation [facility]     Patient Name: Trever Petit  : 1955  MRN: [id number]  Today's Date: 2020               Admit Date: 2020    Visit Dx:     ICD-10-CM ICD-9-CM   1. Malignant hypertensive urgency  I16.0 401.0   2. Bradycardia chronic  R00.1 427.89   3. Contusion of face, initial encounter  S00.83XA 920   4. Dementia with behavioral disturbance, unspecified dementia type (CMS/[facility])  F03.91 294.21     Patient Active Problem List   Diagnosis   • Hypertensive emergency   • Essential hypertension   • Stroke (CMS/[facility])   • Hypoglycemia   • Vascular dementia (CMS/[facility])   • Type 2 diabetes mellitus, without long-term current use of insulin (CMS/[facility])   • Constipation   • Leukocytosis   • Fever   • Closed fracture of multiple ribs of left side   • Pleural effusion   • Left lower lobe pneumonia   • Cystitis   • Seizure-like activity (CMS/[facility])   • E. coli UTI (urinary tract infection)   • Fall   • C1 cervical fracture (CMS/[facility])   • Bradycardia   • Malignant hypertensive urgency     Past Medical History:   Diagnosis Date   • JENI (acute kidney injury) (CMS/[facility]) 2019   • Anxiety    • Arthritis    • Bradycardia    • Carpal bone fracture    • Carpal tunnel syndrome    • Cerebrovascular accident (CMS/[facility])    • Constipation    • COPD (chronic obstructive pulmonary disease) (CMS/[facility])    • Coronary artery disease    • Diabetes mellitus (CMS/[facility])    • E. coli UTI (urinary tract infection) 2019   • Elevated cholesterol    • Hepatitis B    • Hepatitis-C    • Hypertension    • Myocardial infarction (CMS/[facility])    • Neuropathy    • Seizure-like activity (CMS/[facility]) 2019   • Spinal stenosis    • Stroke (CMS/[facility])    • Type 2 diabetes mellitus, without long-term current use of insulin (CMS/[facility]) 2019   • Vascular dementia (CMS/[facility]) 2019     Past Surgical History:   Procedure Laterality Date   • CARDIAC CATHETERIZATION N/A 2019     Procedure: Left Heart Cath;  Surgeon: Chen Aguilar MD;  Location:  [facility];  Service: Cardiovascular   • CARDIAC CATHETERIZATION N/A 6/25/2019    Procedure: Left ventriculography;  Surgeon: Chen Aguilar MD;  Location:  [facility];  Service: Cardiovascular   • CARDIAC CATHETERIZATION N/A 6/25/2019    Procedure: Coronary angiography;  Surgeon: Chen Aguilar MD;  Location:  [facility];  Service: Cardiovascular   • CARPAL TUNNEL RELEASE     • CERVICAL FUSION     • NECK SURGERY          SWALLOW EVALUATION (last 72 hours)      SLP Adult Swallow Evaluation     Row Name 11/09/20 0900                   Rehab Evaluation    Document Type  evaluation  -CP        Subjective Information  no complaints  -CP        Patient Observations  alert;cooperative  -CP        Care Plan Review  evaluation/treatment results reviewed;patient/other agree to care plan  -CP        Care Plan Review, Other Participant(s)  sibling  -CP        Patient Effort  good  -CP        Symptoms Noted During/After Treatment  none  -CP           General Information    Patient Profile Reviewed  yes  -CP        Pertinent History Of Current Problem  Admitted s/p fall from NH, HTN urgency. CT of head and neck negative for acute change. Hx of R MCA CVA and advancing dementia, as well as old C1 fx.   -CP        Current Method of Nutrition  NPO  -CP        Precautions/Limitations, Vision  vision impairment, bilaterally  -CP        Precautions/Limitations, Hearing  WFL  -CP        Prior Level of Function-Communication  cognitive-linguistic impairment;motor speech impairment  -CP        Prior Level of Function-Swallowing  puree;thin liquids;other (see comments) recently downgraded to puree d/t poor intake per brother  -CP        Plans/Goals Discussed with  patient;agreed upon  -CP        Barriers to Rehab  cognitive status;previous functional deficit  -CP        Patient's Goals for Discharge  patient  could not state  -CP           Pain    Additional Documentation  Pain Scale: Numbers Pre/Post-Treatment (Group)  -CP           Pain Scale: Numbers Pre/Post-Treatment    Pretreatment Pain Rating  0/10 - no pain  -CP        Posttreatment Pain Rating  0/10 - no pain  -CP           Oral Motor Structure and Function    Dentition Assessment  missing teeth  -CP        Secretion Management  WNL/WFL  -CP        Mucosal Quality  moist, healthy  -CP           Oral Musculature and Cranial Nerve Assessment    Oral Motor General Assessment  oral labial or buccal impairment;lingual impairment  -CP        Oral Labial or Buccal Impairment, Detail, Cranial Nerve VII (Facial):  left labial droop  -CP        Lingual Impairment, Detail. Cranial Nerves IX, XII (Glossopharyngeal and Hypoglossal)  reduced lingual ROM;reduced strength  -CP           General Eating/Swallowing Observations    Respiratory Support Currently in Use  room air  -CP        Eating/Swallowing Skills  fed by SLP  -CP        Positioning During Eating  upright in bed  -CP        Utensils Used  spoon;cup;straw  -CP        Consistencies Trialed  thin liquids;nectar/syrup-thick liquids;pureed;regular textures  -CP        Pre SpO2 (%)  98  -CP        Post SpO2 (%)  98  -CP           Clinical Swallow Eval    Clinical Swallow Evaluation Summary  Clinical swallow eval completed. Overt cough noted with thin liquids via straw x2, appearing d/t reduced coordination/mistiming. Unable to drink from cup without gross anterior loss (may be impacted by fresh sores and swelling on lips). No s/s with NTL, puree, or dry solid. Mastication appeared WFL without oral residue. Pt indicated that he was on a pureed diet at his facility, and that he did not know why. Spoke with pt's brother via phone who confirmed this recent change, and reported it was due to poor intake only. When asked whether this appeared to improve intake, he stated he hadn't heard. Suggest diet advancement to [facility] soft  (to be least restrictive as well as safe, and for QOL), with downgrade to NTL at this time. Meds crushed with puree. Pt will need 1:1 assist with PO. Ensure pt take only small bites and sips, and use slow rate, monitoring for pocketing. Will re-eval in 1-2 days. With any concerns re: tolerance of mech soft, downgrade to puree.  -CP           Clinical Impression    SLP Swallowing Diagnosis  oral dysphagia;suspected pharyngeal dysphagia  -CP        Functional Impact  risk of aspiration/pneumonia  -CP        Rehab Potential/Prognosis, Swallowing  good, to achieve stated therapy goals  -CP        Swallow Criteria for Skilled Therapeutic Interventions Met  demonstrates skilled criteria  -CP           Recommendations    Therapy Frequency (Swallow)  PRN  -CP        Predicted Duration Therapy Intervention (Days)  until discharge  -CP        SLP Diet Recommendation  mechanical soft with no mixed consistencies;nectar thick liquids  -CP        Recommended Diagnostics  reassess via clinical swallow evaluation  -CP        Recommended Precautions and Strategies  upright posture during/after eating;small bites of food and sips of liquid;check mouth frequently for oral residue/pocketing;1:1 supervision;assist with feeding  -CP        Oral Care Recommendations  Oral Care before breakfast, after meals and PRN  -CP        SLP Rec. for Method of Medication Administration  meds crushed;with pudding or applesauce  -CP        Monitor for Signs of Aspiration  yes;notify SLP if any concerns  -CP        Anticipated Discharge Disposition (SLP)  extended care facility  -CP           Swallow Goals (SLP)    Oral Nutrition/Hydration Goal Selection (SLP)  oral nutrition/hydration, SLP goal 1  -CP           Oral Nutrition/Hydration Goal 1 (SLP)    Oral Nutrition/Hydration Goal 1, SLP  Tolerate least restrictive diet  -CP        Time Frame (Oral Nutrition/Hydration Goal 1, SLP)  by discharge  -CP          User Key  (r) = Recorded By, (t) = Taken By,  (c) = Cosigned By    Initials Name Effective Dates    Dalila Vasquez, MS CCC-SLP 06/08/18 -           EDUCATION  The patient has been educated in the following areas:   Dysphagia (Swallowing Impairment) Oral Care/Hydration Modified Diet Instruction.    SLP Recommendation and Plan  SLP Swallowing Diagnosis: oral dysphagia, suspected pharyngeal dysphagia  SLP Diet Recommendation: mechanical soft with no mixed consistencies, nectar thick liquids  Recommended Precautions and Strategies: upright posture during/after eating, small bites of food and sips of liquid, check mouth frequently for oral residue/pocketing, 1:1 supervision, assist with feeding  SLP Rec. for Method of Medication Administration: meds crushed, with pudding or applesauce     Monitor for Signs of Aspiration: yes, notify SLP if any concerns  Recommended Diagnostics: reassess via clinical swallow evaluation  Swallow Criteria for Skilled Therapeutic Interventions Met: demonstrates skilled criteria  Anticipated Discharge Disposition (SLP): extended care facility  Rehab Potential/Prognosis, Swallowing: good, to achieve stated therapy goals  Therapy Frequency (Swallow): PRN  Predicted Duration Therapy Intervention (Days): until discharge          Patient was not wearing a face mask during this therapy encounter. Therapist used appropriate personal protective equipment including mask, eye protection and gloves.  Mask used was standard procedure mask. Appropriate PPE was worn during the entire therapy session. Hand hygiene was completed before and after therapy session. Patient is not in enhanced droplet precautions.           Plan of Care Reviewed With: patient  Outcome Summary: Clinical swallow eval completed. Overt cough noted with thin liquids via straw x2, appearing d/t reduced coordination/mistiming. Unable to drink from cup without gross anterior loss (may be impacted by fresh sores and swelling on lips). No s/s with NTL, puree, or dry solid.  Mastication appeared WFL without oral residue. Pt indicated that he was on a pureed diet at his facility, and that he did not know why. Spoke with pt's brother via phone who confirmed this recent change, and reported it was due to poor intake only. When asked whether this appeared to improve intake, he stated he hadn't heard. Suggest diet advancement to mech soft (to be least restrictive as well as safe, and for QOL), with downgrade to NTL at this time. Meds crushed with puree. Pt will need 1:1 assist with PO. Ensure pt take only small bites and sips, and use slow rate, monitoring for pocketing. Will re-eval in 1-2 days. With any concerns re: tolerance of mech soft, downgrade to puree.    SLP GOALS     Row Name 11/09/20 0900             Oral Nutrition/Hydration Goal 1 (SLP)    Oral Nutrition/Hydration Goal 1, SLP  Tolerate least restrictive diet  -CP      Time Frame (Oral Nutrition/Hydration Goal 1, SLP)  by discharge  -CP        User Key  (r) = Recorded By, (t) = Taken By, (c) = Cosigned By    Initials Name Provider Type    Dalila Vasquez MS CCC-SLP Speech and Language Pathologist           SLP Outcome Measures (last 72 hours)      SLP Outcome Measures     Row Name 11/09/20 1000             SLP Outcome Measures    Outcome Measure Used?  Adult NOMS  -CP         Adult FCM Scores    FCM Chosen  Swallowing  -CP      Swallowing FCM Score  4  -CP        User Key  (r) = Recorded By, (t) = Taken By, (c) = Cosigned By    Initials Name Effective Dates    Dalila Vasquez MS CCC-SLP 06/08/18 -            Time Calculation:   Time Calculation- SLP     Row Name 11/09/20 1028             Time Calculation- SLP    SLP Start Time  0800  -CP      SLP Stop Time  0900  -CP      SLP Time Calculation (min)  60 min  -CP      SLP Received On  11/09/20  -CP        User Key  (r) = Recorded By, (t) = Taken By, (c) = Cosigned By    Initials Name Provider Type    Dalila Vasquez MS CCC-SLP Speech and Language Pathologist           Therapy Charges for Today     Code Description Service Date Service Provider Modifiers Qty    [id number]  ST EVAL ORAL PHARYNG SWALLOW 4 11/9/2020 Dalila Calero, MS CCC-SLP GN 1               Dalila Calero MS CCC-SLP  11/9/2020

## 2020-11-09 NOTE — PROGRESS NOTES
Discharge Planning Assessment  [facility]     Patient Name: Trever Petit  MRN: [id number]  Today's Date: 11/9/2020    Admit Date: 11/8/2020    Discharge Needs Assessment     Row Name 11/09/20 1456       Living Environment    Lives With  facility resident    Name(s) of Who Lives With Patient  [facility]    Current Living Arrangements  residential facility    Primary Care Provided by  other (see comments)    Provides Primary Care For  no one, unable/limited ability to care for self    Family Caregiver if Needed  sibling(s)    Family Caregiver Names  Les Petit, Brother [phone]    Quality of Family Relationships  helpful;involved;supportive    Able to Return to Prior Arrangements  yes       Resource/Environmental Concerns    Transportation Concerns  other (see comments)       Transition Planning    Patient/Family Anticipates Transition to  long-term care facility    Transportation Anticipated  health plan transportation       Discharge Needs Assessment    Readmission Within the Last 30 Days  previous discharge plan unsuccessful    Current Outpatient/Agency/Support Group  long-term acute care facility    Discharge Facility/Level of Care Needs  nursing facility, intermediate    Provided Post Acute Provider List?  Refused    Provided Post Acute Provider Quality & Resource List?  Refused    Current Discharge Risk  chronically ill;cognitively impaired        Discharge Plan     Row Name 11/09/20 1458       Plan    Plan  Return to [facility]    Patient/Family in Agreement with Plan  yes    Plan Comments  Facesheet verified, CCP role explained.  Patient is from [facility] bed and plans to return.  He has a brother, Les Petit that helps with all of his care.  Patient will need [facility] for transport at discharge.  CCP will follow for any needs.  Call out to [facility]/ [facility] to verify bed status.        Continued Care and Services - Admitted Since 11/8/2020     Destination      Service Provider Request Status Selected Services Address Phone Fax    [facility]  Pending - Request Sent N/A [address] [facility] [id number] [phone]                Demographic Summary     Row Name 11/09/20 1454       General Information    Admission Type  inpatient    Arrived From  subacute/long-term acute care    Reason for Consult  discharge planning    Preferred Language  English       Contact Information    Permission Granted to Share Info With  family/designee    Contact Information Comments  brother  Les Petit [phone]        Functional Status     Row Name 11/09/20 1455       Functional Status    Usual Activity Tolerance  poor    Current Activity Tolerance  poor       Functional Status, IADL    Medications  completely dependent    Meal Preparation  completely dependent    Housekeeping  completely dependent    Laundry  completely dependent    Shopping  completely dependent       Mental Status Summary    Recent Changes in Mental Status/Cognitive Functioning  unable to assess        Psychosocial     Row Name 11/09/20 1459       Values/Beliefs    Spiritual, Cultural Beliefs, Confucianist Practices, Values that Affect Care  no       Speech WDL    Speech WDL  WDL       Perceptual State WDL    Perceptual State WDL  ex       Thought Process WDL    Thought Process WDL  ex    Judgment and Insight  judgment not appropriate to situation;insight not appropriate to situation       Intellectual Performance WDL    Level of Consciousness  Alert       Coping/Stress    Major Change/Loss/Stressor  unable to assess    Patient Personal Strengths  strong support system    Sources of Support  sibling(s)    Understanding of Condition and Treatment  unable to comprehend        Abuse/Neglect     Row Name 11/09/20 1456       Personal Safety    Feels Unsafe at Home or Work/School  no    Feels Threatened by Someone  no    Does Anyone Try to Keep You From Having Contact with Others or  Doing Things Outside Your Home?  no    Physical Signs of Abuse Present  no        Legal    No documentation.       Substance Abuse    No documentation.       Patient Forms    No documentation.           Shannon Epley, RN

## 2020-11-09 NOTE — H&P
HISTORY AND PHYSICAL   [facility], [facility]      2020    Patient Identification:    Name: Trever Petit  Age: 64 y.o.  Sex: male  :  1955  MRN: [id number]                       Primary Care Physician: George Ware MD    Chief Complaint:      Chief Complaint   Patient presents with   • Lip Laceration   • Fall         History of Present Illness:     Patient is a 64-year-old gentleman, resident of nursing home.  Patient has history of COPD, coronary artery disease, diabetes mellitus, vascular dementia, stroke.  Patient fell right prior to admission, apparently he fell out of bed.  Patient was brought to the ER, in the ER, patient was found to have: Bradycardia, blood pressure of 199//99, sodium 131, potassium 4.1, WBC 7.5, hemoglobin 12.0, CT facial no evidence of acute intracranial abnormality.  CT of the neck shows chronic fracture of the left C1 ring with healing.  In the ER patient was given Cardene to treat elevated blood pressure, then patient was admitted for further management.  No urinalysis available.        Past Medical History:  Past Medical History:   Diagnosis Date   • JENI (acute kidney injury) (CMS/[facility]) 2019   • Anxiety    • Arthritis    • Bradycardia    • Carpal bone fracture    • Carpal tunnel syndrome    • Cerebrovascular accident (CMS/[facility])    • Constipation    • COPD (chronic obstructive pulmonary disease) (CMS/[facility])    • Coronary artery disease    • Diabetes mellitus (CMS/[facility])    • E. coli UTI (urinary tract infection) 2019   • Elevated cholesterol    • Hepatitis B    • Hepatitis-C    • Hypertension    • Myocardial infarction (CMS/[facility])    • Neuropathy    • Seizure-like activity (CMS/HCC) 2019   • Spinal stenosis    • Stroke (CMS/[facility])    • Type 2 diabetes mellitus, without long-term current use of insulin (CMS/HCC) 2019   • Vascular dementia (CMS/[facility]) 2019     Past Surgical History:  Past Surgical History:   Procedure Laterality Date    • CARDIAC CATHETERIZATION N/A 6/25/2019    Procedure: Left Heart Cath;  Surgeon: Chen Aguilar MD;  Location:  [facility];  Service: Cardiovascular   • CARDIAC CATHETERIZATION N/A 6/25/2019    Procedure: Left ventriculography;  Surgeon: Chen Aguilar MD;  Location:  [facility];  Service: Cardiovascular   • CARDIAC CATHETERIZATION N/A 6/25/2019    Procedure: Coronary angiography;  Surgeon: Chen Aguilar MD;  Location:  [facility];  Service: Cardiovascular   • CARPAL TUNNEL RELEASE     • CERVICAL FUSION     • NECK SURGERY        Home Meds:  Medications Prior to Admission   Medication Sig Dispense Refill Last Dose   • acetaminophen (TYLENOL) 325 MG tablet Take 650 mg by mouth Every 6 (Six) Hours As Needed for Mild Pain .      • aspirin 81 MG EC tablet Take 81 mg by mouth Daily.      • atorvastatin (LIPITOR) 20 MG tablet Take 1 tablet by mouth Daily. 30 tablet 5    • calcium carbonate-cholecalciferol 500-400 MG-UNIT tablet tablet Take  by mouth Daily.      • cefTRIAXone Sodium (ROCEPHIN IJ) Inject 1 g as directed Daily. Last dose on the 10th of November      • divalproex (DEPAKOTE) 250 MG DR tablet Take 250 mg by mouth 3 (Three) Times a Day.      • docusate sodium 100 MG capsule Take 100 mg by mouth 2 (Two) Times a Day.      • lisinopril (PRINIVIL,ZESTRIL) 40 MG tablet Take 1 tablet by mouth Daily. (Patient taking differently: Take 5 mg by mouth Every Night.) 30 tablet 5    • melatonin 5 MG tablet tablet Take 5 mg by mouth.      • OLANZapine (zyPREXA) 2.5 MG tablet Take 2.5 mg by mouth Every Night.      • OXcarbazepine (TRILEPTAL) 600 MG tablet Take 600 mg by mouth 2 (Two) Times a Day.      • PARoxetine (PAXIL) 10 MG tablet Take 1 tablet by mouth Every Morning.      • polyethylene glycol (MIRALAX) 17 g packet Take 17 g by mouth Daily.      • tamsulosin (FLOMAX) 0.4 MG capsule 24 hr capsule Take 1 capsule by mouth Every Night.      • trihexyphenidyl  "(ARTANE) 2 MG tablet Take 2 mg by mouth 2 (Two) Times a Day With Meals.      • amLODIPine (NORVASC) 5 MG tablet Take 1 tablet by mouth Daily.      • escitalopram (LEXAPRO) 10 MG tablet Take 10 mg by mouth Daily.          Allergies:  Allergies   Allergen Reactions   • Clonidine Derivatives Unknown (See Comments)     .   • Hydrochlorothiazide Other (See Comments)     PT STATES CANT URINATE   • Metformin Unknown (See Comments)   • Sitagliptin Nausea And Vomiting     Immunizations:  Immunization History   Administered Date(s) Administered   • Tdap 2020, 2020     Social History:   Social History     Social History Narrative   • Not on file     Social History     Tobacco Use   • Smoking status: Never Smoker   Substance Use Topics   • Alcohol use: No     Frequency: Never     Family History:  History reviewed. No pertinent family history.     Review of Systems  See history of present illness and past medical history.    All systems reviewed and negative except for those discussed in HPI.         Objective:    Exam:    T MAX 24 hrs: Temp (24hrs), Av °F (36.7 °C), Min:98 °F (36.7 °C), Max:98.1 °F (36.7 °C)    Vitals Ranges:   Temp:  [98 °F (36.7 °C)-98.1 °F (36.7 °C)] 98 °F (36.7 °C)  Heart Rate:  [43-70] 56  Resp:  [14-16] 16  BP: (120-211)/() 120/73    /73 (BP Location: Left arm, Patient Position: Sitting)   Pulse 56   Temp 98 °F (36.7 °C) (Oral)   Resp 16   Ht 172.7 cm (68\")   Wt 55.8 kg (123 lb)   SpO2 96%   BMI 18.70 kg/m²     General: Patient is sleepy,  Oriented x 1. No respiratory distress  HEENT:    Head: Normocephalic, without obvious abnormality, atraumatic  Eyes: EOM are normal. Pupils are equal  Oropharynx: Contusion and abrasion of the left aspect of patient's chin.  Poor dentition.  Laceration intraorally at the frenulum.  Contusion of left sided lip.  Neck: Supple, symmetrical, trachea midline, no adenopathy;              thyroid:  no enlargement/tenderness/nodules;           "    no carotid bruit or JVD  Cardiovascular: Normal rate, regular rhythm and intact distal pulses.              Exam reveals no gallop and no friction rub. No murmur heard  Chest wall: No tenderness or deformity  Pulmonary: Clear to auscultation bilaterally, respirations unlabored.               No rhonchi, wheezing or rales.   Abdominal: Soft, nontender, bowel sounds active all four quadrants,     no masses, no hepatomegaly, no splenomegaly.  Cuellar in place  Extremities: Normal, atraumatic, able to move upper extremity, no much movement of the lower extremity.  Pulses: 2 + symmetric all extremities  Neurological: Patient is awake, responsive to questioning, oriented x1.  No new focal sensorimotor deficit.    Skin: Skin color, texture, normal. Turgor is decreased. No rashes or lesions      Data Review:    Results from last 7 days   Lab Units 11/09/20  0520 11/08/20  1849   WBC 10*3/mm3 10.22 7.85   HEMOGLOBIN g/dL 12.0* 12.0*   HEMATOCRIT % 34.8* 35.0*   PLATELETS 10*3/mm3 217 207       Results from last 7 days   Lab Units 11/09/20  0520 11/08/20  1849   SODIUM mmol/L 130* 131*   POTASSIUM mmol/L 4.4 5.1   CHLORIDE mmol/L 94* 93*   CO2 mmol/L 28.4 32.1*   BUN mg/dL 16 20   CREATININE mg/dL 0.60* 0.82   CALCIUM mg/dL 8.6 8.6   BILIRUBIN mg/dL  --  <0.2   ALK PHOS U/L  --  49   ALT (SGPT) U/L  --  15   AST (SGOT) U/L  --  18   GLUCOSE mg/dL 88 95                 Lab Results   Lab Value Date/Time    TROPONINT 0.019 05/23/2020 0921    TROPONINT <0.010 08/30/2019 0443    TROPONINT <0.010 08/28/2019 1308    TROPONINT <0.010 08/19/2019 1727    TROPONINT <0.010 08/19/2019 1538    TROPONINT <0.010 06/25/2019 1703    TROPONINT <0.010 06/25/2019 1502    TROPONINT <0.010 06/22/2019 0028    TROPONINT <0.010 06/21/2019 1846       Brief Urine Lab Results  (Last result in the past 365 days)      Color   Clarity   Blood   Leuk Est   Nitrite   Protein   CREAT   Urine HCG        05/23/20 1041 Yellow Cloudy Small (1+) Large (3+)  Negative 30 mg/dL (1+)                Imaging Results (All)     Procedure Component Value Units Date/Time    CT Head Without Contrast [[id number]] Collected: 11/08/20 2217     Updated: 11/08/20 2231    Narrative:      CT HEAD WITHOUT CONTRAST:      HISTORY:  Fall out of bed from nursing home.     TECHNIQUE:  Axial images were obtained through the brain without  contrast administration. Multiplanar reformatted images were  reconstructed from the helical source data. Radiation dose reduction  techniques were utilized, including automated exposure control and  exposure modulation based on body size.        COMPARISON: 05/23/2020.     FINDINGS:   Extensive encephalomalacia throughout the right hemisphere from prior  right MCA infarction.     Stable ex vacuo dilatation of the right lateral ventricle.     Stable moderate cerebral volume loss with proportionate prominence of  the remaining ventricular system and sulci. No hydrocephalus or midline  shift.      Stable moderate scattered hypodensity throughout the periventricular and  subcortical white matter that is most likely secondary to chronic  microvascular ischemia. There is no evidence of a large territorial  infarction. No hemorrhage or extra-axial fluid collection. Intracranial  atherosclerotic arterial calcifications.      The orbits are unremarkable. The visualized paranasal sinuses and left  mastoid air cells are clear. Moderate opacification of the right mastoid  air cells.       The calvarium is intact. The scalp soft tissues are unremarkable.       Impression:      1.   No acute intracranial abnormality.   2.  Sequela of prior right MCA infarction.  3.  Moderate right mastoid effusion.  4.  Senescent changes.           CERVICAL SPINE CT:     HISTORY:  Follow-up from nursing home.     TECHNIQUE:  Helical axial images were performed from the skull base  through the upper thoracic spine without contrast. Sagittal and coronal  reformatted images were performed.  Radiation dose reduction techniques  were utilized, including automated exposure control and exposure  modulation based on body size.        COMPARISON:  05/23/2020.     FINDINGS:    Chronic fracture of the left C1 ring with healing along the anterior  aspect and nonunion at the posterior aspect. No evidence of acute  fracture. Stable fusion hardware at C4-C7 without hardware complication.  Straightening of the normal cervical lordosis. Stable multilevel  spondylosis. No high-grade spinal canal narrowing. The paravertebral  soft tissues are normal. The lung apices are clear.     IMPRESSION:   1.  No evidence of acute fracture.  2.  Chronic left C1 fracture with partial healing and evidence of  nonunion.  3.  Stable fusion hardware without competition.     This report was finalized on 11/8/2020 10:28 PM by Dr. Shaheen Olea M.D.       CT Cervical Spine Without Contrast [[id number]] Collected: 11/08/20 2217     Updated: 11/08/20 2231    Narrative:      CT HEAD WITHOUT CONTRAST:      HISTORY:  Fall out of bed from nursing home.     TECHNIQUE:  Axial images were obtained through the brain without  contrast administration. Multiplanar reformatted images were  reconstructed from the helical source data. Radiation dose reduction  techniques were utilized, including automated exposure control and  exposure modulation based on body size.        COMPARISON: 05/23/2020.     FINDINGS:   Extensive encephalomalacia throughout the right hemisphere from prior  right MCA infarction.     Stable ex vacuo dilatation of the right lateral ventricle.     Stable moderate cerebral volume loss with proportionate prominence of  the remaining ventricular system and sulci. No hydrocephalus or midline  shift.      Stable moderate scattered hypodensity throughout the periventricular and  subcortical white matter that is most likely secondary to chronic  microvascular ischemia. There is no evidence of a large territorial  infarction. No hemorrhage  or extra-axial fluid collection. Intracranial  atherosclerotic arterial calcifications.      The orbits are unremarkable. The visualized paranasal sinuses and left  mastoid air cells are clear. Moderate opacification of the right mastoid  air cells.       The calvarium is intact. The scalp soft tissues are unremarkable.       Impression:      1.   No acute intracranial abnormality.   2.  Sequela of prior right MCA infarction.  3.  Moderate right mastoid effusion.  4.  Senescent changes.           CERVICAL SPINE CT:     HISTORY:  Follow-up from nursing home.     TECHNIQUE:  Helical axial images were performed from the skull base  through the upper thoracic spine without contrast. Sagittal and coronal  reformatted images were performed. Radiation dose reduction techniques  were utilized, including automated exposure control and exposure  modulation based on body size.        COMPARISON:  05/23/2020.     FINDINGS:    Chronic fracture of the left C1 ring with healing along the anterior  aspect and nonunion at the posterior aspect. No evidence of acute  fracture. Stable fusion hardware at C4-C7 without hardware complication.  Straightening of the normal cervical lordosis. Stable multilevel  spondylosis. No high-grade spinal canal narrowing. The paravertebral  soft tissues are normal. The lung apices are clear.     IMPRESSION:   1.  No evidence of acute fracture.  2.  Chronic left C1 fracture with partial healing and evidence of  nonunion.  3.  Stable fusion hardware without competition.     This report was finalized on 11/8/2020 10:28 PM by Dr. Shaheen Olea M.D.             Assessment:      Malignant hypertensive urgency  Hyponatremia  Dehydration  Chronic bradycardia  Face contusion  Probably concussion  Vascular dementia with visual disturbance  COPD  Diabetes mellitus  Coagulation  Hypertension  Late effects of CVA          Plan:    Inpatient admission  IV fluids, patient has hyponatremia, dehydrated.  Will check  urine for urinalysis with urine culture, patient's fall may be due to a confusion state, r/o occult Infection.  Mental status not at baseline, will monitor closely  Monitor and correct electrolytes, correct hyponatremia  Accu-Chek and SSI  Home medications  DVT/stress ulcer prophylaxis  PT/OT/ST consult  Labs in         George Ware MD  11/9/2020  08:09 EST

## 2020-11-09 NOTE — PLAN OF CARE
Problem: Adult Inpatient Plan of Care  Goal: Plan of Care Review  Flowsheets (Taken 11/9/2020 1026)  Plan of Care Reviewed With: patient  Outcome Summary: Clinical swallow eval completed. Overt cough noted with thin liquids via straw x2, appearing d/t reduced coordination/mistiming. Unable to drink from cup without gross anterior loss (may be impacted by fresh sores and swelling on lips). No s/s with NTL, puree, or dry solid. Mastication appeared WFL without oral residue. Pt indicated that he was on a pureed diet at his facility, and that he did not know why. Spoke with pt's brother via phone who confirmed this recent change, and reported it was due to poor intake only. When asked whether this appeared to improve intake, he stated he hadn't heard. Suggest:   Diet advancement to mech soft (to be least restrictive as well as safe, and for QOL), with downgrade to NTL.   Meds crushed with puree.   Pt will need 1:1 assist with PO. Ensure pt take only small bites and sips, and use slow rate, monitoring for pocketing.   Will re-eval in 1-2 days.   With any concerns re: tolerance of mech soft, downgrade to puree.

## 2020-11-09 NOTE — PROGRESS NOTES
"Adult Nutrition  Assessment/PES    Patient Name:  Trever Petit  YOB: 1955  MRN: [id number]  Admit Date:  11/8/2020    Assessment Date:  11/9/2020  Nutrition assessment triggered by low BMI-18.7.  EMR reviewed. Patient admitted with Hypertensive emergency, advanced dementia.  Speech therapy evaluated-[facility] soft+NTL.  Per chart, patient has lost around 20# in the past 6 months. MD noted muscle wasting present. Malnourished.  Ordered nutritional supplement-mighty shakes TID. Needs encouragement with meals.  Will continue to follow/monitor.   Reason for Assessment     Row Name 11/09/20 1051          Reason for Assessment    Reason For Assessment  identified at risk by screening criteria     Diagnosis  Hypertensive emergency, advanced dementia, DM, seizures, HTN     Identified At Risk by Screening Criteria  low BMI-18.7         Nutrition/Diet History     Row Name 11/09/20 1051          Nutrition/Diet History    Typical Food/Fluid Intake  Speech therapy evaluated-[facility] soft+NTL         Anthropometrics     Row Name 11/09/20 1051          Anthropometrics    Height  172.7 cm (67.99\")        Admit Weight    Admit Weight  55.8 kg (123 lb 0.3 oz)        Ideal Body Weight (IBW)    Ideal Body Weight (IBW) (kg)  70.87     % of Ideal Body Weight Assessment  70-79%: moderate deficit 78%        Usual Body Weight (UBW)    Weight Loss  unintentional     Weight Loss Time Frame  20# in the past 6 months        Body Mass Index (BMI)    BMI Assessment  BMI 18.5-24.9: normal BMI-18.7         Labs/Tests/Procedures/Meds     Row Name 11/09/20 1052          Labs/Procedures/Meds    Lab Results Reviewed  reviewed, pertinent     Lab Results Comments  Na        Diagnostic Tests/Procedures    Diagnostic Test/Procedure Reviewed  reviewed, pertinent        Medications    Pertinent Medications Reviewed  reviewed, pertinent     Pertinent Medications Comments  NaCl         Physical Findings     Row Name 11/09/20 1052          Physical " "Findings    Overall Physical Appearance  underweight;loss of muscle mass B=14         Estimated/Assessed Needs     Row Name 11/09/20 1052 11/09/20 1051       Calculation Measurements    Weight Used For Calculations  55.8 kg (123 lb 0.3 oz)      Height    172.7 cm (67.99\")       Estimated/Assessed Needs    Additional Documentation  KCAL/KG (Group);Protein Requirements (Group);Fluid Requirements (Group)         KCAL/KG    KCAL/KG  30 Kcal/Kg (kcal);35 Kcal/Kg (kcal)      30 Kcal/Kg (kcal)  1674      35 Kcal/Kg (kcal)  1953         Protein Requirements    Weight Used For Protein Calculations  55.8 kg (123 lb 0.3 oz)      Est Protein Requirement Amount (gms/kg)  1.2 gm protein      Estimated Protein Requirements (gms/day)  66.96         Fluid Requirements    Fluid Requirements (mL/day)  1650      RDA Method (mL)  1650          Nutrition Prescription Ordered     Row Name 11/09/20 1053          Nutrition Prescription PO    Current PO Diet  Dysphagia     Dysphagia Level  4  Mechanical soft no mixed consistencies     Fluid Consistency  Nectar/syrup thick             Malnutrition Severity Assessment     Row Name 11/09/20 1053          Malnutrition Severity Assessment    Malnutrition Type  Starvation - Related Malnutrition        Insufficient Energy Intake     Insufficient Energy Intake   <75% of est. energy requirement for > or equal to 3 months        Unintentional Weight Loss     Unintentional Weight Loss   Weight loss greater than 10% in six months 16% weight loss in 6 months        Muscle Loss    Loss of Muscle Mass Findings  Severe per MD note, Patient has muscle wasting and contractures to extremities most prominent to his lower extremities        Criteria Met (Must meet criteria for severity in at least 2 of these categories: M Wasting, Fat Loss, Fluid, Secondary Signs, Wt. Status, Intake)    Patient meets criteria for   Severe Malnutrition           Problem/Interventions:  Problem 1     Row Name 11/09/20 1054       "    Nutrition Diagnoses Problem 1    Problem 1  Malnutrition     Etiology (related to)  Functional Diagnosis     Functional Diagnosis  Cognitive deficit     Signs/Symptoms (evidenced by)  Unintended Weight Change     Unintended Weight Change  Loss     Number of Pounds Lost  20#     Weight loss time period  6 months               Intervention Goal     Row Name 11/09/20 1054          Intervention Goal    General  Maintain nutrition;Reduce/improve symptoms;Meet nutritional needs for age/condition     PO  Tolerate PO;Increase intake     Weight  Appropriate weight gain         Nutrition Intervention     Row Name 11/09/20 1054          Nutrition Intervention    RD/Tech Action  Follow Tx progress;Care plan reviewd;Recommend/ordered;Supplement provided     Recommended/Ordered  Supplement         Nutrition Prescription     Row Name 11/09/20 1054          Nutrition Prescription PO    PO Prescription  Begin/change supplement     Supplement  Mighty Shake     Supplement Frequency  3 times a day     New PO Prescription Ordered?  Yes         Education/Evaluation     Row Name 11/09/20 1054          Education    Education  Will Instruct as appropriate        Monitor/Evaluation    Monitor  Per protocol           Electronically signed by:  Shefali Barcenas RD  11/09/20 10:54 EST

## 2020-11-10 LAB
ANION GAP SERPL CALCULATED.3IONS-SCNC: 6.8 MMOL/L (ref 5–15)
BUN SERPL-MCNC: 19 MG/DL (ref 8–23)
BUN/CREAT SERPL: 29.2 (ref 7–25)
CALCIUM SPEC-SCNC: 8.2 MG/DL (ref 8.6–10.5)
CHLORIDE SERPL-SCNC: 97 MMOL/L (ref 98–107)
CO2 SERPL-SCNC: 25.2 MMOL/L (ref 22–29)
CREAT SERPL-MCNC: 0.65 MG/DL (ref 0.76–1.27)
DEPRECATED RDW RBC AUTO: 39.6 FL (ref 37–54)
ERYTHROCYTE [DISTWIDTH] IN BLOOD BY AUTOMATED COUNT: 11.2 % (ref 12.3–15.4)
GFR SERPL CREATININE-BSD FRML MDRD: 124 ML/MIN/1.73
GLUCOSE BLDC GLUCOMTR-MCNC: 107 MG/DL (ref 70–130)
GLUCOSE BLDC GLUCOMTR-MCNC: 108 MG/DL (ref 70–130)
GLUCOSE BLDC GLUCOMTR-MCNC: 125 MG/DL (ref 70–130)
GLUCOSE BLDC GLUCOMTR-MCNC: 73 MG/DL (ref 70–130)
GLUCOSE SERPL-MCNC: 77 MG/DL (ref 65–99)
HCT VFR BLD AUTO: 30.4 % (ref 37.5–51)
HGB BLD-MCNC: 10.5 G/DL (ref 13–17.7)
MCH RBC QN AUTO: 33.9 PG (ref 26.6–33)
MCHC RBC AUTO-ENTMCNC: 34.5 G/DL (ref 31.5–35.7)
MCV RBC AUTO: 98.1 FL (ref 79–97)
PLATELET # BLD AUTO: 201 10*3/MM3 (ref 140–450)
PMV BLD AUTO: 9.3 FL (ref 6–12)
POTASSIUM SERPL-SCNC: 4.3 MMOL/L (ref 3.5–5.2)
RBC # BLD AUTO: 3.1 10*6/MM3 (ref 4.14–5.8)
SODIUM SERPL-SCNC: 129 MMOL/L (ref 136–145)
WBC # BLD AUTO: 6.85 10*3/MM3 (ref 3.4–10.8)

## 2020-11-10 PROCEDURE — 25010000002 CEFTRIAXONE PER 250 MG: Performed by: INTERNAL MEDICINE

## 2020-11-10 PROCEDURE — 80048 BASIC METABOLIC PNL TOTAL CA: CPT | Performed by: INTERNAL MEDICINE

## 2020-11-10 PROCEDURE — 82962 GLUCOSE BLOOD TEST: CPT

## 2020-11-10 PROCEDURE — 85027 COMPLETE CBC AUTOMATED: CPT | Performed by: INTERNAL MEDICINE

## 2020-11-10 PROCEDURE — 92526 ORAL FUNCTION THERAPY: CPT

## 2020-11-10 RX ORDER — CEFTRIAXONE SODIUM 1 G/50ML
1 INJECTION, SOLUTION INTRAVENOUS EVERY 24 HOURS
Status: DISCONTINUED | OUTPATIENT
Start: 2020-11-10 | End: 2020-11-11

## 2020-11-10 RX ADMIN — SODIUM CHLORIDE, PRESERVATIVE FREE 10 ML: 5 INJECTION INTRAVENOUS at 20:18

## 2020-11-10 RX ADMIN — TAMSULOSIN HYDROCHLORIDE 0.4 MG: 0.4 CAPSULE ORAL at 20:18

## 2020-11-10 RX ADMIN — DIVALPROEX SODIUM 250 MG: 250 TABLET, DELAYED RELEASE ORAL at 09:42

## 2020-11-10 RX ADMIN — ATORVASTATIN CALCIUM 20 MG: 20 TABLET, FILM COATED ORAL at 09:42

## 2020-11-10 RX ADMIN — SODIUM CHLORIDE 75 ML/HR: 9 INJECTION, SOLUTION INTRAVENOUS at 14:13

## 2020-11-10 RX ADMIN — AMLODIPINE BESYLATE 5 MG: 5 TABLET ORAL at 09:41

## 2020-11-10 RX ADMIN — LISINOPRIL 5 MG: 5 TABLET ORAL at 20:18

## 2020-11-10 RX ADMIN — OXCARBAZEPINE 600 MG: 300 TABLET, FILM COATED ORAL at 09:41

## 2020-11-10 RX ADMIN — PAROXETINE HYDROCHLORIDE HEMIHYDRATE 10 MG: 10 TABLET, FILM COATED ORAL at 09:42

## 2020-11-10 RX ADMIN — CEFTRIAXONE SODIUM 1 G: 1 INJECTION, SOLUTION INTRAVENOUS at 12:00

## 2020-11-10 RX ADMIN — OXCARBAZEPINE 600 MG: 300 TABLET, FILM COATED ORAL at 20:18

## 2020-11-10 RX ADMIN — ASPIRIN 81 MG: 81 TABLET, FILM COATED ORAL at 09:42

## 2020-11-10 RX ADMIN — DIVALPROEX SODIUM 250 MG: 250 TABLET, DELAYED RELEASE ORAL at 20:18

## 2020-11-10 NOTE — PLAN OF CARE
Goal Outcome Evaluation:  Plan of Care Reviewed With: patient  Progress: no change  Outcome Summary: Alert, speech slow, mostly understandable. Tolerating meds in applesauce well. SB BBB in 40's. B/P improved after HS Lisinopril. Incontinent of urine. Safety maintained.

## 2020-11-10 NOTE — PLAN OF CARE
Problem: Adult Inpatient Plan of Care  Goal: Plan of Care Review  Outcome: Ongoing, Progressing  Flowsheets (Taken 11/9/2020 2023)  Progress: no change  Plan of Care Reviewed With: patient  Outcome Summary: Speech slow, but pt is oriented to place and person. Meds given in pudding. Swallow eval completed this am. Diet advanced to Mechanical soft with NTL. HR in the 40s most of the day. Code status clarified this pm. Safety maintained. Will continue to monitor.   Goal Outcome Evaluation:  Plan of Care Reviewed With: patient  Progress: no change  Outcome Summary: Speech slow, but pt is oriented to place and person. Meds given in pudding. Swallow eval completed this am. Diet advanced to Mechanical soft with NTL. HR in the 40s most of the day. Code status clarified this pm. Safety maintained. Will continue to monitor.

## 2020-11-10 NOTE — PLAN OF CARE
Problem: Adult Inpatient Plan of Care  Goal: Plan of Care Review  Outcome: Ongoing, Progressing  Flowsheets (Taken 11/10/2020 1517)  Plan of Care Reviewed With: patient  Outcome Summary: Re-eval of swallow completed. Recommend upgrade to thin liquids with mechanical soft no mixed consistencies. Meds whole or crushed with puree.  Recommend 1:1 assist, liquid via straw, upright, slow rate. SLP to follow for diet tolerance.

## 2020-11-10 NOTE — THERAPY RE-EVALUATION
Acute Care - Speech Language Pathology   Swallow Re-Evaluation [facility]     Patient Name: Trever Petit  : 1955  MRN: [id number]  Today's Date: 11/10/2020               Admit Date: 2020    Visit Dx:     ICD-10-CM ICD-9-CM   1. Malignant hypertensive urgency  I16.0 401.0   2. Bradycardia chronic  R00.1 427.89   3. Contusion of face, initial encounter  S00.83XA 920   4. Dementia with behavioral disturbance, unspecified dementia type (CMS/HCC)  F03.91 294.21     Patient Active Problem List   Diagnosis   • Hypertensive emergency   • Essential hypertension   • Stroke (CMS/Formerly [facility])   • Hypoglycemia   • Vascular dementia (CMS/Formerly [facility])   • Type 2 diabetes mellitus, without long-term current use of insulin (CMS/Formerly [facility])   • Constipation   • Leukocytosis   • Fever   • Closed fracture of multiple ribs of left side   • Pleural effusion   • Left lower lobe pneumonia   • Cystitis   • Seizure-like activity (CMS/[facility]ly [facility])   • E. coli UTI (urinary tract infection)   • Fall   • C1 cervical fracture (CMS/[facility]ly [facility])   • Bradycardia   • Malignant hypertensive urgency   • Severe malnutrition (CMS/HCC)     Past Medical History:   Diagnosis Date   • JENI (acute kidney injury) (CMS/[facility]ly [facility]) 2019   • Anxiety    • Arthritis    • Bradycardia    • Carpal bone fracture    • Carpal tunnel syndrome    • Cerebrovascular accident (CMS/[facility]ly [facility])    • Constipation    • COPD (chronic obstructive pulmonary disease) (CMS/[facility])    • Coronary artery disease    • Diabetes mellitus (CMS/[facility])    • E. coli UTI (urinary tract infection) 2019   • Elevated cholesterol    • Hepatitis B    • Hepatitis-C    • Hypertension    • Myocardial infarction (CMS/[facility])    • Neuropathy    • Seizure-like activity (CMS/HCC) 2019   • Spinal stenosis    • Stroke (CMS/[facility])    • Type 2 diabetes mellitus, without long-term current use of insulin (CMS/HCC) 2019   • Vascular dementia (CMS/[facility]) 2019     Past Surgical History:   Procedure Laterality Date   • CARDIAC  CATHETERIZATION N/A 6/25/2019    Procedure: Left Heart Cath;  Surgeon: Chen Aguilar MD;  Location:  [facility];  Service: Cardiovascular   • CARDIAC CATHETERIZATION N/A 6/25/2019    Procedure: Left ventriculography;  Surgeon: Chen Aguilar MD;  Location:  [facility];  Service: Cardiovascular   • CARDIAC CATHETERIZATION N/A 6/25/2019    Procedure: Coronary angiography;  Surgeon: Chen Aguilar MD;  Location:  [facility];  Service: Cardiovascular   • CARPAL TUNNEL RELEASE     • CERVICAL FUSION     • NECK SURGERY     Patient was not wearing a face mask during this therapy encounter. Therapist used appropriate personal protective equipment including mask, eye protection and gloves.  Mask used was standard procedure mask. Appropriate PPE was worn during the entire therapy session. Hand hygiene was completed before and after therapy session. Patient is not in enhanced droplet precautions.                  SWALLOW EVALUATION (last 72 hours)      SLP Adult Swallow Evaluation     Row Name 11/10/20 1400 11/09/20 0900                Rehab Evaluation    Document Type  re-evaluation  -OC  evaluation  -CP       Subjective Information  no complaints  -OC  no complaints  -CP       Patient Observations  alert;cooperative;agree to therapy  -OC  alert;cooperative  -CP       Care Plan Review  --  evaluation/treatment results reviewed;patient/other agree to care plan  -CP       Care Plan Review, Other Participant(s)  --  sibling  -CP       Patient Effort  good  -OC  good  -CP       Symptoms Noted During/After Treatment  none  -OC  none  -CP          General Information    Patient Profile Reviewed  yes  -OC  yes  -CP       Pertinent History Of Current Problem  --  Admitted s/p fall from NH, HTN urgency. CT of head and neck negative for acute change. Hx of R MCA CVA and advancing dementia, as well as old C1 fx.   -CP       Current Method of Nutrition  soft  textures;ground;nectar/syrup-thick liquids  -OC  NPO  -CP       Precautions/Limitations, Vision  vision impairment, bilaterally  -OC  vision impairment, bilaterally  -CP       Precautions/Limitations, Hearing  WFL  -OC  WFL  -CP       Prior Level of Function-Communication  cognitive-linguistic impairment;motor speech impairment  -OC  cognitive-linguistic impairment;motor speech impairment  -CP       Prior Level of Function-Swallowing  puree;thin liquids;other (see comments)  -OC  puree;thin liquids;other (see comments) recently downgraded to puree d/t poor intake per brother  -CP       Plans/Goals Discussed with  patient;agreed upon  -OC  patient;agreed upon  -CP       Barriers to Rehab  cognitive status;previous functional deficit  -OC  cognitive status;previous functional deficit  -CP       Patient's Goals for Discharge  patient could not state  -OC  patient could not state  -CP          Pain    Additional Documentation  --  Pain Scale: Numbers Pre/Post-Treatment (Group)  -CP          Pain Scale: Numbers Pre/Post-Treatment    Pretreatment Pain Rating  0/10 - no pain  -OC  0/10 - no pain  -CP       Posttreatment Pain Rating  0/10 - no pain  -OC  0/10 - no pain  -CP          Oral Motor Structure and Function    Dentition Assessment  missing teeth  -OC  missing teeth  -CP       Secretion Management  WNL/WFL  -OC  WNL/WFL  -CP       Mucosal Quality  moist, healthy  -OC  moist, healthy  -CP          Oral Musculature and Cranial Nerve Assessment    Oral Motor General Assessment  oral labial or buccal impairment;lingual impairment  -OC  oral labial or buccal impairment;lingual impairment  -CP       Oral Labial or Buccal Impairment, Detail, Cranial Nerve VII (Facial):  left labial droop  -OC  left labial droop  -CP       Lingual Impairment, Detail. Cranial Nerves IX, XII (Glossopharyngeal and Hypoglossal)  reduced lingual ROM;reduced strength  -OC  reduced lingual ROM;reduced strength  -CP          General  Eating/Swallowing Observations    Respiratory Support Currently in Use  --  room air  -CP       Eating/Swallowing Skills  --  fed by SLP  -CP       Positioning During Eating  --  upright in bed  -CP       Utensils Used  --  spoon;cup;straw  -CP       Consistencies Trialed  --  thin liquids;nectar/syrup-thick liquids;pureed;regular textures  -CP       Pre SpO2 (%)  --  98  -CP       Post SpO2 (%)  --  98  -CP          Clinical Swallow Eval    Clinical Swallow Evaluation Summary  Re-eval of swallow completed. Patient demonstrated no overt s/s aspiration with ice chips, thin via straw, puree, and mechanical soft. Prolonged but functional mastication, no oral residue. No overt s/s apspiration on single sips of thin or continuous swallows via straw.   -OC  Clinical swallow eval completed. Overt cough noted with thin liquids via straw x2, appearing d/t reduced coordination/mistiming. Unable to drink from cup without gross anterior loss (may be impacted by fresh sores and swelling on lips). No s/s with NTL, puree, or dry solid. Mastication appeared WFL without oral residue. Pt indicated that he was on a pureed diet at his facility, and that he did not know why. Spoke with pt's brother via phone who confirmed this recent change, and reported it was due to poor intake only. When asked whether this appeared to improve intake, he stated he hadn't heard. Suggest diet advancement to mech soft (to be least restrictive as well as safe, and for QOL), with downgrade to NTL at this time. Meds crushed with puree. Pt will need 1:1 assist with PO. Ensure pt take only small bites and sips, and use slow rate, monitoring for pocketing. Will re-eval in 1-2 days. With any concerns re: tolerance of mech soft, downgrade to puree.  -CP          Clinical Impression    SLP Swallowing Diagnosis  oral dysphagia;R/O pharyngeal dysphagia  -OC  oral dysphagia;suspected pharyngeal dysphagia  -CP       Functional Impact  risk of aspiration/pneumonia   -OC  risk of aspiration/pneumonia  -CP       Rehab Potential/Prognosis, Swallowing  good, to achieve stated therapy goals  -OC  good, to achieve stated therapy goals  -CP       Swallow Criteria for Skilled Therapeutic Interventions Met  demonstrates skilled criteria  -OC  demonstrates skilled criteria  -CP          Recommendations    Therapy Frequency (Swallow)  PRN  -OC  PRN  -CP       Predicted Duration Therapy Intervention (Days)  until discharge  -OC  until discharge  -CP       SLP Diet Recommendation  mechanical soft with no mixed consistencies;thin liquids  -OC  mechanical soft with no mixed consistencies;nectar thick liquids  -CP       Recommended Diagnostics  --  reassess via clinical swallow evaluation  -CP       Recommended Precautions and Strategies  upright posture during/after eating;small bites of food and sips of liquid;check mouth frequently for oral residue/pocketing;1:1 supervision;assist with feeding  -OC  upright posture during/after eating;small bites of food and sips of liquid;check mouth frequently for oral residue/pocketing;1:1 supervision;assist with feeding  -CP       Oral Care Recommendations  Oral Care before breakfast, after meals and PRN  -OC  Oral Care before breakfast, after meals and PRN  -CP       SLP Rec. for Method of Medication Administration  meds whole;with pudding or applesauce;as tolerated  -OC  meds crushed;with pudding or applesauce  -CP       Monitor for Signs of Aspiration  yes;notify SLP if any concerns  -OC  yes;notify SLP if any concerns  -CP       Anticipated Discharge Disposition (SLP)  extended care facility  -OC  extended care facility  -CP          Swallow Goals (SLP)    Oral Nutrition/Hydration Goal Selection (SLP)  --  oral nutrition/hydration, SLP goal 1  -CP          Oral Nutrition/Hydration Goal 1 (SLP)    Oral Nutrition/Hydration Goal 1, SLP  Tolerate least restrictive diet  -OC  Tolerate least restrictive diet  -CP       Time Frame (Oral Nutrition/Hydration  Goal 1, SLP)  by discharge  -OC  by discharge  -CP         User Key  (r) = Recorded By, (t) = Taken By, (c) = Cosigned By    Initials Name Effective Dates    OC Karina Tolentino MA,[facility]-SLP 06/08/18 -     CP Dalila Calero MS CCC-SLP 06/08/18 -           EDUCATION  The patient has been educated in the following areas:   Dysphagia (Swallowing Impairment).    SLP Recommendation and Plan  SLP Swallowing Diagnosis: oral dysphagia, R/O pharyngeal dysphagia  SLP Diet Recommendation: mechanical soft with no mixed consistencies, thin liquids  Recommended Precautions and Strategies: upright posture during/after eating, small bites of food and sips of liquid, check mouth frequently for oral residue/pocketing, 1:1 supervision, assist with feeding  SLP Rec. for Method of Medication Administration: meds whole, with pudding or applesauce, as tolerated     Monitor for Signs of Aspiration: yes, notify SLP if any concerns     Swallow Criteria for Skilled Therapeutic Interventions Met: demonstrates skilled criteria  Anticipated Discharge Disposition (SLP): extended care facility  Rehab Potential/Prognosis, Swallowing: good, to achieve stated therapy goals  Therapy Frequency (Swallow): PRN  Predicted Duration Therapy Intervention (Days): until discharge                         Plan of Care Reviewed With: patient  Outcome Summary: Re-eval of swallow completed. Recommend upgrade to thin liquids with mechanical soft no mixed consistencies. Meds whole or crushed with puree.  Recommend 1:1 assist, liquid via straw, upright, slow rate. SLP to follow for diet tolerance.    SLP GOALS     Row Name 11/10/20 1400 11/09/20 0900          Oral Nutrition/Hydration Goal 1 (SLP)    Oral Nutrition/Hydration Goal 1, SLP  Tolerate least restrictive diet  -OC  Tolerate least restrictive diet  -CP     Time Frame (Oral Nutrition/Hydration Goal 1, SLP)  by discharge  -OC  by discharge  -CP       User Key  (r) = Recorded By, (t) = Taken By, (c) = Cosigned By     Initials Name Provider Type    Karina Gonzales MA,CCC-SLP Speech and Language Pathologist    CP Dalila Calero MS CCC-SLP Speech and Language Pathologist           SLP Outcome Measures (last 72 hours)      SLP Outcome Measures     Row Name 11/10/20 1400 11/09/20 1000          SLP Outcome Measures    Outcome Measure Used?  Adult NOMS  -OC  Adult NOMS  -CP        Adult FCM Scores    FCM Chosen  Swallowing  -OC  Swallowing  -CP     Swallowing FCM Score  4  -OC  4  -CP       User Key  (r) = Recorded By, (t) = Taken By, (c) = Cosigned By    Initials Name Effective Dates    Karina Gonzales MA,CCC-SLP 06/08/18 -     CP Dalila Calero MS CCC-SLP 06/08/18 -            Time Calculation:   Time Calculation- SLP     Row Name 11/10/20 1521             Time Calculation- SLP    SLP Start Time  1400  -OC      SLP Received On  11/10/20  -OC        User Key  (r) = Recorded By, (t) = Taken By, (c) = Cosigned By    Initials Name Provider Type    Karina Gonzales MA,CCC-SLP Speech and Language Pathologist          Therapy Charges for Today     Code Description Service Date Service Provider Modifiers Qty    [id number] HC ST TREATMENT SWALLOW 3 11/10/2020 Karina Tolentino MA,CCC-SLP GN 1               Karina Tolentino MA, CCC-ISAAK  11/10/2020

## 2020-11-10 NOTE — PROGRESS NOTES
DAILY PROGRESS NOTE  [facility], [facility]    11/10/2020    Patient Identification:  Name: Trever Petit  Age: 64 y.o.  Sex: male  :  1955  MRN: [id number]           Primary Care Physician: George Ware MD    Subjective:    Interval History:    Patient is awake today, not much communicative this morning.  Bradycardic in the mid 40's to low 50s.  Rest of the vital signs stable.  Saturation room air 97-99%.  Diet change as per speech therapy  Sodium 139, creatinine 0.65, hemoglobin 10.5, urinalysis shows numerous to count WBC.  Culture pending      ROS:     There is no report for nausea, vomiting, diarrhea, constipation, chest pain, shortness of breath.    Objective:    Scheduled Meds:  amLODIPine, 5 mg, Oral, Q24H  aspirin, 81 mg, Oral, Daily  atorvastatin, 20 mg, Oral, Daily  divalproex, 250 mg, Oral, TID  insulin lispro, 0-7 Units, Subcutaneous, TID AC  lisinopril, 5 mg, Oral, Nightly  melatonin, 5 mg, Oral, Nightly  OXcarbazepine, 600 mg, Oral, Q12H  PARoxetine, 10 mg, Oral, QAM  tamsulosin, 0.4 mg, Oral, Nightly        Continuous Infusions:  niCARdipine, 5-15 mg/hr, Last Rate: Stopped (20 0715)  sodium chloride, 75 mL/hr, Last Rate: 75 mL/hr (20)        PRN Meds:  •  acetaminophen  •  dextrose  •  dextrose  •  glucagon (human recombinant)  •  sodium chloride  •  [COMPLETED] Insert peripheral IV **AND** sodium chloride    Intake/Output:    Intake/Output Summary (Last 24 hours) at 11/10/2020 0927  Last data filed at 2020  Gross per 24 hour   Intake 1160 ml   Output --   Net 1160 ml         Exam:    tMax 24 hrs: Temp (24hrs), Av.4 °F (36.3 °C), Min:96.6 °F (35.9 °C), Max:98.1 °F (36.7 °C)    Vitals Ranges:   Temp:  [96.6 °F (35.9 °C)-98.1 °F (36.7 °C)] 98.1 °F (36.7 °C)  Heart Rate:  [44-50] 44  Resp:  [12-18] 18  BP: ()/(60-92) 142/92    /92 (BP Location: Left arm, Patient Position: Lying)   Pulse (!) 44   Temp  "98.1 °F (36.7 °C) (Oral)   Resp 18   Ht 172.7 cm (67.99\")   Wt 55.8 kg (123 lb)   SpO2 98%   BMI 18.71 kg/m²     General: Patient is sleepy,  Oriented x 1-2. No respiratory distress  Oropharynx: Contusion and abrasion of the left aspect of patient's chin.  Poor dentition.  Laceration intraorally at the frenulum.  Contusion of left sided lip.  Neck: Supple, symmetrical, trachea midline, no adenopathy;              thyroid:  no enlargement/tenderness/nodules;              no carotid bruit or JVD  Cardiovascular: Normal rate, regular rhythm and intact distal pulses.              Exam reveals no gallop and no friction rub. No murmur heard  Pulmonary: Clear to auscultation bilaterally, respirations unlabored.               No rhonchi, wheezing or rales.   Abdominal: Soft, nontender, bowel sounds active all four quadrants,     no masses, no hepatomegaly, no splenomegaly.  Cuellar in place  Extremities: Normal, atraumatic, able to move upper extremity, no much movement of the lower extremity.  Pulses: 2 + symmetric all extremities  Neurological: Patient is awake, responsive to questioning, oriented x1-2.  No new focal sensorimotor deficit.    Skin: Skin color, texture, normal. Turgor is decreased. No rashes or lesions       Data Review:    Results from last 7 days   Lab Units 11/10/20  0512 11/09/20  0520 11/08/20  1849   WBC 10*3/mm3 6.85 10.22 7.85   HEMOGLOBIN g/dL 10.5* 12.0* 12.0*   HEMATOCRIT % 30.4* 34.8* 35.0*   PLATELETS 10*3/mm3 201 217 207       Results from last 7 days   Lab Units 11/10/20  0512 11/09/20  0520 11/08/20  1849   SODIUM mmol/L 129* 130* 131*   POTASSIUM mmol/L 4.3 4.4 5.1   CHLORIDE mmol/L 97* 94* 93*   CO2 mmol/L 25.2 28.4 32.1*   BUN mg/dL 19 16 20   CREATININE mg/dL 0.65* 0.60* 0.82   CALCIUM mg/dL 8.2* 8.6 8.6   BILIRUBIN mg/dL  --   --  <0.2   ALK PHOS U/L  --   --  49   ALT (SGPT) U/L  --   --  15   AST (SGOT) U/L  --   --  18   GLUCOSE mg/dL 77 88 95           Results from last 7 days "   Lab Units 11/09/20  0520   HEMOGLOBIN A1C % 5.18       Lab Results   Lab Value Date/Time    TROPONINT 0.019 05/23/2020 0921    TROPONINT <0.010 08/30/2019 0443    TROPONINT <0.010 08/28/2019 1308    TROPONINT <0.010 08/19/2019 1727    TROPONINT <0.010 08/19/2019 1538    TROPONINT <0.010 06/25/2019 1703    TROPONINT <0.010 06/25/2019 1502    TROPONINT <0.010 06/22/2019 0028    TROPONINT <0.010 06/21/2019 1846       Microbiology Results (last 10 days)     Procedure Component Value - Date/Time    COVID PRE-OP / PRE-PROCEDURE SCREENING ORDER (NO ISOLATION) - Swab, Nasopharynx [[id number]]  (Normal) Collected: 11/08/20 2333    Lab Status: Final result Specimen: Swab from Nasopharynx Updated: 11/09/20 0053    Narrative:      The following orders were created for panel order COVID PRE-OP / PRE-PROCEDURE SCREENING ORDER (NO ISOLATION) - Swab, Nasopharynx.  Procedure                               Abnormality         Status                     ---------                               -----------         ------                     Respiratory Panel PCR w/...[[id number]]  Normal              Final result                 Please view results for these tests on the individual orders.    Respiratory Panel PCR w/COVID-19(SARS-CoV-2) [facility] In-House, NP Swab in UTM/VTM, 3-4 HR TAT - Swab, Nasopharynx [[id number]]  (Normal) Collected: 11/08/20 2333    Lab Status: Final result Specimen: Swab from Nasopharynx Updated: 11/09/20 0053     ADENOVIRUS, PCR Not Detected     Coronavirus 229E Not Detected     Coronavirus HKU1 Not Detected     Coronavirus NL63 Not Detected     Coronavirus OC43 Not Detected     COVID19 Not Detected     Human Metapneumovirus Not Detected     Human Rhinovirus/Enterovirus Not Detected     Influenza A PCR Not Detected     Influenza B PCR Not Detected     Parainfluenza Virus 1 Not Detected     Parainfluenza Virus 2 Not Detected     Parainfluenza Virus 3 Not Detected     Parainfluenza Virus 4 Not  Detected     RSV, PCR Not Detected     Bordetella pertussis pcr Not Detected     Bordetella parapertussis PCR Not Detected     Chlamydophila pneumoniae PCR Not Detected     Mycoplasma pneumo by PCR Not Detected    Narrative:      Fact sheet for providers: https://docs.[facility]/wp-content/uploads/CAM3759-8179-ZR7.1-EUA-Provider-Fact-Sheet-3.pdf    Fact sheet for patients: https://docs.[facility]/wp-content/uploads/QWL4434-3606-DL7.1-EUA-Patient-Fact-Sheet-1.pdf           Imaging Results (Last 7 Days)     Procedure Component Value Units Date/Time    CT Head Without Contrast [[id number]] Collected: 11/08/20 2217     Updated: 11/08/20 2231    Narrative:      CT HEAD WITHOUT CONTRAST:      HISTORY:  Fall out of bed from nursing home.     TECHNIQUE:  Axial images were obtained through the brain without  contrast administration. Multiplanar reformatted images were  reconstructed from the helical source data. Radiation dose reduction  techniques were utilized, including automated exposure control and  exposure modulation based on body size.        COMPARISON: 05/23/2020.     FINDINGS:   Extensive encephalomalacia throughout the right hemisphere from prior  right MCA infarction.     Stable ex vacuo dilatation of the right lateral ventricle.     Stable moderate cerebral volume loss with proportionate prominence of  the remaining ventricular system and sulci. No hydrocephalus or midline  shift.      Stable moderate scattered hypodensity throughout the periventricular and  subcortical white matter that is most likely secondary to chronic  microvascular ischemia. There is no evidence of a large territorial  infarction. No hemorrhage or extra-axial fluid collection. Intracranial  atherosclerotic arterial calcifications.      The orbits are unremarkable. The visualized paranasal sinuses and left  mastoid air cells are clear. Moderate opacification of the right mastoid  air cells.       The calvarium is intact. The scalp  soft tissues are unremarkable.       Impression:      1.   No acute intracranial abnormality.   2.  Sequela of prior right MCA infarction.  3.  Moderate right mastoid effusion.  4.  Senescent changes.           CERVICAL SPINE CT:     HISTORY:  Follow-up from nursing home.     TECHNIQUE:  Helical axial images were performed from the skull base  through the upper thoracic spine without contrast. Sagittal and coronal  reformatted images were performed. Radiation dose reduction techniques  were utilized, including automated exposure control and exposure  modulation based on body size.        COMPARISON:  05/23/2020.     FINDINGS:    Chronic fracture of the left C1 ring with healing along the anterior  aspect and nonunion at the posterior aspect. No evidence of acute  fracture. Stable fusion hardware at C4-C7 without hardware complication.  Straightening of the normal cervical lordosis. Stable multilevel  spondylosis. No high-grade spinal canal narrowing. The paravertebral  soft tissues are normal. The lung apices are clear.     IMPRESSION:   1.  No evidence of acute fracture.  2.  Chronic left C1 fracture with partial healing and evidence of  nonunion.  3.  Stable fusion hardware without competition.     This report was finalized on 11/8/2020 10:28 PM by Dr. Shaheen Olea M.D.       CT Cervical Spine Without Contrast [[id number]] Collected: 11/08/20 2217     Updated: 11/08/20 2231    Narrative:      CT HEAD WITHOUT CONTRAST:      HISTORY:  Fall out of bed from nursing home.     TECHNIQUE:  Axial images were obtained through the brain without  contrast administration. Multiplanar reformatted images were  reconstructed from the helical source data. Radiation dose reduction  techniques were utilized, including automated exposure control and  exposure modulation based on body size.        COMPARISON: 05/23/2020.     FINDINGS:   Extensive encephalomalacia throughout the right hemisphere from prior  right MCA infarction.      Stable ex vacuo dilatation of the right lateral ventricle.     Stable moderate cerebral volume loss with proportionate prominence of  the remaining ventricular system and sulci. No hydrocephalus or midline  shift.      Stable moderate scattered hypodensity throughout the periventricular and  subcortical white matter that is most likely secondary to chronic  microvascular ischemia. There is no evidence of a large territorial  infarction. No hemorrhage or extra-axial fluid collection. Intracranial  atherosclerotic arterial calcifications.      The orbits are unremarkable. The visualized paranasal sinuses and left  mastoid air cells are clear. Moderate opacification of the right mastoid  air cells.       The calvarium is intact. The scalp soft tissues are unremarkable.       Impression:      1.   No acute intracranial abnormality.   2.  Sequela of prior right MCA infarction.  3.  Moderate right mastoid effusion.  4.  Senescent changes.           CERVICAL SPINE CT:     HISTORY:  Follow-up from nursing home.     TECHNIQUE:  Helical axial images were performed from the skull base  through the upper thoracic spine without contrast. Sagittal and coronal  reformatted images were performed. Radiation dose reduction techniques  were utilized, including automated exposure control and exposure  modulation based on body size.        COMPARISON:  05/23/2020.     FINDINGS:    Chronic fracture of the left C1 ring with healing along the anterior  aspect and nonunion at the posterior aspect. No evidence of acute  fracture. Stable fusion hardware at C4-C7 without hardware complication.  Straightening of the normal cervical lordosis. Stable multilevel  spondylosis. No high-grade spinal canal narrowing. The paravertebral  soft tissues are normal. The lung apices are clear.     IMPRESSION:   1.  No evidence of acute fracture.  2.  Chronic left C1 fracture with partial healing and evidence of  nonunion.  3.  Stable fusion hardware  without competition.     This report was finalized on 11/8/2020 10:28 PM by Dr. Shaheen Olea M.D.               Assessment:        Malignant hypertensive urgency    Severe malnutrition (CMS/HCC)  Urinary tract infection  Dehydration  Chronic bradycardia  Face contusion  Probably concussion  Vascular dementia with visual disturbance  COPD  Diabetes mellitus  Hypertension  Late effects of CVA      Plan:    Continue IV fluids, still hyponatremic  Start IV antibiotics, patient has urinary tract infection, contributing to his mental status changes  Continue to monitor mental status  Monitor and correct electrolytes, correct hyponatremia  Adjust insulin as needed  ST consult appreciated  DVT/stress ulcer prophylaxis  Labs in       George Ware MD  11/10/2020  09:27 EST

## 2020-11-11 PROBLEM — R13.10 DYSPHAGIA: Status: ACTIVE | Noted: 2020-11-11

## 2020-11-11 LAB
ANION GAP SERPL CALCULATED.3IONS-SCNC: 8.4 MMOL/L (ref 5–15)
BUN SERPL-MCNC: 17 MG/DL (ref 8–23)
BUN/CREAT SERPL: 28.3 (ref 7–25)
CALCIUM SPEC-SCNC: 8 MG/DL (ref 8.6–10.5)
CHLORIDE SERPL-SCNC: 97 MMOL/L (ref 98–107)
CHLORIDE UR-SCNC: 135 MMOL/L
CO2 SERPL-SCNC: 25.6 MMOL/L (ref 22–29)
CREAT SERPL-MCNC: 0.6 MG/DL (ref 0.76–1.27)
CREAT UR-MCNC: 41.9 MG/DL
DEPRECATED RDW RBC AUTO: 40.1 FL (ref 37–54)
ERYTHROCYTE [DISTWIDTH] IN BLOOD BY AUTOMATED COUNT: 11.7 % (ref 12.3–15.4)
GFR SERPL CREATININE-BSD FRML MDRD: 136 ML/MIN/1.73
GLUCOSE BLDC GLUCOMTR-MCNC: 102 MG/DL (ref 70–130)
GLUCOSE BLDC GLUCOMTR-MCNC: 120 MG/DL (ref 70–130)
GLUCOSE BLDC GLUCOMTR-MCNC: 126 MG/DL (ref 70–130)
GLUCOSE BLDC GLUCOMTR-MCNC: 92 MG/DL (ref 70–130)
GLUCOSE SERPL-MCNC: 97 MG/DL (ref 65–99)
HCT VFR BLD AUTO: 30.4 % (ref 37.5–51)
HGB BLD-MCNC: 10.6 G/DL (ref 13–17.7)
MCH RBC QN AUTO: 33.1 PG (ref 26.6–33)
MCHC RBC AUTO-ENTMCNC: 34.9 G/DL (ref 31.5–35.7)
MCV RBC AUTO: 95 FL (ref 79–97)
OSMOLALITY SERPL: 279 MOSM/KG (ref 280–301)
OSMOLALITY UR: 520 MOSM/KG
PLATELET # BLD AUTO: 227 10*3/MM3 (ref 140–450)
PMV BLD AUTO: 9.1 FL (ref 6–12)
POTASSIUM SERPL-SCNC: 4.4 MMOL/L (ref 3.5–5.2)
RBC # BLD AUTO: 3.2 10*6/MM3 (ref 4.14–5.8)
SODIUM SERPL-SCNC: 131 MMOL/L (ref 136–145)
SODIUM UR-SCNC: 147 MMOL/L
TSH SERPL DL<=0.05 MIU/L-ACNC: 1.54 UIU/ML (ref 0.27–4.2)
URATE SERPL-MCNC: 2.9 MG/DL (ref 3.4–7)
WBC # BLD AUTO: 8.48 10*3/MM3 (ref 3.4–10.8)

## 2020-11-11 PROCEDURE — 97165 OT EVAL LOW COMPLEX 30 MIN: CPT | Performed by: OCCUPATIONAL THERAPIST

## 2020-11-11 PROCEDURE — 83935 ASSAY OF URINE OSMOLALITY: CPT | Performed by: INTERNAL MEDICINE

## 2020-11-11 PROCEDURE — 85027 COMPLETE CBC AUTOMATED: CPT | Performed by: INTERNAL MEDICINE

## 2020-11-11 PROCEDURE — 82436 ASSAY OF URINE CHLORIDE: CPT | Performed by: INTERNAL MEDICINE

## 2020-11-11 PROCEDURE — 25010000002 VANCOMYCIN 10 G RECONSTITUTED SOLUTION: Performed by: INTERNAL MEDICINE

## 2020-11-11 PROCEDURE — 25010000002 CEFTRIAXONE PER 250 MG: Performed by: INTERNAL MEDICINE

## 2020-11-11 PROCEDURE — 84443 ASSAY THYROID STIM HORMONE: CPT | Performed by: INTERNAL MEDICINE

## 2020-11-11 PROCEDURE — 84300 ASSAY OF URINE SODIUM: CPT | Performed by: INTERNAL MEDICINE

## 2020-11-11 PROCEDURE — 82962 GLUCOSE BLOOD TEST: CPT

## 2020-11-11 PROCEDURE — 84550 ASSAY OF BLOOD/URIC ACID: CPT | Performed by: INTERNAL MEDICINE

## 2020-11-11 PROCEDURE — 80048 BASIC METABOLIC PNL TOTAL CA: CPT | Performed by: INTERNAL MEDICINE

## 2020-11-11 PROCEDURE — 83930 ASSAY OF BLOOD OSMOLALITY: CPT | Performed by: INTERNAL MEDICINE

## 2020-11-11 PROCEDURE — 82570 ASSAY OF URINE CREATININE: CPT | Performed by: INTERNAL MEDICINE

## 2020-11-11 RX ORDER — VANCOMYCIN HYDROCHLORIDE 1 G/200ML
1000 INJECTION, SOLUTION INTRAVENOUS EVERY 12 HOURS
Status: DISCONTINUED | OUTPATIENT
Start: 2020-11-12 | End: 2020-11-13

## 2020-11-11 RX ADMIN — CEFTRIAXONE SODIUM 1 G: 1 INJECTION, SOLUTION INTRAVENOUS at 11:00

## 2020-11-11 RX ADMIN — TAMSULOSIN HYDROCHLORIDE 0.4 MG: 0.4 CAPSULE ORAL at 21:31

## 2020-11-11 RX ADMIN — OXCARBAZEPINE 600 MG: 300 TABLET, FILM COATED ORAL at 09:01

## 2020-11-11 RX ADMIN — DIVALPROEX SODIUM 250 MG: 250 TABLET, DELAYED RELEASE ORAL at 09:00

## 2020-11-11 RX ADMIN — VANCOMYCIN HYDROCHLORIDE 1250 MG: 10 INJECTION, POWDER, LYOPHILIZED, FOR SOLUTION INTRAVENOUS at 16:49

## 2020-11-11 RX ADMIN — PAROXETINE HYDROCHLORIDE HEMIHYDRATE 10 MG: 10 TABLET, FILM COATED ORAL at 09:00

## 2020-11-11 RX ADMIN — Medication 5 MG: at 21:31

## 2020-11-11 RX ADMIN — LISINOPRIL 5 MG: 5 TABLET ORAL at 21:32

## 2020-11-11 RX ADMIN — ATORVASTATIN CALCIUM 20 MG: 20 TABLET, FILM COATED ORAL at 09:00

## 2020-11-11 RX ADMIN — ASPIRIN 81 MG: 81 TABLET, FILM COATED ORAL at 09:00

## 2020-11-11 RX ADMIN — DIVALPROEX SODIUM 250 MG: 250 TABLET, DELAYED RELEASE ORAL at 23:29

## 2020-11-11 RX ADMIN — ACETAMINOPHEN 650 MG: 325 TABLET, FILM COATED ORAL at 21:38

## 2020-11-11 RX ADMIN — OXCARBAZEPINE 600 MG: 300 TABLET, FILM COATED ORAL at 21:31

## 2020-11-11 RX ADMIN — DIVALPROEX SODIUM 250 MG: 250 TABLET, DELAYED RELEASE ORAL at 16:49

## 2020-11-11 RX ADMIN — AMLODIPINE BESYLATE 5 MG: 5 TABLET ORAL at 09:00

## 2020-11-11 NOTE — PLAN OF CARE
Goal Outcome Evaluation:  Plan of Care Reviewed With: patient  Progress: improving  Vitals stable. Patient sleeping for most of the day. No distress noted. IV infusing at 75 cc / hr. IV Rocephin ordered and given. Total feed all meals. Tolerating mechanical soft diet with thin liquids. Turned every 2 hours. Incontinent care given every 2 hours. Bed alarm on. Falls protocol maintained. Will continue to monitor.

## 2020-11-11 NOTE — PROGRESS NOTES
DAILY PROGRESS NOTE  [facility], [facility]    2020    Patient Identification:  Name: Trever Petit  Age: 64 y.o.  Sex: male  :  1955  MRN: [id number]           Primary Care Physician: George Ware MD    Subjective:    Interval History:    Patient is more awake today, heart rate better, in the mid 60s lower 70s.  Saturation room air 90%.  Blood pressure improving slowly as well.  Studies hemoglobin 131, creatinine 0.6, hemoglobin 10.6.  Urine culture gram-positive cocci.  Had swallowing evaluation, diet upgraded    ROS:     There is no report for nausea, vomiting, diarrhea, constipation, chest pain, shortness of breath.    Objective:    Scheduled Meds:  amLODIPine, 5 mg, Oral, Q24H  aspirin, 81 mg, Oral, Daily  atorvastatin, 20 mg, Oral, Daily  cefTRIAXone, 1 g, Intravenous, Q24H  divalproex, 250 mg, Oral, TID  insulin lispro, 0-7 Units, Subcutaneous, TID AC  lisinopril, 5 mg, Oral, Nightly  melatonin, 5 mg, Oral, Nightly  OXcarbazepine, 600 mg, Oral, Q12H  PARoxetine, 10 mg, Oral, QAM  tamsulosin, 0.4 mg, Oral, Nightly        Continuous Infusions:  sodium chloride, 75 mL/hr, Last Rate: 75 mL/hr (11/10/20 1413)        PRN Meds:  •  acetaminophen  •  dextrose  •  dextrose  •  glucagon (human recombinant)  •  sodium chloride  •  [COMPLETED] Insert peripheral IV **AND** sodium chloride    Intake/Output:    Intake/Output Summary (Last 24 hours) at 2020 1447  Last data filed at 2020 0928  Gross per 24 hour   Intake 990 ml   Output --   Net 990 ml         Exam:    tMax 24 hrs: Temp (24hrs), Av.1 °F (36.7 °C), Min:97.3 °F (36.3 °C), Max:98.5 °F (36.9 °C)    Vitals Ranges:   Temp:  [97.3 °F (36.3 °C)-98.5 °F (36.9 °C)] 98.5 °F (36.9 °C)  Heart Rate:  [44-73] 60  Resp:  [16-18] 18  BP: (148-155)/(75-88) 151/86    /86 (BP Location: Left arm, Patient Position: Lying)   Pulse 60   Temp 98.5 °F (36.9 °C) (Oral)   Resp 18   Ht 172.7 cm  "(67.99\")   Wt 55.8 kg (123 lb)   SpO2 98%   BMI 18.71 kg/m²     General: Patient is awake, Oriented x 1-2. No respiratory distress  Oropharynx: Contusion and abrasion of the left aspect of patient's chin.  Poor dentition.  Laceration intraorally at the frenulum, healing.  Contusion of left sided lip.  Neck: Supple, symmetrical, trachea midline, no adenopathy;              thyroid:  no enlargement/tenderness/nodules;              no carotid bruit or JVD  Cardiovascular: Normal rate, regular rhythm and intact distal pulses.              Exam reveals no gallop and no friction rub. No murmur heard  Pulmonary: Clear to auscultation bilaterally, respirations unlabored.               No rhonchi, wheezing or rales.   Abdominal: Soft, nontender, bowel sounds active all four quadrants,     no masses, no hepatomegaly, no splenomegaly.  Cuellar in place  Extremities: Normal, atraumatic, able to move upper extremity, no much movement of the lower extremity.  Pulses: 2 + symmetric all extremities  Neurological: Patient is awake, responsive to questioning, oriented x1-2.  No new focal sensorimotor deficit.    Skin: Skin color, texture, normal. Turgor is decreased. No rashes or lesions       Data Review:    Results from last 7 days   Lab Units 11/11/20  0342 11/10/20  0512 11/09/20  0520   WBC 10*3/mm3 8.48 6.85 10.22   HEMOGLOBIN g/dL 10.6* 10.5* 12.0*   HEMATOCRIT % 30.4* 30.4* 34.8*   PLATELETS 10*3/mm3 227 201 217       Results from last 7 days   Lab Units 11/11/20  0342 11/10/20  0512 11/09/20  0520 11/08/20  1849   SODIUM mmol/L 131* 129* 130* 131*   POTASSIUM mmol/L 4.4 4.3 4.4 5.1   CHLORIDE mmol/L 97* 97* 94* 93*   CO2 mmol/L 25.6 25.2 28.4 32.1*   BUN mg/dL 17 19 16 20   CREATININE mg/dL 0.60* 0.65* 0.60* 0.82   CALCIUM mg/dL 8.0* 8.2* 8.6 8.6   BILIRUBIN mg/dL  --   --   --  <0.2   ALK PHOS U/L  --   --   --  49   ALT (SGPT) U/L  --   --   --  15   AST (SGOT) U/L  --   --   --  18   GLUCOSE mg/dL 97 77 88 95       "     Results from last 7 days   Lab Units 11/09/20  0520   HEMOGLOBIN A1C % 5.18       Lab Results   Lab Value Date/Time    TROPONINT 0.019 05/23/2020 0921    TROPONINT <0.010 08/30/2019 0443    TROPONINT <0.010 08/28/2019 1308    TROPONINT <0.010 08/19/2019 1727    TROPONINT <0.010 08/19/2019 1538    TROPONINT <0.010 06/25/2019 1703    TROPONINT <0.010 06/25/2019 1502    TROPONINT <0.010 06/22/2019 0028    TROPONINT <0.010 06/21/2019 1846       Microbiology Results (last 10 days)     Procedure Component Value - Date/Time    Urine Culture - Urine, Urine, Catheter In/Out [[id number]]  (Abnormal) Collected: 11/09/20 2102    Lab Status: Preliminary result Specimen: Urine, Catheter In/Out Updated: 11/11/20 1010     Urine Culture >100,000 CFU/mL Gram Positive Cocci    COVID PRE-OP / PRE-PROCEDURE SCREENING ORDER (NO ISOLATION) - Swab, Nasopharynx [[id number]]  (Normal) Collected: 11/08/20 2333    Lab Status: Final result Specimen: Swab from Nasopharynx Updated: 11/09/20 0053    Narrative:      The following orders were created for panel order COVID PRE-OP / PRE-PROCEDURE SCREENING ORDER (NO ISOLATION) - Swab, Nasopharynx.  Procedure                               Abnormality         Status                     ---------                               -----------         ------                     Respiratory Panel PCR w/...[[id number]]  Normal              Final result                 Please view results for these tests on the individual orders.    Respiratory Panel PCR w/COVID-19(SARS-CoV-2) [facility] In-House, NP Swab in UTM/VTM, 3-4 HR TAT - Swab, Nasopharynx [[id number]]  (Normal) Collected: 11/08/20 2333    Lab Status: Final result Specimen: Swab from Nasopharynx Updated: 11/09/20 0053     ADENOVIRUS, PCR Not Detected     Coronavirus 229E Not Detected     Coronavirus HKU1 Not Detected     Coronavirus NL63 Not Detected     Coronavirus OC43 Not Detected     COVID19 Not Detected     Human Metapneumovirus Not  Detected     Human Rhinovirus/Enterovirus Not Detected     Influenza A PCR Not Detected     Influenza B PCR Not Detected     Parainfluenza Virus 1 Not Detected     Parainfluenza Virus 2 Not Detected     Parainfluenza Virus 3 Not Detected     Parainfluenza Virus 4 Not Detected     RSV, PCR Not Detected     Bordetella pertussis pcr Not Detected     Bordetella parapertussis PCR Not Detected     Chlamydophila pneumoniae PCR Not Detected     Mycoplasma pneumo by PCR Not Detected    Narrative:      Fact sheet for providers: https://docs.[employer]/wp-content/uploads/BGY2069-9799-WG2.1-EUA-Provider-Fact-Sheet-3.pdf    Fact sheet for patients: https://docs.[employer]/wp-content/uploads/SBS5185-7826-OQ2.1-EUA-Patient-Fact-Sheet-1.pdf           Imaging Results (Last 7 Days)     Procedure Component Value Units Date/Time    CT Head Without Contrast [[id number]] Collected: 11/08/20 2217     Updated: 11/08/20 2231    Narrative:      CT HEAD WITHOUT CONTRAST:      HISTORY:  Fall out of bed from nursing home.     TECHNIQUE:  Axial images were obtained through the brain without  contrast administration. Multiplanar reformatted images were  reconstructed from the helical source data. Radiation dose reduction  techniques were utilized, including automated exposure control and  exposure modulation based on body size.        COMPARISON: 05/23/2020.     FINDINGS:   Extensive encephalomalacia throughout the right hemisphere from prior  right MCA infarction.     Stable ex vacuo dilatation of the right lateral ventricle.     Stable moderate cerebral volume loss with proportionate prominence of  the remaining ventricular system and sulci. No hydrocephalus or midline  shift.      Stable moderate scattered hypodensity throughout the periventricular and  subcortical white matter that is most likely secondary to chronic  microvascular ischemia. There is no evidence of a large territorial  infarction. No hemorrhage or extra-axial fluid  collection. Intracranial  atherosclerotic arterial calcifications.      The orbits are unremarkable. The visualized paranasal sinuses and left  mastoid air cells are clear. Moderate opacification of the right mastoid  air cells.       The calvarium is intact. The scalp soft tissues are unremarkable.       Impression:      1.   No acute intracranial abnormality.   2.  Sequela of prior right MCA infarction.  3.  Moderate right mastoid effusion.  4.  Senescent changes.           CERVICAL SPINE CT:     HISTORY:  Follow-up from nursing home.     TECHNIQUE:  Helical axial images were performed from the skull base  through the upper thoracic spine without contrast. Sagittal and coronal  reformatted images were performed. Radiation dose reduction techniques  were utilized, including automated exposure control and exposure  modulation based on body size.        COMPARISON:  05/23/2020.     FINDINGS:    Chronic fracture of the left C1 ring with healing along the anterior  aspect and nonunion at the posterior aspect. No evidence of acute  fracture. Stable fusion hardware at C4-C7 without hardware complication.  Straightening of the normal cervical lordosis. Stable multilevel  spondylosis. No high-grade spinal canal narrowing. The paravertebral  soft tissues are normal. The lung apices are clear.     IMPRESSION:   1.  No evidence of acute fracture.  2.  Chronic left C1 fracture with partial healing and evidence of  nonunion.  3.  Stable fusion hardware without competition.     This report was finalized on 11/8/2020 10:28 PM by Dr. Shaheen Olea M.D.       CT Cervical Spine Without Contrast [[id number]] Collected: 11/08/20 2217     Updated: 11/08/20 2231    Narrative:      CT HEAD WITHOUT CONTRAST:      HISTORY:  Fall out of bed from nursing home.     TECHNIQUE:  Axial images were obtained through the brain without  contrast administration. Multiplanar reformatted images were  reconstructed from the helical source data.  Radiation dose reduction  techniques were utilized, including automated exposure control and  exposure modulation based on body size.        COMPARISON: 05/23/2020.     FINDINGS:   Extensive encephalomalacia throughout the right hemisphere from prior  right MCA infarction.     Stable ex vacuo dilatation of the right lateral ventricle.     Stable moderate cerebral volume loss with proportionate prominence of  the remaining ventricular system and sulci. No hydrocephalus or midline  shift.      Stable moderate scattered hypodensity throughout the periventricular and  subcortical white matter that is most likely secondary to chronic  microvascular ischemia. There is no evidence of a large territorial  infarction. No hemorrhage or extra-axial fluid collection. Intracranial  atherosclerotic arterial calcifications.      The orbits are unremarkable. The visualized paranasal sinuses and left  mastoid air cells are clear. Moderate opacification of the right mastoid  air cells.       The calvarium is intact. The scalp soft tissues are unremarkable.       Impression:      1.   No acute intracranial abnormality.   2.  Sequela of prior right MCA infarction.  3.  Moderate right mastoid effusion.  4.  Senescent changes.           CERVICAL SPINE CT:     HISTORY:  Follow-up from nursing home.     TECHNIQUE:  Helical axial images were performed from the skull base  through the upper thoracic spine without contrast. Sagittal and coronal  reformatted images were performed. Radiation dose reduction techniques  were utilized, including automated exposure control and exposure  modulation based on body size.        COMPARISON:  05/23/2020.     FINDINGS:    Chronic fracture of the left C1 ring with healing along the anterior  aspect and nonunion at the posterior aspect. No evidence of acute  fracture. Stable fusion hardware at C4-C7 without hardware complication.  Straightening of the normal cervical lordosis. Stable  multilevel  spondylosis. No high-grade spinal canal narrowing. The paravertebral  soft tissues are normal. The lung apices are clear.     IMPRESSION:   1.  No evidence of acute fracture.  2.  Chronic left C1 fracture with partial healing and evidence of  nonunion.  3.  Stable fusion hardware without competition.     This report was finalized on 11/8/2020 10:28 PM by Dr. Shaheen Olea M.D.               Assessment:        Malignant hypertensive urgency    Severe malnutrition (CMS/HCC)  Urinary tract infection  Dehydration  Chronic bradycardia  Face contusion  Probably concussion  Vascular dementia with visual disturbance  COPD  Diabetes mellitus  Hypertension  Late effects of CVA  Persistent hyponatremia    Plan:    Patient eating okay, will DC IV fluids.  We will ask renal to see patient due to persistent hyponatremia  Change antibiotics to vancomycin, urine culture with gram-positive cocci  Continue to monitor mental status, improving  Adjust insulin as needed  DVT/stress ulcer prophylaxis  Labs in       George Ware MD  11/11/2020  14:47 EST

## 2020-11-11 NOTE — PLAN OF CARE
Goal Outcome Evaluation:  Plan of Care Reviewed With: patient  Progress: no change  Outcome Summary: Pt sleeping more this shift, awake and took meds in applesauce, incontinent of urine. No stool. Less talkative and unable to determine orientation. Episode of dry heaves after turning and repostioning. Clear and brownish watery and phlegm in emesis. About 100 cc total. Safety maintained.

## 2020-11-11 NOTE — THERAPY DISCHARGE NOTE
Acute Care - Occupational Therapy Discharge  [facility]    Patient Name: Trever Petit  : 1955    MRN: [id number]                              Today's Date: 2020       Admit Date: 2020    Visit Dx:     ICD-10-CM ICD-9-CM   1. Malignant hypertensive urgency  I16.0 401.0   2. Bradycardia chronic  R00.1 427.89   3. Contusion of face, initial encounter  S00.83XA 920   4. Dementia with behavioral disturbance, unspecified dementia type (CMS/[facility])  F03.91 294.21     Patient Active Problem List   Diagnosis   • Hypertensive emergency   • Essential hypertension   • Stroke (CMS/[facility])   • Hypoglycemia   • Vascular dementia (CMS/[facility])   • Type 2 diabetes mellitus, without long-term current use of insulin (CMS/[facility])   • Constipation   • Leukocytosis   • Fever   • Closed fracture of multiple ribs of left side   • Pleural effusion   • Left lower lobe pneumonia   • Cystitis   • Seizure-like activity (CMS/[facility])   • E. coli UTI (urinary tract infection)   • Fall   • C1 cervical fracture (CMS/[facility])   • Bradycardia   • Malignant hypertensive urgency   • Severe malnutrition (CMS/[facility])     Past Medical History:   Diagnosis Date   • JENI (acute kidney injury) (CMS/[facility]) 2019   • Anxiety    • Arthritis    • Bradycardia    • Carpal bone fracture    • Carpal tunnel syndrome    • Cerebrovascular accident (CMS/[facility])    • Constipation    • COPD (chronic obstructive pulmonary disease) (CMS/[facility])    • Coronary artery disease    • Diabetes mellitus (CMS/[facility])    • E. coli UTI (urinary tract infection) 2019   • Elevated cholesterol    • Hepatitis B    • Hepatitis-C    • Hypertension    • Myocardial infarction (CMS/[facility])    • Neuropathy    • Seizure-like activity (CMS/[facility]) 2019   • Spinal stenosis    • Stroke (CMS/[facility])    • Type 2 diabetes mellitus, without long-term current use of insulin (CMS/[facility]) 2019   • Vascular dementia (CMS/[facility]) 2019     Past Surgical History:   Procedure Laterality Date   • CARDIAC  CATHETERIZATION N/A 6/25/2019    Procedure: Left Heart Cath;  Surgeon: Chen Aguilar MD;  Location:  [facility] CATH INVASIVE LOCATION;  Service: Cardiovascular   • CARDIAC CATHETERIZATION N/A 6/25/2019    Procedure: Left ventriculography;  Surgeon: Chen Aguilar MD;  [facility];  Service: Cardiovascular   • CARDIAC CATHETERIZATION N/A 6/25/2019    Procedure: Coronary angiography;  Surgeon: Chen Aguilar MD;  Location:  [facility] CATH INVASIVE LOCATION;  Service: Cardiovascular   • CARPAL TUNNEL RELEASE     • CERVICAL FUSION     • NECK SURGERY       General Information     Row Name 11/11/20 1041          OT Time and Intention    Document Type  discharge evaluation/summary  -     Mode of Treatment  individual therapy;occupational therapy  -     Row Name 11/11/20 1041          General Information    Patient Profile Reviewed  yes  -     Prior Level of Function  -- appears to be dep prior, dep now .w feeding, stiff UE from LTC  -     Existing Precautions/Restrictions  fall  -       User Key  (r) = Recorded By, (t) = Taken By, (c) = Cosigned By    Initials Name Provider Type     Terri Elkins, OTR Occupational Therapist        Mobility/ADL's     Row Name 11/11/20 1042          Bed Mobility    Bed Mobility  rolling right;scooting/bridging  -     Rolling Right [place] (Bed Mobility)  set up;maximum assist (25% patient effort)  -     Scooting/Bridging [place] (Bed Mobility)  set up;2 person assist;maximum assist (25% patient effort)  -     Row Name 11/11/20 1042          Transfers    Comment (Transfers)  NT  -     Row Name 11/11/20 1042          Activities of Daily Living    BADL Assessment/Intervention  feeding;bathing  -     Row Name 11/11/20 1042          Self-Feeding Assessment/Training    Comment (Feeding)  dep wEmanuel nsg aide helping him.  -     Row Name 11/11/20 1042          Bathing Assessment/Intervention    Comment (Bathing)  stiff UEs, dep w.  ADLS  -       User Key  (r) = Recorded By, (t) = Taken By, (c) = Cosigned By    Initials Name Provider Type    Terri Benitez OTR Occupational Therapist        Obj/Interventions     Row Name 11/11/20 1042          Range of Motion Comprehensive    General Range of Motion  upper extremity range of motion deficits identified  -     Comment, General Range of Motion  very stiff UE, PROM 1/2, hard to open his hands.  -     Row Name 11/11/20 1042          Strength Comprehensive (MMT)    Comment, General Manual Muscle Testing (MMT) Assessment  unable to fully assess as he cannot move UEs on his own  -       User Key  (r) = Recorded By, (t) = Taken By, (c) = Cosigned By    Initials Name Provider Type    Terri Benitez, ALEXANDRE Occupational Therapist        Goals/Plan    No documentation.       Clinical Impression     [facility] Name 11/11/20 1043          Pain Scale: Numbers Pre/Post-Treatment    Pretreatment Pain Rating  0/10 - no pain  -     Posttreatment Pain Rating  0/10 - no pain  -[facility] Name 11/11/20 1043          Plan of Care Review    Plan of Care Reviewed With  patient  -     Progress  no change  -     Outcome Summary  pt evaluated for OT. pt has decr movement in UEs, stuff UEs, tight hands, and from LTC where he recieves care. He is dep here with feeding, and bathing ed. He was max x2 scooting up in bed and max A to roll R. He is not skilled OT appropriate, has a hard time following directions, able to say ok. Pt plan to return to LTC where he resides. OT signing off.  -     Row Name 11/11/20 1043          Therapy Assessment/Plan (OT)    Therapy Frequency (OT)  evaluation only  -[facility] Name 11/11/20 1043          Therapy Plan Review/Discharge Plan (OT)    Anticipated Discharge Disposition (OT)  extended care facility  -[facility] Name 11/11/20 1043          Positioning and Restraints    Pre-Treatment Position  in bed  -     Post Treatment Position  bed  -     In Bed   supine;call light within reach;encouraged to call for assist;exit alarm on  -       User Key  (r) = Recorded By, (t) = Taken By, (c) = Cosigned By    Initials Name Provider Type     Terri Elkins OTR Occupational Therapist        Outcome Measures     Row Name 11/11/20 1045          How much help from another is currently needed...    Putting on and taking off regular lower body clothing?  1  -KP     Bathing (including washing, rinsing, and drying)  1  -KP     Toileting (which includes using toilet bed pan or urinal)  1  -KP     Putting on and taking off regular upper body clothing  1  -     Taking care of personal grooming (such as brushing teeth)  1  -KP     Eating meals  1  -     AM-PAC 6 Clicks Score (OT)  6  -     Row Name 11/11/20 1045          Functional Assessment    Outcome Measure Options  AM-PAC 6 Clicks Daily Activity (OT)  -       User Key  (r) = Recorded By, (t) = Taken By, (c) = Cosigned By    Initials Name Provider Type     Terri Elkins OTR Occupational Therapist        Occupational Therapy Education                 Title: PT OT SLP Therapies (Resolved)     Topic: Occupational Therapy (Resolved)     Point: ADL training (Resolved)     Description:   Instruct learner(s) on proper safety adaptation and remediation techniques during self care or transfers.   Instruct in proper use of assistive devices.              Learning Progress Summary           Patient Acceptance, E,TB, VU,NR by  at 11/11/2020 1045    Comment: pt not verbal much. ed on OT And UE ex. but pt unable to complete.PROM fro mOT                               User Key     Initials Effective Dates Name Provider Type Discipline     06/08/18 -  Terri Elkins OTR Occupational Therapist OT              OT Recommendation and Plan  Retired Outcome Summary/Treatment Plan (OT)  Anticipated Discharge Disposition (OT): extended care facility  Therapy Frequency (OT): evaluation only  Plan of Care  Review  Plan of Care Reviewed With: patient  Progress: no change  Outcome Summary: pt evaluated for OT. pt has decr movement in UEs, stuff UEs, tight hands, and from LTC where he recieves care. He is dep here with feeding, and bathing ed. He was max x2 scooting up in bed and max A to roll R. He is not skilled OT appropriate, has a hard time following directions, able to say ok. Pt plan to return to LTC where he resides. OT signing off.  Plan of Care Reviewed With: patient  Outcome Summary: pt evaluated for OT. pt has decr movement in UEs, stuff UEs, tight hands, and from LTC where he recieves care. He is dep here with feeding, and bathing ed. He was max x2 scooting up in bed and max A to roll R. He is not skilled OT appropriate, has a hard time following directions, able to say ok. Pt plan to return to LTC where he resides. OT signing off.     Time Calculation:   Time Calculation- OT     Row Name 11/11/20 1046             Time Calculation- OT    OT Start Time  0950  -      OT Stop Time  1000  -      OT Time Calculation (min)  10 min  -      OT Received On  11/11/20  -        User Key  (r) = Recorded By, (t) = Taken By, (c) = Cosigned By    Initials Name Provider Type    Terri Benitez OTR Occupational Therapist        Therapy Charges for Today     Code Description Service Date Service Provider Modifiers Qty    66203092364 HC OT EVAL LOW COMPLEXITY 2 11/11/2020 Terri Elkins OTR GO 1               ALEXANDRE Valdez  11/11/2020

## 2020-11-11 NOTE — PROGRESS NOTES
[facility]  Clinical Pharmacy Department     Vancomycin Pharmacokinetic Note    Trever Petit is a 64 y.o. male who is on day 1 of pharmacy to dose vancomycin for UTI.    Target level: AUC24 400-600  Consulting Provider:  Jeanie   Current Vancomycin Dose:   TBD  Planned Duration of Therapy: 5 days  Other Antimicrobials: None    Allergies  Allergies as of 11/08/2020 - Reviewed 11/08/2020   Allergen Reaction Noted   • Clonidine derivatives Unknown (See Comments) 06/21/2019   • Hydrochlorothiazide Other (See Comments) 09/02/2015   • Metformin Unknown (See Comments) 09/05/2014   • Sitagliptin Nausea And Vomiting 07/15/2015       Microbiology:  Microbiology Results (last 10 days)     Procedure Component Value - Date/Time    Urine Culture - Urine, Urine, Catheter In/Out [[id number]]  (Abnormal) Collected: 11/09/20 2102    Lab Status: Preliminary result Specimen: Urine, Catheter In/Out Updated: 11/11/20 1010     Urine Culture >100,000 CFU/mL Gram Positive Cocci    COVID PRE-OP / PRE-PROCEDURE SCREENING ORDER (NO ISOLATION) - Swab, Nasopharynx [[id number]]  (Normal) Collected: 11/08/20 2333    Lab Status: Final result Specimen: Swab from Nasopharynx Updated: 11/09/20 0053    Narrative:      The following orders were created for panel order COVID PRE-OP / PRE-PROCEDURE SCREENING ORDER (NO ISOLATION) - Swab, Nasopharynx.  Procedure                               Abnormality         Status                     ---------                               -----------         ------                     Respiratory Panel PCR w/...[[id number]]  Normal              Final result                 Please view results for these tests on the individual orders.    Respiratory Panel PCR w/COVID-19(SARS-CoV-2) [facility] In-House, NP Swab in UTM/VTM, 3-4 HR TAT - Swab, Nasopharynx [[id number]]  (Normal) Collected: 11/08/20 2333    Lab Status: Final result Specimen: Swab from Nasopharynx Updated: 11/09/20 0053     ADENOVIRUS,  "PCR Not Detected     Coronavirus 229E Not Detected     Coronavirus HKU1 Not Detected     Coronavirus NL63 Not Detected     Coronavirus OC43 Not Detected     COVID19 Not Detected     Human Metapneumovirus Not Detected     Human Rhinovirus/Enterovirus Not Detected     Influenza A PCR Not Detected     Influenza B PCR Not Detected     Parainfluenza Virus 1 Not Detected     Parainfluenza Virus 2 Not Detected     Parainfluenza Virus 3 Not Detected     Parainfluenza Virus 4 Not Detected     RSV, PCR Not Detected     Bordetella pertussis pcr Not Detected     Bordetella parapertussis PCR Not Detected     Chlamydophila pneumoniae PCR Not Detected     Mycoplasma pneumo by PCR Not Detected    Narrative:      Fact sheet for providers: https://docs.[employer]/wp-content/uploads/QHA3430-2555-UP0.1-EUA-Provider-Fact-Sheet-3.pdf    Fact sheet for patients: https://docs.[employer]/wp-content/uploads/HGM5736-0096-TF2.1-EUA-Patient-Fact-Sheet-1.pdf            Vitals/Labs/I&O  172.7 cm (67.99\")  55.8 kg (123 lb)  Body mass index is 18.71 kg/m².   Temp:  [97.3 °F (36.3 °C)-98.5 °F (36.9 °C)] 98.5 °F (36.9 °C)  Heart Rate:  [44-73] 60  Resp:  [16-18] 18  BP: (148-155)/(75-88) 151/86    Results from last 7 days   Lab Units 11/11/20  0342 11/10/20  0512 11/09/20  0520   WBC 10*3/mm3 8.48 6.85 10.22                           Estimated Creatinine Clearance: 98.2 mL/min (A) (by C-G formula based on SCr of 0.6 mg/dL (L)).  Results from last 7 days   Lab Units 11/11/20  0342 11/10/20  0512 11/09/20  0520   BUN mg/dL 17 19 16   CREATININE mg/dL 0.60* 0.65* 0.60*     Intake & Output (last 3 days)       11/08 0701 - 11/09 0700 11/09 0701 - 11/10 0700 11/10 0701 - 11/11 0700 11/11 0701 - 11/12 0700    P.O.  160 1080 210    I.V. (mL/kg)  1000 (17.9) 800 (14.3)     IV Piggyback   50     Total Intake(mL/kg)  1160 (20.8) 1930 (34.6) 210 (3.8)    Net  +1160 +1930 +210            Urine Unmeasured Occurrence 2 x 4 x 9 x 5 x    Emesis Unmeasured " "Occurrence    1 x            Assessment/Plan:    Assessment:  Bayesian analysis of the most recent level(s) using [employer] provides the following patient-specific pharmacokinetic parameters:   CL: 3.8 L/h   Vd: 46.6 L   T1/2: 11 h  If the predicted trough on this regimen is not within what was previously considered a \"target trough range\", the AUC24 (a stronger predictor of vancomycin efficacy) is predicted to achieve the accepted target of 400-600 mg*h/L. To best balance efficacy and toxicity, we will be targeting AUC24 in this patient rather than steady-state trough levels.     Initiating the regimen of 1250 mg loading dose followed by 1000mg (18mg/kg) IV every 12 hours gives a predicted steady-state trough of 14.9 mg/L and AUC24 of 516 mg*h/L based upon population pharmacokinetics and this patient's specific parameters.    Recommendations/Plan:  -Initiate vancomycin 1250 mg loading dose followed by 1000mg (18 mg/kg) IV every 12 hours   -Obtain vancomycin level on 11/13 prior to the 4th dose or sooner if acute changes   -Continue to monitor SCr    Thank you for involving pharmacy in this patient's care. Please contact pharmacy with any questions or concerns.                           Cherry Miles, Pharmacy Intern  Clinical Pharmacist  11/11/20 15:07 EST  "

## 2020-11-11 NOTE — PLAN OF CARE
Goal Outcome Evaluation:  Plan of Care Reviewed With: patient  Progress: no change   Vomited his breakfast up. Skipped lunch and ate dinner. At this time no vomiting after dinner. No falls or injury. Still bradycardic but asymptomatic. Swallows pills good with applesauce whole. New mattress placed for skin risk.

## 2020-11-11 NOTE — CONSULTS
[facility]                                                                                             Nephrology Initial Consult Note    Patient Identification:  Name: Trever Petit MRN: [id number]  Age: 64 y.o. : 1955  Sex: male  Date:2020    Requesting Physician: As per consult order.  Reason for Consultation: Hyponatremia   Information from:patient/ family/ chart      History of Present Illness: This is a 64 y.o. year old male with no prior nephrology consult who presented to ER post fall at home. Past medical history includes HTN, bradycardia, dehydration, CM, DM. On presentation to the ER was found to be hyponatremic and UTI was present.  We were consulted for his persistent hyponatremia 131 on admission and 131 today. Baseline Na looks to have been 135-138. Cr at baseline. Unable to obtain HPI from patient, all of the HPI was obtained via chart review. He was seen by speech therapy and his diet has been upgraded.     The following medical history and medications personally reviewed by me:    Problem List:   Patient Active Problem List    Diagnosis   • Severe malnutrition (CMS/[facility]) [E43]   • Malignant hypertensive urgency [I16.0]   • Bradycardia [R00.1]   • Fall [W19.XXXA]   • C1 cervical fracture (CMS/[facility]) [S12.000A]   • E. coli UTI (urinary tract infection) [N39.0, B96.20]   • Cystitis [N30.90]   • Seizure-like activity (CMS/[facility]) [R56.9]   • Pleural effusion [J90]   • Left lower lobe pneumonia [J18.9]   • Closed fracture of multiple ribs of left side [S22.42XA]   • Constipation [K59.00]   • Leukocytosis [D72.829]   • Fever [R50.9]   • Hypoglycemia [E16.2]   • Vascular dementia (CMS/[facility]) [F01.50]   • Type 2 diabetes mellitus, without long-term current use of insulin (CMS/[facility]) [E11.9]   • Stroke (CMS/[facility]) [I63.9]   • Essential hypertension [I10]   • Hypertensive emergency [I16.1]        Past Medical History:  Past Medical History:   Diagnosis Date   • JENI (acute kidney injury) (CMS/[facility]) 2019   • Anxiety    • Arthritis    • Bradycardia    • Carpal bone fracture    • Carpal tunnel syndrome    • Cerebrovascular accident (CMS/[facility])    • Constipation    • COPD (chronic obstructive pulmonary disease) (CMS/[facility])    • Coronary artery disease    • Diabetes mellitus (CMS/[facility])    • E. coli UTI (urinary tract infection) 2019   • Elevated cholesterol    • Hepatitis B    • Hepatitis-C    • Hypertension    • Myocardial infarction (CMS/[facility])    • Neuropathy    • Seizure-like activity (CMS/HCC) 2019   • Spinal stenosis    • Stroke (CMS/HCC)    • Type 2 diabetes mellitus, without long-term current use of insulin (CMS/HCC) 2019   • Vascular dementia (CMS/HCC) 2019       Past Surgical History:  Past Surgical History:   Procedure Laterality Date   • CARDIAC CATHETERIZATION N/A 2019    Procedure: Left Heart Cath;  Surgeon: Chen Aguilar MD;  Location:  [facility];  Service: Cardiovascular   • CARDIAC CATHETERIZATION N/A 2019    Procedure: Left ventriculography;  Surgeon: Chen Aguilar MD;  Location:  [facility];  Service: Cardiovascular   • CARDIAC CATHETERIZATION N/A 2019    Procedure: Coronary angiography;  Surgeon: Chen Aguilar MD;  Location:  [facility];  Service: Cardiovascular   • CARPAL TUNNEL RELEASE     • CERVICAL FUSION     • NECK SURGERY          Home Meds:   Medications Prior to Admission   Medication Sig Dispense Refill Last Dose   • acetaminophen (TYLENOL) 325 MG tablet Take 650 mg by mouth Every 6 (Six) Hours As Needed for Mild Pain .      • aspirin 81 MG EC tablet Take 81 mg by mouth Daily.      • atorvastatin (LIPITOR) 20 MG tablet Take 1 tablet by mouth Daily. 30 tablet 5    • calcium carbonate-cholecalciferol 500-400 MG-UNIT tablet tablet Take  by mouth Daily.      • [] cefTRIAXone Sodium  (ROCEPHIN IJ) Inject 1 g as directed Daily. Last dose on the 10th of November      • divalproex (DEPAKOTE) 250 MG DR tablet Take 250 mg by mouth 3 (Three) Times a Day.      • docusate sodium 100 MG capsule Take 100 mg by mouth 2 (Two) Times a Day.      • lisinopril (PRINIVIL,ZESTRIL) 40 MG tablet Take 1 tablet by mouth Daily. (Patient taking differently: Take 5 mg by mouth Every Night.) 30 tablet 5    • melatonin 5 MG tablet tablet Take 5 mg by mouth.      • OLANZapine (zyPREXA) 2.5 MG tablet Take 2.5 mg by mouth Every Night.      • OXcarbazepine (TRILEPTAL) 600 MG tablet Take 600 mg by mouth 2 (Two) Times a Day.      • PARoxetine (PAXIL) 10 MG tablet Take 1 tablet by mouth Every Morning.      • polyethylene glycol (MIRALAX) 17 g packet Take 17 g by mouth Daily.      • tamsulosin (FLOMAX) 0.4 MG capsule 24 hr capsule Take 1 capsule by mouth Every Night.      • trihexyphenidyl (ARTANE) 2 MG tablet Take 2 mg by mouth 2 (Two) Times a Day With Meals.      • amLODIPine (NORVASC) 5 MG tablet Take 1 tablet by mouth Daily.      • escitalopram (LEXAPRO) 10 MG tablet Take 10 mg by mouth Daily.          Current Meds:   Current Facility-Administered Medications   Medication Dose Route Frequency Provider Last Rate Last Dose   • acetaminophen (TYLENOL) tablet 650 mg  650 mg Oral Q6H PRN George Ware MD   650 mg at 11/09/20 1254   • amLODIPine (NORVASC) tablet 5 mg  5 mg Oral Q24H George Ware MD   5 mg at 11/11/20 0900   • aspirin EC tablet 81 mg  81 mg Oral Daily George Ware MD   81 mg at 11/11/20 0900   • atorvastatin (LIPITOR) tablet 20 mg  20 mg Oral Daily George Ware MD   20 mg at 11/11/20 0900   • dextrose (D50W) 25 g/ 50mL Intravenous Solution 25 g  25 g Intravenous Q15 Min George Chen MD       • dextrose (GLUTOSE) oral gel 15 g  15 g Oral Q15 Min George Chen MD       • divalproex (DEPAKOTE) DR tablet 250 mg  250 mg Oral TID George Ware MD   250 mg at 11/11/20 0900   •  glucagon (human recombinant) (GLUCAGEN DIAGNOSTIC) injection 1 mg  1 mg Subcutaneous Q15 Min George Chen MD       • insulin lispro (humaLOG) injection 0-7 Units  0-7 Units Subcutaneous TID AC George Ware MD       • lisinopril (PRINIVIL,ZESTRIL) tablet 5 mg  5 mg Oral Nightly George Ware MD   5 mg at 11/10/20 2018   • melatonin tablet 5 mg  5 mg Oral Nightly George Ware MD   5 mg at 11/09/20 2246   • OXcarbazepine (TRILEPTAL) tablet 600 mg  600 mg Oral Q12H George Ware MD   600 mg at 11/11/20 0901   • PARoxetine (PAXIL) tablet 10 mg  10 mg Oral QAM George Ware MD   10 mg at 11/11/20 0900   • Pharmacy to dose vancomycin   Does not apply Continuous George Chen MD       • sodium chloride 0.9 % flush 10 mL  10 mL Intravenous George Chen MD   10 mL at 11/10/20 2018   • sodium chloride 0.9 % flush 10 mL  10 mL Intravenous George Chen MD   10 mL at 11/09/20 0947   • sodium chloride 0.9 % infusion  75 mL/hr Intravenous Continuous George Ware MD 75 mL/hr at 11/10/20 1413 75 mL/hr at 11/10/20 1413   • tamsulosin (FLOMAX) 24 hr capsule 0.4 mg  0.4 mg Oral Nightly George Ware MD   0.4 mg at 11/10/20 2018   • [START ON 11/12/2020] vancomycin (VANCOCIN) in iso-osmotic dextrose IVPB 1 g (premix) 200 mL  1,000 mg Intravenous Q12H George Ware MD       • vancomycin 1250 mg/250 mL 0.9% NS IVPB ([facility])  1,250 mg Intravenous Once George Ware MD           Allergies:  Allergies   Allergen Reactions   • Clonidine Derivatives Unknown (See Comments)     .   • Hydrochlorothiazide Other (See Comments)     PT STATES CANT URINATE   • Metformin Unknown (See Comments)   • Sitagliptin Nausea And Vomiting       Social History:   Social History     Socioeconomic History   • Marital status: Single     Spouse name: Not on file   • Number of children: Not on file   • Years of education: Not on file   • Highest education level: Not on file   Tobacco Use   • Smoking  "status: Never Smoker   Substance and Sexual Activity   • Alcohol use: No     Frequency: Never   • Drug use: Yes     Types: Marijuana   • Sexual activity: Defer        Family History:  History reviewed. No pertinent family history.     Review of Systems: as per HPI, in addition:    Unable to perform: NONVERBAL    Physical Exam:  Vitals:   Temp (24hrs), Av.1 °F (36.7 °C), Min:97.3 °F (36.3 °C), Max:98.5 °F (36.9 °C)    /86 (BP Location: Left arm, Patient Position: Lying)   Pulse 60   Temp 98.5 °F (36.9 °C) (Oral)   Resp 18   Ht 172.7 cm (67.99\")   Wt 55.8 kg (123 lb)   SpO2 98%   BMI 18.71 kg/m²   Intake/Output:     Intake/Output Summary (Last 24 hours) at 2020 1517  Last data filed at 2020 0928  Gross per 24 hour   Intake 870 ml   Output --   Net 870 ml        Wt Readings from Last 1 Encounters:   20 0057 55.8 kg (123 lb)   20 1850 65.8 kg (145 lb)       Exam:    General Appearance:  Awake, alert, oriented x3, no acute distress  malnourished chronically ill-appearing   Head and Face:  Normocephalic, atraumatic, mucus membranes moist, oropharynx clear   Eyes:  No icterus, pupils equal round and reactive to light, extraocular movements intact    ENMT: Moist mucosa, tongue symmetric    Neck: Supple  no jugular venous distention  no thyromegaly   Pulmonary:  Respiratory effort: Normal  Auscultation of lungs: Clear bilaterally  No wheezes  No rhonchi  Good air movement, good expansion   Chest wall:  No tenderness or deformity   Cardiovascular:  Auscultation of the heart: Bradycardia, no murmurs  no edema of bilateral lower extremities   Abdomen:  Abdomen: soft, non-tender, normal bowel sounds all four quadrants, no masses   Liver and spleen: no hepatosplenomegaly   Musculoskeletal: Digits and nails: normal  Normal range of motion  No joint swelling or gross deformities    Skin: Skin inspection: color normal, no visible rashes or lesions  Skin palpation: texture, turgor normal, no " palpable lesions   Lymphatic:  no cervical lymphadenopathy    Psychiatric: LUZ MARIA nonverbal on exam       DATA:  Radiology and Labs:  The following labs independently reviewed by me, additional AM labs ordered  Old records independently reviewed showing 135-137 Na at baseline   The following radiologic studies independently viewed by me  Interval notes, chart personally reviewed by me.    I have reviewed and summation of old records and/or discussed the patients care with another health care provider, Dr. Allen   New problems include Hyponatremia     Platelet count 227    Risk/ complexity of medical care/ medical decision making:  High:  Chronic illness with severe exacerbation or progression      Labs:   Recent Results (from the past 24 hour(s))   POC Glucose Once    Collection Time: 11/10/20  4:06 PM    Specimen: Blood   Result Value Ref Range    Glucose 125 70 - 130 mg/dL   POC Glucose Once    Collection Time: 11/10/20  8:35 PM    Specimen: Blood   Result Value Ref Range    Glucose 107 70 - 130 mg/dL   CBC (No Diff)    Collection Time: 11/11/20  3:42 AM    Specimen: Blood   Result Value Ref Range    WBC 8.48 3.40 - 10.80 10*3/mm3    RBC 3.20 (L) 4.14 - 5.80 10*6/mm3    Hemoglobin 10.6 (L) 13.0 - 17.7 g/dL    Hematocrit 30.4 (L) 37.5 - 51.0 %    MCV 95.0 79.0 - 97.0 fL    MCH 33.1 (H) 26.6 - 33.0 pg    MCHC 34.9 31.5 - 35.7 g/dL    RDW 11.7 (L) 12.3 - 15.4 %    RDW-SD 40.1 37.0 - 54.0 fl    MPV 9.1 6.0 - 12.0 fL    Platelets 227 140 - 450 10*3/mm3   Basic Metabolic Panel    Collection Time: 11/11/20  3:42 AM    Specimen: Blood   Result Value Ref Range    Glucose 97 65 - 99 mg/dL    BUN 17 8 - 23 mg/dL    Creatinine 0.60 (L) 0.76 - 1.27 mg/dL    Sodium 131 (L) 136 - 145 mmol/L    Potassium 4.4 3.5 - 5.2 mmol/L    Chloride 97 (L) 98 - 107 mmol/L    CO2 25.6 22.0 - 29.0 mmol/L    Calcium 8.0 (L) 8.6 - 10.5 mg/dL    eGFR Non African Amer 136 >60 mL/min/1.73    BUN/Creatinine Ratio 28.3 (H) 7.0 - 25.0    Anion Gap 8.4  5.0 - 15.0 mmol/L   POC Glucose Once    Collection Time: 11/11/20  6:14 AM    Specimen: Blood   Result Value Ref Range    Glucose 102 70 - 130 mg/dL   POC Glucose Once    Collection Time: 11/11/20 11:09 AM    Specimen: Blood   Result Value Ref Range    Glucose 120 70 - 130 mg/dL         ASSESSMENT:   Hyponatremia  UTI  HTN  DM  Swallowing impairment  Severe malnutrition     PLAN:   Hyponatremia could be secondary to medications, will obtain urine studies and rule out any other etiologies, D/C Paxil  His baseline Na is 135-137, it is not likely the cause of his confusion and is more likely to be attributed to his UTI.  Speech consult noted  D/C IVF  Encourage PO as tolerated    Continue to monitor electrolytes and volume closely, avoid IV contrast and nephrotoxic medications     I appreciate the consult request, please call if any questions      JOCELINE Bentley  [facility]  Office phone number: [phone]  Answering service phone number: [phone]        11/11/2020

## 2020-11-12 LAB
ALBUMIN SERPL-MCNC: 3.2 G/DL (ref 3.5–5.2)
ANION GAP SERPL CALCULATED.3IONS-SCNC: 4.3 MMOL/L (ref 5–15)
BACTERIA SPEC AEROBE CULT: ABNORMAL
BUN SERPL-MCNC: 13 MG/DL (ref 8–23)
BUN/CREAT SERPL: 27.7 (ref 7–25)
CALCIUM SPEC-SCNC: 8.3 MG/DL (ref 8.6–10.5)
CHLORIDE SERPL-SCNC: 98 MMOL/L (ref 98–107)
CO2 SERPL-SCNC: 26.7 MMOL/L (ref 22–29)
CORTIS SERPL-MCNC: 16.39 MCG/DL
CREAT SERPL-MCNC: 0.47 MG/DL (ref 0.76–1.27)
DEPRECATED RDW RBC AUTO: 41.6 FL (ref 37–54)
ERYTHROCYTE [DISTWIDTH] IN BLOOD BY AUTOMATED COUNT: 11.7 % (ref 12.3–15.4)
GFR SERPL CREATININE-BSD FRML MDRD: >150 ML/MIN/1.73
GLUCOSE BLDC GLUCOMTR-MCNC: 116 MG/DL (ref 70–130)
GLUCOSE BLDC GLUCOMTR-MCNC: 141 MG/DL (ref 70–130)
GLUCOSE BLDC GLUCOMTR-MCNC: 83 MG/DL (ref 70–130)
GLUCOSE BLDC GLUCOMTR-MCNC: 91 MG/DL (ref 70–130)
GLUCOSE SERPL-MCNC: 79 MG/DL (ref 65–99)
HCT VFR BLD AUTO: 30.5 % (ref 37.5–51)
HGB BLD-MCNC: 10.4 G/DL (ref 13–17.7)
MCH RBC QN AUTO: 32.8 PG (ref 26.6–33)
MCHC RBC AUTO-ENTMCNC: 34.1 G/DL (ref 31.5–35.7)
MCV RBC AUTO: 96.2 FL (ref 79–97)
PHOSPHATE SERPL-MCNC: 3.1 MG/DL (ref 2.5–4.5)
PLATELET # BLD AUTO: 221 10*3/MM3 (ref 140–450)
PMV BLD AUTO: 8.9 FL (ref 6–12)
POTASSIUM SERPL-SCNC: 3.9 MMOL/L (ref 3.5–5.2)
RBC # BLD AUTO: 3.17 10*6/MM3 (ref 4.14–5.8)
SODIUM SERPL-SCNC: 129 MMOL/L (ref 136–145)
WBC # BLD AUTO: 6.65 10*3/MM3 (ref 3.4–10.8)

## 2020-11-12 PROCEDURE — 80069 RENAL FUNCTION PANEL: CPT | Performed by: NURSE PRACTITIONER

## 2020-11-12 PROCEDURE — 85027 COMPLETE CBC AUTOMATED: CPT | Performed by: INTERNAL MEDICINE

## 2020-11-12 PROCEDURE — 84588 ASSAY OF VASOPRESSIN: CPT | Performed by: NURSE PRACTITIONER

## 2020-11-12 PROCEDURE — 99252 IP/OBS CONSLTJ NEW/EST SF 35: CPT | Performed by: INTERNAL MEDICINE

## 2020-11-12 PROCEDURE — 82533 TOTAL CORTISOL: CPT | Performed by: INTERNAL MEDICINE

## 2020-11-12 PROCEDURE — 82962 GLUCOSE BLOOD TEST: CPT

## 2020-11-12 PROCEDURE — 25010000002 VANCOMYCIN PER 500 MG: Performed by: INTERNAL MEDICINE

## 2020-11-12 RX ORDER — AMLODIPINE BESYLATE 10 MG/1
10 TABLET ORAL
Status: DISCONTINUED | OUTPATIENT
Start: 2020-11-12 | End: 2020-11-17

## 2020-11-12 RX ADMIN — SODIUM CHLORIDE, PRESERVATIVE FREE 10 ML: 5 INJECTION INTRAVENOUS at 08:54

## 2020-11-12 RX ADMIN — TAMSULOSIN HYDROCHLORIDE 0.4 MG: 0.4 CAPSULE ORAL at 20:59

## 2020-11-12 RX ADMIN — OXCARBAZEPINE 600 MG: 300 TABLET, FILM COATED ORAL at 20:59

## 2020-11-12 RX ADMIN — DIVALPROEX SODIUM 250 MG: 250 TABLET, DELAYED RELEASE ORAL at 18:13

## 2020-11-12 RX ADMIN — OXCARBAZEPINE 600 MG: 300 TABLET, FILM COATED ORAL at 08:53

## 2020-11-12 RX ADMIN — ATORVASTATIN CALCIUM 20 MG: 20 TABLET, FILM COATED ORAL at 08:53

## 2020-11-12 RX ADMIN — AMLODIPINE BESYLATE 10 MG: 10 TABLET ORAL at 08:53

## 2020-11-12 RX ADMIN — VANCOMYCIN HYDROCHLORIDE 1000 MG: 1 INJECTION, SOLUTION INTRAVENOUS at 06:19

## 2020-11-12 RX ADMIN — VANCOMYCIN HYDROCHLORIDE 1000 MG: 1 INJECTION, SOLUTION INTRAVENOUS at 18:13

## 2020-11-12 RX ADMIN — Medication 5 MG: at 20:59

## 2020-11-12 RX ADMIN — DIVALPROEX SODIUM 250 MG: 250 TABLET, DELAYED RELEASE ORAL at 20:59

## 2020-11-12 RX ADMIN — DIVALPROEX SODIUM 250 MG: 250 TABLET, DELAYED RELEASE ORAL at 08:53

## 2020-11-12 RX ADMIN — ASPIRIN 81 MG: 81 TABLET, FILM COATED ORAL at 08:53

## 2020-11-12 RX ADMIN — SODIUM CHLORIDE, PRESERVATIVE FREE 10 ML: 5 INJECTION INTRAVENOUS at 20:57

## 2020-11-12 NOTE — PROGRESS NOTES
LOS: 4 days     Chief Complaint/ Reason for encounter: Hyponatremia   Chief Complaint   Patient presents with   • Lip Laceration   • Fall         Subjective   Nonverbal  Sleeping     Medical history reviewed:  History of Present Illness    Subjective    History taken from: Patient and chart    Vital Signs  Temp:  [97.7 °F (36.5 °C)-98.5 °F (36.9 °C)] 98.1 °F (36.7 °C)  Heart Rate:  [33-60] 38  Resp:  [16-18] 16  BP: (150-164)/(78-93) 163/84       Wt Readings from Last 1 Encounters:   11/09/20 0057 55.8 kg (123 lb)   11/08/20 1850 65.8 kg (145 lb)       Objective    Objective:  General Appearance:  Comfortable, malnourished and chronically ill-appearing, in no acute distress and not in pain.  Awake, alert, oriented  HEENT: Mucous membranes moist, no injury, oropharynx clear  Lungs:  Normal effort and normal respiratory rate.  Breath sounds clear to auscultation.  No  respiratory distress.  No rales, decreased breath sounds or rhonchi.    Heart: Bradycardia 30-35.  Regular rhythm.  S1 normal.  No murmur.   Abdomen: Abdomen is soft.  Bowel sounds are normal, no abdominal tenderness.  There is no rebound or guarding  Extremities: Normal range of motion.  No edema of bilateral lower extremities, distal pulses intact  Neurological: No focal motor or sensory deficits, pupils reactive  Skin:  Warm and dry.  No rash or cyanosis.       Results Review:    Intake/Output:     Intake/Output Summary (Last 24 hours) at 11/12/2020 1127  Last data filed at 11/12/2020 0935  Gross per 24 hour   Intake 330 ml   Output --   Net 330 ml         DATA:  Radiology and Labs:  The following labs independently reviewed by me. Additional labs ordered for tomorrow a.m.  Interval notes, chart personally reviewed by me.   Old records independently reviewed showing Na 135-137  The following radiologic studies independently viewed by me  New problems include bradycardia, persistent hyponatremia   Discussed with Dr. Allen     Risk/ complexity of  medical care/ medical decision making high    Labs:   Recent Results (from the past 24 hour(s))   Osmolality, Serum    Collection Time: 11/11/20  3:07 PM    Specimen: Arm, Left; Blood   Result Value Ref Range    Osmolality 279 (L) 280 - 301 mOsm/kg   POC Glucose Once    Collection Time: 11/11/20  4:18 PM    Specimen: Blood   Result Value Ref Range    Glucose 92 70 - 130 mg/dL   Osmolality, Urine - Urine, Clean Catch    Collection Time: 11/11/20  6:16 PM    Specimen: Urine, Clean Catch   Result Value Ref Range    Osmolality, Urine 520 mOsm/kg   Creatinine, Urine, Random - Urine, Random Void    Collection Time: 11/11/20  7:00 PM    Specimen: Urine, Random Void   Result Value Ref Range    Creatinine, Urine 41.9 mg/dL   Chloride, Urine, Random - Urine, Random Void    Collection Time: 11/11/20  7:00 PM    Specimen: Urine, Random Void   Result Value Ref Range    Chloride, Urine 135 mmol/L   Sodium, Urine, Random - Urine, Random Void    Collection Time: 11/11/20  7:00 PM    Specimen: Urine, Random Void   Result Value Ref Range    Sodium, Urine 147 mmol/L   POC Glucose Once    Collection Time: 11/11/20  7:59 PM    Specimen: Blood   Result Value Ref Range    Glucose 126 70 - 130 mg/dL   CBC (No Diff)    Collection Time: 11/12/20  5:47 AM    Specimen: Blood   Result Value Ref Range    WBC 6.65 3.40 - 10.80 10*3/mm3    RBC 3.17 (L) 4.14 - 5.80 10*6/mm3    Hemoglobin 10.4 (L) 13.0 - 17.7 g/dL    Hematocrit 30.5 (L) 37.5 - 51.0 %    MCV 96.2 79.0 - 97.0 fL    MCH 32.8 26.6 - 33.0 pg    MCHC 34.1 31.5 - 35.7 g/dL    RDW 11.7 (L) 12.3 - 15.4 %    RDW-SD 41.6 37.0 - 54.0 fl    MPV 8.9 6.0 - 12.0 fL    Platelets 221 140 - 450 10*3/mm3   Renal Function Panel    Collection Time: 11/12/20  5:47 AM    Specimen: Blood   Result Value Ref Range    Glucose 79 65 - 99 mg/dL    BUN 13 8 - 23 mg/dL    Creatinine 0.47 (L) 0.76 - 1.27 mg/dL    Sodium 129 (L) 136 - 145 mmol/L    Potassium 3.9 3.5 - 5.2 mmol/L    Chloride 98 98 - 107 mmol/L    CO2  26.7 22.0 - 29.0 mmol/L    Calcium 8.3 (L) 8.6 - 10.5 mg/dL    Albumin 3.20 (L) 3.50 - 5.20 g/dL    Phosphorus 3.1 2.5 - 4.5 mg/dL    Anion Gap 4.3 (L) 5.0 - 15.0 mmol/L    BUN/Creatinine Ratio 27.7 (H) 7.0 - 25.0    eGFR Non African Amer >150 >60 mL/min/1.73   Cortisol    Collection Time: 11/12/20  5:47 AM    Specimen: Blood   Result Value Ref Range    Cortisol 16.39   mcg/dL   POC Glucose Once    Collection Time: 11/12/20  5:52 AM    Specimen: Blood   Result Value Ref Range    Glucose 83 70 - 130 mg/dL   POC Glucose Once    Collection Time: 11/12/20 11:15 AM    Specimen: Blood   Result Value Ref Range    Glucose 141 (H) 70 - 130 mg/dL       Radiology:  Imaging Results (Last 24 Hours)     ** No results found for the last 24 hours. **             Medications have been reviewed:  Current Facility-Administered Medications   Medication Dose Route Frequency Provider Last Rate Last Dose   • acetaminophen (TYLENOL) tablet 650 mg  650 mg Oral Q6H George Chen MD   650 mg at 11/11/20 2138   • amLODIPine (NORVASC) tablet 10 mg  10 mg Oral Q24H Jeff Allen MD   10 mg at 11/12/20 0853   • aspirin EC tablet 81 mg  81 mg Oral Daily George Ware MD   81 mg at 11/12/20 0853   • atorvastatin (LIPITOR) tablet 20 mg  20 mg Oral Daily George Ware MD   20 mg at 11/12/20 0853   • dextrose (D50W) 25 g/ 50mL Intravenous Solution 25 g  25 g Intravenous Q15 Min George Chen MD       • dextrose (GLUTOSE) oral gel 15 g  15 g Oral Q15 Min George Chen MD       • divalproex (DEPAKOTE) DR tablet 250 mg  250 mg Oral TID George Ware MD   250 mg at 11/12/20 0853   • glucagon (human recombinant) (GLUCAGEN DIAGNOSTIC) injection 1 mg  1 mg Subcutaneous Q15 Min George Chen MD       • insulin lispro (humaLOG) injection 0-7 Units  0-7 Units Subcutaneous TID AC George Ware MD       • melatonin tablet 5 mg  5 mg Oral Nightly George Ware MD   5 mg at 11/11/20 2131   • OXcarbazepine  (TRILEPTAL) tablet 600 mg  600 mg Oral Q12H George Ware MD   600 mg at 11/12/20 0853   • Pharmacy to dose vancomycin   Does not apply Continuous George Chen MD       • sodium chloride 0.9 % flush 10 mL  10 mL Intravenous George Chen MD   10 mL at 11/10/20 2018   • sodium chloride 0.9 % flush 10 mL  10 mL Intravenous George Chen MD   10 mL at 11/12/20 0854   • tamsulosin (FLOMAX) 24 hr capsule 0.4 mg  0.4 mg Oral Nightly George Ware MD   0.4 mg at 11/11/20 2131   • vancomycin (VANCOCIN) in iso-osmotic dextrose IVPB 1 g (premix) 200 mL  1,000 mg Intravenous Q12H George Ware MD   1,000 mg at 11/12/20 0619       ASSESSMENT:   Hyponatremia  UTI  HTN  DM  Swallowing impairment  Severe malnutrition   Bradycardia      PLAN:   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  Continue current  Cardiology following plans for pacemaker tomorrow      JOCELINE Bentley  [facility]   Office phone number: [phone]  Answering service phone number: [phone]    11/12/20  11:27 EST

## 2020-11-12 NOTE — CONSULTS
Patient Name: Trever Petit  Age/Sex: 64 y.o. male  : 1955  MRN: [id number]    Date of Admission: 2020  Date of Encounter Visit: 20  Encounter Provider: Abhishek Zaldivar MD  Place of Service: [facility] CARDIOLOGY      Referring Provider: George Ware MD  Patient Care Team:  George Ware MD as PCP - General (Internal Medicine)    Subjective:   Consulted for: bradycardia    Chief Complaint: s/p fall    History of Present Illness:  Trever Petit is a 64 y.o. male  nursing home resident, normally followed by Dr. Aguilar with a history of dementia, RBBB, prior CVA, HTN, DM, and seizures who was hospitalized in May of this year for cervical fracture, C1- after falling out of his bed which was treated with a neck brace. Dr. Chowdary saw him in hospital consultation for bradycardia where at this time he did not have high grade AV block, so pacemaker was not recommended at this time.. Ziopatch was recommended where the study found that he was predominately in sinus, but did have bradycardia overnight, and had one episode of SVT.    He came into the hospital 3 days ago for falling out of bed again. ER imaging found that he had chronic fracture of his neck and no acute processes were found on CT of head. Work up also found that he was hypertensive with systolics in 190s and 200s and they noticed that he was bradycardic, dehydrated, hyponatremic, and had a UTI. He was started on antibiotics as his urine culture found gram positive cocci and Nephrology was consulted to help manage his hyponatremia where there was concern that paxil was contributing to this- which has been stopped. Nursing reported that while awake his heart rates are in the 60s-80s,    Again, we have been consulted for bradycardia for rates mid 30s-50s while sleeping.  He is significantly bradycardic while at rest this morning.  Heart rate is now down into the 20s and 30s.      Previous  Testing-  Holter Monitor on 5/27/20-  Study Findings    The predominant rhythm noted during the testing period was sinus rhythm. Premature atrial contractions occured rarely. There was one episodes of supraventricular tachycardia. Premature ventricular contractions occured rarely. Sinoatrial node conduction was normal. No atrioventricular block noted.   Study Impressions    A relatively benign monitor study.   Sinus bradycardia over night     Cardiac catheterization on 6/25/19-  FINDINGS:     CORONARY ANGIOGRAPHY:   1.  Left main: 0% stenosis.  The vessel trifurcates into left anterior descending, ramus intermedius, and left circumflex arteries.  2.  Left anterior descending artery: 0% proximal stenosis.  There is 10% mid stenosis and 0% distal stenosis.  Mid vessel gives rise to a small first diagonal branch with no significant disease.  3.  Ramus intermedius: Moderate caliber vessel with 30% proximal stenosis.  4.  Left circumflex artery: 10% proximal and mid stenosis.  Mid vessel gives rise to a small first and a large second obtuse marginal branches both with no significant disease.  The distal vessel then gives rise to a moderate-sized third obtuse marginal branch with 0% stenosis and then tapers to a small vessel.  5.  Right coronary artery: There is 0% proximal stenosis.  Moderate diffuse calcification with 30 to 40% mid stenosis.  The distal vessel bifurcates into a large posterior descending and posterior lateral branches both with 0% stenosis.  This is a right dominant system.     LEFT VENTRICULAR HEMODYNAMICS:    1.  Left ventricular pressure: 114/6  2.  Aortic pressure: 117/55     LEFT VENTRICULOGRAPHY:   Hyperdynamic left ventricular systolic function with an EF of greater than 60%.        POST-PROCEDURE DIAGNOSIS:   1. Mild non-obstructive coronary artery disease  2. Hypertension     RECOMMENDATIONS:   Will admit overnight and adjust antihypertensive medications as needed.     Echocardiogram on  6/22/19-  Interpretation Summary    · Left ventricular systolic function is normal. Calculated EF = 68.0%. Estimated EF was in agreement with the calculated EF. Estimated EF = 68%. Normal left ventricular cavity size noted. All left ventricular wall segments contract normally. Left ventricular wall thickness is consistent with moderate septal asymmetric hypertrophy. There is no LVOT obstruction.. Left ventricular diastolic dysfunction is noted (grade I) consistent with impaired relaxation.  · The aortic valve is abnormal in structure. There is mild thickening of the aortic valve. No aortic valve regurgitation is present.  · Mild MAC is present. Trace mitral valve regurgitation is present.  · Trace tricuspid valve regurgitation is present. Estimated right ventricular systolic pressure from tricuspid regurgitation is normal (<35 mmHg).  · Mild dilation of the ascending aorta is present. The ascending aorta measures 3.8cm.         Past Medical History:  Past Medical History:   Diagnosis Date   • JENI (acute kidney injury) (CMS/[facility]) 9/25/2019   • Anxiety    • Arthritis    • Bradycardia    • Carpal bone fracture    • Carpal tunnel syndrome    • Cerebrovascular accident (CMS/HCC)    • Constipation    • COPD (chronic obstructive pulmonary disease) (CMS/[facility])    • Coronary artery disease    • Diabetes mellitus (CMS/[facility])    • E. coli UTI (urinary tract infection) 9/26/2019   • Elevated cholesterol    • Hepatitis B    • Hepatitis-C    • Hypertension    • Myocardial infarction (CMS/HCC)    • Neuropathy    • Seizure-like activity (CMS/HCC) 9/25/2019   • Spinal stenosis    • Stroke (CMS/HCC)    • Type 2 diabetes mellitus, without long-term current use of insulin (CMS/HCC) 8/25/2019   • Vascular dementia (CMS/[facility]) 8/25/2019       Past Surgical History:   Procedure Laterality Date   • CARDIAC CATHETERIZATION N/A 6/25/2019    Procedure: Left Heart Cath;  Surgeon: Chen Aguilar MD;  Location: [facility] INVASIVE LOC[facility];   Service: Cardiovascular   • CARDIAC CATHETERIZATION N/A 2019    Procedure: Left ventriculography;  Surgeon: Chen Aguilar MD;  Location:  [facility] CATH INVASIVE LOCATION;  Service: Cardiovascular   • CARDIAC CATHETERIZATION N/A 2019    Procedure: Coronary angiography;  Surgeon: Chen Aguilar MD;  Location:  [facility] CATH INVASIVE LOCATION;  Service: Cardiovascular   • CARPAL TUNNEL RELEASE     • CERVICAL FUSION     • NECK SURGERY         Home Medications:   Medications Prior to Admission   Medication Sig Dispense Refill Last Dose   • acetaminophen (TYLENOL) 325 MG tablet Take 650 mg by mouth Every 6 (Six) Hours As Needed for Mild Pain .      • aspirin 81 MG EC tablet Take 81 mg by mouth Daily.      • atorvastatin (LIPITOR) 20 MG tablet Take 1 tablet by mouth Daily. 30 tablet 5    • calcium carbonate-cholecalciferol 500-400 MG-UNIT tablet tablet Take  by mouth Daily.      • [] cefTRIAXone Sodium (ROCEPHIN IJ) Inject 1 g as directed Daily. Last dose on the       • divalproex (DEPAKOTE) 250 MG DR tablet Take 250 mg by mouth 3 (Three) Times a Day.      • docusate sodium 100 MG capsule Take 100 mg by mouth 2 (Two) Times a Day.      • lisinopril (PRINIVIL,ZESTRIL) 40 MG tablet Take 1 tablet by mouth Daily. (Patient taking differently: Take 5 mg by mouth Every Night.) 30 tablet 5    • melatonin 5 MG tablet tablet Take 5 mg by mouth.      • OLANZapine (zyPREXA) 2.5 MG tablet Take 2.5 mg by mouth Every Night.      • OXcarbazepine (TRILEPTAL) 600 MG tablet Take 600 mg by mouth 2 (Two) Times a Day.      • PARoxetine (PAXIL) 10 MG tablet Take 1 tablet by mouth Every Morning.      • polyethylene glycol (MIRALAX) 17 g packet Take 17 g by mouth Daily.      • tamsulosin (FLOMAX) 0.4 MG capsule 24 hr capsule Take 1 capsule by mouth Every Night.      • trihexyphenidyl (ARTANE) 2 MG tablet Take 2 mg by mouth 2 (Two) Times a Day With Meals.      • amLODIPine (NORVASC) 5 MG tablet Take 1 tablet by mouth  Daily.      • escitalopram (LEXAPRO) 10 MG tablet Take 10 mg by mouth Daily.          Allergies:  Allergies   Allergen Reactions   • Clonidine Derivatives Unknown (See Comments)     .   • Hydrochlorothiazide Other (See Comments)     PT STATES CANT URINATE   • Metformin Unknown (See Comments)   • Sitagliptin Nausea And Vomiting       Past Social History:  Social History     Socioeconomic History   • Marital status: Single     Spouse name: Not on file   • Number of children: Not on file   • Years of education: Not on file   • Highest education level: Not on file   Tobacco Use   • Smoking status: Never Smoker   Substance and Sexual Activity   • Alcohol use: No     Frequency: Never   • Drug use: Yes     Types: Marijuana   • Sexual activity: Defer        Past Family History:  History reviewed. No pertinent family history.    Review of Systems: All systems reviewed. Pertinent positives identified in HPI. All other systems are negative.     REVIEW OF SYSTEMS:   CONSTITUTIONAL: No weight loss, fever, chills, weakness or fatigue.   HEENT: Eyes: No visual loss, blurred vision, double vision or yellow sclerae. Ears, Nose, Throat: No hearing loss, sneezing, congestion, runny nose or sore throat.   SKIN: No rash or itching.     RESPIRATORY: No shortness of breath, hemoptysis, cough or sputum.   GASTROINTESTINAL: No anorexia, nausea, vomiting or diarrhea. No abdominal pain, bright red blood per rectum or melena.  GENITOURINARY: No burning on urination, hematuria or increased frequency.  NEUROLOGICAL: No headache, dizziness, syncope, paralysis, ataxia, numbness or tingling in the extremities. No change in bowel or bladder control.   MUSCULOSKELETAL: No muscle, back pain, joint pain or stiffness.   HEMATOLOGIC: No anemia, bleeding or bruising.   LYMPHATICS: No enlarged nodes. No history of splenectomy.   PSYCHIATRIC: No history of depression, anxiety, hallucinations.   ENDOCRINOLOGIC: No reports of sweating, cold or heat  intolerance. No polyuria or polydipsia.       Objective:     Objective:  Temp:  [97.7 °F (36.5 °C)-98.5 °F (36.9 °C)] 98.1 °F (36.7 °C)  Heart Rate:  [33-73] 38  Resp:  [16-18] 16  BP: (150-164)/(78-93) 163/84    Intake/Output Summary (Last 24 hours) at 11/12/2020 0841  Last data filed at 11/11/2020 1819  Gross per 24 hour   Intake 330 ml   Output --   Net 330 ml     Body mass index is 18.71 kg/m².      11/08/20  1850 11/09/20  0057   Weight: 65.8 kg (145 lb) 55.8 kg (123 lb)           Physical Exam:   Vitals signs reviewed.   Constitutional:       Appearance: Well-developed.   Eyes:      Pupils: Pupils are equal, round, and reactive to light.   HENT:      Head: Normocephalic.   Neck:      Musculoskeletal: Normal range of motion.      Thyroid: No thyromegaly.      Vascular: No carotid bruit or JVD.   Pulmonary:      Effort: Pulmonary effort is normal.      Breath sounds: Normal breath sounds.   Cardiovascular:      Bradycardia present. Regular rhythm.      No gallop.   Pulses:     Intact distal pulses.   Abdominal:      General: Bowel sounds are normal.      Palpations: Abdomen is soft.   Skin:     General: Skin is warm and dry.      Findings: No erythema.   Neurological:      Mental Status: Alert.           Lab Review:     Results from last 7 days   Lab Units 11/12/20  0547 11/11/20  0342 11/10/20  0512   SODIUM mmol/L 129* 131* 129*   POTASSIUM mmol/L 3.9 4.4 4.3   CHLORIDE mmol/L 98 97* 97*   CO2 mmol/L 26.7 25.6 25.2   BUN mg/dL 13 17 19   CREATININE mg/dL 0.47* 0.60* 0.65*   GLUCOSE mg/dL 79 97 77   CALCIUM mg/dL 8.3* 8.0* 8.2*           Results from last 7 days   Lab Units 11/12/20  0547   WBC 10*3/mm3 6.65   HEMOGLOBIN g/dL 10.4*   HEMATOCRIT % 30.5*   PLATELETS 10*3/mm3 221                             Results from last 7 days   Lab Units 11/11/20  0342   TSH uIU/mL 1.540       Imaging:      Imaging Results (Most Recent)     Procedure Component Value Units Date/Time    CT Head Without Contrast [[id number]]  Collected: 11/08/20 2217     Updated: 11/08/20 2231    Narrative:      CT HEAD WITHOUT CONTRAST:      HISTORY:  Fall out of bed from nursing home.     TECHNIQUE:  Axial images were obtained through the brain without  contrast administration. Multiplanar reformatted images were  reconstructed from the helical source data. Radiation dose reduction  techniques were utilized, including automated exposure control and  exposure modulation based on body size.        COMPARISON: 05/23/2020.     FINDINGS:   Extensive encephalomalacia throughout the right hemisphere from prior  right MCA infarction.     Stable ex vacuo dilatation of the right lateral ventricle.     Stable moderate cerebral volume loss with proportionate prominence of  the remaining ventricular system and sulci. No hydrocephalus or midline  shift.      Stable moderate scattered hypodensity throughout the periventricular and  subcortical white matter that is most likely secondary to chronic  microvascular ischemia. There is no evidence of a large territorial  infarction. No hemorrhage or extra-axial fluid collection. Intracranial  atherosclerotic arterial calcifications.      The orbits are unremarkable. The visualized paranasal sinuses and left  mastoid air cells are clear. Moderate opacification of the right mastoid  air cells.       The calvarium is intact. The scalp soft tissues are unremarkable.       Impression:      1.   No acute intracranial abnormality.   2.  Sequela of prior right MCA infarction.  3.  Moderate right mastoid effusion.  4.  Senescent changes.           CERVICAL SPINE CT:     HISTORY:  Follow-up from nursing home.     TECHNIQUE:  Helical axial images were performed from the skull base  through the upper thoracic spine without contrast. Sagittal and coronal  reformatted images were performed. Radiation dose reduction techniques  were utilized, including automated exposure control and exposure  modulation based on body size.         COMPARISON:  05/23/2020.     FINDINGS:    Chronic fracture of the left C1 ring with healing along the anterior  aspect and nonunion at the posterior aspect. No evidence of acute  fracture. Stable fusion hardware at C4-C7 without hardware complication.  Straightening of the normal cervical lordosis. Stable multilevel  spondylosis. No high-grade spinal canal narrowing. The paravertebral  soft tissues are normal. The lung apices are clear.     IMPRESSION:   1.  No evidence of acute fracture.  2.  Chronic left C1 fracture with partial healing and evidence of  nonunion.  3.  Stable fusion hardware without competition.     This report was finalized on 11/8/2020 10:28 PM by Dr. Shaheen Olea M.D.       CT Cervical Spine Without Contrast [[id number]] Collected: 11/08/20 2217     Updated: 11/08/20 2231    Narrative:      CT HEAD WITHOUT CONTRAST:      HISTORY:  Fall out of bed from nursing home.     TECHNIQUE:  Axial images were obtained through the brain without  contrast administration. Multiplanar reformatted images were  reconstructed from the helical source data. Radiation dose reduction  techniques were utilized, including automated exposure control and  exposure modulation based on body size.        COMPARISON: 05/23/2020.     FINDINGS:   Extensive encephalomalacia throughout the right hemisphere from prior  right MCA infarction.     Stable ex vacuo dilatation of the right lateral ventricle.     Stable moderate cerebral volume loss with proportionate prominence of  the remaining ventricular system and sulci. No hydrocephalus or midline  shift.      Stable moderate scattered hypodensity throughout the periventricular and  subcortical white matter that is most likely secondary to chronic  microvascular ischemia. There is no evidence of a large territorial  infarction. No hemorrhage or extra-axial fluid collection. Intracranial  atherosclerotic arterial calcifications.      The orbits are unremarkable. The visualized  paranasal sinuses and left  mastoid air cells are clear. Moderate opacification of the right mastoid  air cells.       The calvarium is intact. The scalp soft tissues are unremarkable.       Impression:      1.   No acute intracranial abnormality.   2.  Sequela of prior right MCA infarction.  3.  Moderate right mastoid effusion.  4.  Senescent changes.           CERVICAL SPINE CT:     HISTORY:  Follow-up from nursing home.     TECHNIQUE:  Helical axial images were performed from the skull base  through the upper thoracic spine without contrast. Sagittal and coronal  reformatted images were performed. Radiation dose reduction techniques  were utilized, including automated exposure control and exposure  modulation based on body size.        COMPARISON:  2020.     FINDINGS:    Chronic fracture of the left C1 ring with healing along the anterior  aspect and nonunion at the posterior aspect. No evidence of acute  fracture. Stable fusion hardware at C4-C7 without hardware complication.  Straightening of the normal cervical lordosis. Stable multilevel  spondylosis. No high-grade spinal canal narrowing. The paravertebral  soft tissues are normal. The lung apices are clear.     IMPRESSION:   1.  No evidence of acute fracture.  2.  Chronic left C1 fracture with partial healing and evidence of  nonunion.  3.  Stable fusion hardware without competition.     This report was finalized on 2020 10:28 PM by Dr. Shaheen Olea M.D.               EK20-          Baseline: 20-      I personally viewed and interpreted the patient's EKG/Telemetry data.    Assessment:   Assessment/Plan         Malignant hypertensive urgency    Severe malnutrition (CMS/HCC)    Dysphagia    1.  Bradycardia: Patient is significantly bradycardic with heart rates in the 20s and 30s at rest.  He is awake.  Difficult to say whether he is symptomatic.  Poorly communicative.  He will need permanent pacemaker in view of his underlying  conducting system disease.  We'll need to discuss with his power of .  2.  Hypertension: Blood pressure improved.  3.  Hyponatremia: Per nephrology       Plan:     Portions of the above entered by Gabby Veliz RN have been validated by myself.    Thank you for allowing me to participate in the care of Trever Petit. Feel free to contact me directly with any further questions or concerns.    Abhishek Zaldivar MD  [facility]  11/12/20  08:41 EST  Addendum: I spoke with the patient's brother Mr. Les Petit this morning concerning my concerns about Mr. Trever Petit bradycardia.  I explained that a permanent pacemaker would be needed at this point because of persistent bradycardia that is now worsening.  The patient has underlying conduction system disease with a bundle branch block.  I explained the potential complications in addition to the indications.  Mr. Petit who is Trever Petit primary caregiver and decision maker has agreed to proceed.  We will plan tomorrow.

## 2020-11-12 NOTE — PLAN OF CARE
Goal Outcome Evaluation:  Plan of Care Reviewed With: patient  Progress: no change  Outcome Summary: Bradycardic in 30's during shift. Cardio consult for this am per Dr. Ware. Pt has periods where he doesnt respond but has a blank stare. Awake this am and alert. Talks slow but states he is hungry. D/W with Dr. Ware. No emesis during shift. Safety maintained.

## 2020-11-12 NOTE — NURSING NOTE
CWON consult received. Patient with a darkened area to left lateral foot. Area is dry, flat and measures 1.2 cm x 0.5 cm and is stable. This is consistent with an old blood blister that is resolving.     Skin to buttocks assessed. Coccyx is red/light purple in color, intact, and blanchable. Will recommend Optifoam dressing for protection and PI prevention. Patient is currently on a low air loss mattress and heels are offloaded with heel boots.     Discussed with primary RN. Thank you for consult and please call with any questions.

## 2020-11-13 LAB
ALBUMIN SERPL-MCNC: 3 G/DL (ref 3.5–5.2)
ANION GAP SERPL CALCULATED.3IONS-SCNC: 2.9 MMOL/L (ref 5–15)
BUN SERPL-MCNC: 14 MG/DL (ref 8–23)
BUN/CREAT SERPL: 23 (ref 7–25)
CALCIUM SPEC-SCNC: 8.3 MG/DL (ref 8.6–10.5)
CHLORIDE SERPL-SCNC: 95 MMOL/L (ref 98–107)
CO2 SERPL-SCNC: 29.1 MMOL/L (ref 22–29)
CREAT SERPL-MCNC: 0.61 MG/DL (ref 0.76–1.27)
DEPRECATED RDW RBC AUTO: 40.6 FL (ref 37–54)
ERYTHROCYTE [DISTWIDTH] IN BLOOD BY AUTOMATED COUNT: 11.7 % (ref 12.3–15.4)
GFR SERPL CREATININE-BSD FRML MDRD: 133 ML/MIN/1.73
GLUCOSE BLDC GLUCOMTR-MCNC: 160 MG/DL (ref 70–130)
GLUCOSE BLDC GLUCOMTR-MCNC: 90 MG/DL (ref 70–130)
GLUCOSE BLDC GLUCOMTR-MCNC: 99 MG/DL (ref 70–130)
GLUCOSE SERPL-MCNC: 75 MG/DL (ref 65–99)
HCT VFR BLD AUTO: 31 % (ref 37.5–51)
HGB BLD-MCNC: 10.9 G/DL (ref 13–17.7)
MCH RBC QN AUTO: 33.2 PG (ref 26.6–33)
MCHC RBC AUTO-ENTMCNC: 35.2 G/DL (ref 31.5–35.7)
MCV RBC AUTO: 94.5 FL (ref 79–97)
PHOSPHATE SERPL-MCNC: 3.2 MG/DL (ref 2.5–4.5)
PLATELET # BLD AUTO: 255 10*3/MM3 (ref 140–450)
PMV BLD AUTO: 9.3 FL (ref 6–12)
POTASSIUM SERPL-SCNC: 4 MMOL/L (ref 3.5–5.2)
RBC # BLD AUTO: 3.28 10*6/MM3 (ref 4.14–5.8)
SODIUM SERPL-SCNC: 127 MMOL/L (ref 136–145)
VANCOMYCIN TROUGH SERPL-MCNC: 16.7 MCG/ML (ref 5–20)
WBC # BLD AUTO: 7.02 10*3/MM3 (ref 3.4–10.8)

## 2020-11-13 PROCEDURE — 85027 COMPLETE CBC AUTOMATED: CPT | Performed by: INTERNAL MEDICINE

## 2020-11-13 PROCEDURE — 25010000002 VANCOMYCIN PER 500 MG: Performed by: INTERNAL MEDICINE

## 2020-11-13 PROCEDURE — 25010000002 FENTANYL CITRATE (PF) 100 MCG/2ML SOLUTION: Performed by: INTERNAL MEDICINE

## 2020-11-13 PROCEDURE — 80069 RENAL FUNCTION PANEL: CPT | Performed by: NURSE PRACTITIONER

## 2020-11-13 PROCEDURE — 80202 ASSAY OF VANCOMYCIN: CPT | Performed by: INTERNAL MEDICINE

## 2020-11-13 PROCEDURE — 33208 INSRT HEART PM ATRIAL & VENT: CPT | Performed by: INTERNAL MEDICINE

## 2020-11-13 PROCEDURE — 25010000002 MIDAZOLAM PER 1 MG: Performed by: INTERNAL MEDICINE

## 2020-11-13 PROCEDURE — C1898 LEAD, PMKR, OTHER THAN TRANS: HCPCS | Performed by: INTERNAL MEDICINE

## 2020-11-13 PROCEDURE — C1785 PMKR, DUAL, RATE-RESP: HCPCS | Performed by: INTERNAL MEDICINE

## 2020-11-13 PROCEDURE — 82962 GLUCOSE BLOOD TEST: CPT

## 2020-11-13 PROCEDURE — C1894 INTRO/SHEATH, NON-LASER: HCPCS | Performed by: INTERNAL MEDICINE

## 2020-11-13 PROCEDURE — 25010000003 LIDOCAINE 1 % SOLUTION: Performed by: INTERNAL MEDICINE

## 2020-11-13 PROCEDURE — 99153 MOD SED SAME PHYS/QHP EA: CPT | Performed by: INTERNAL MEDICINE

## 2020-11-13 PROCEDURE — 0JH606Z INSERTION OF PACEMAKER, DUAL CHAMBER INTO CHEST SUBCUTANEOUS TISSUE AND FASCIA, OPEN APPROACH: ICD-10-PCS | Performed by: INTERNAL MEDICINE

## 2020-11-13 PROCEDURE — 99152 MOD SED SAME PHYS/QHP 5/>YRS: CPT | Performed by: INTERNAL MEDICINE

## 2020-11-13 PROCEDURE — 02H63JZ INSERTION OF PACEMAKER LEAD INTO RIGHT ATRIUM, PERCUTANEOUS APPROACH: ICD-10-PCS | Performed by: INTERNAL MEDICINE

## 2020-11-13 PROCEDURE — C1769 GUIDE WIRE: HCPCS | Performed by: INTERNAL MEDICINE

## 2020-11-13 PROCEDURE — 02HK3JZ INSERTION OF PACEMAKER LEAD INTO RIGHT VENTRICLE, PERCUTANEOUS APPROACH: ICD-10-PCS | Performed by: INTERNAL MEDICINE

## 2020-11-13 DEVICE — IMPLANTABLE DEVICE: Type: IMPLANTABLE DEVICE | Status: FUNCTIONAL

## 2020-11-13 DEVICE — PACEMAKER
Type: IMPLANTABLE DEVICE | Status: FUNCTIONAL
Brand: ACCOLADE™ MRI DR

## 2020-11-13 RX ORDER — VANCOMYCIN HYDROCHLORIDE 1 G/200ML
INJECTION, SOLUTION INTRAVENOUS CONTINUOUS PRN
Status: COMPLETED | OUTPATIENT
Start: 2020-11-13 | End: 2020-11-13

## 2020-11-13 RX ORDER — AMOXICILLIN AND CLAVULANATE POTASSIUM 875; 125 MG/1; MG/1
1 TABLET, FILM COATED ORAL EVERY 12 HOURS SCHEDULED
Status: COMPLETED | OUTPATIENT
Start: 2020-11-13 | End: 2020-11-16

## 2020-11-13 RX ORDER — LIDOCAINE HYDROCHLORIDE 10 MG/ML
INJECTION, SOLUTION INFILTRATION; PERINEURAL AS NEEDED
Status: DISCONTINUED | OUTPATIENT
Start: 2020-11-13 | End: 2020-11-13 | Stop reason: HOSPADM

## 2020-11-13 RX ORDER — MIDAZOLAM HYDROCHLORIDE 1 MG/ML
INJECTION INTRAMUSCULAR; INTRAVENOUS AS NEEDED
Status: DISCONTINUED | OUTPATIENT
Start: 2020-11-13 | End: 2020-11-13 | Stop reason: HOSPADM

## 2020-11-13 RX ORDER — OXYCODONE HYDROCHLORIDE AND ACETAMINOPHEN 5; 325 MG/1; MG/1
1 TABLET ORAL EVERY 4 HOURS PRN
Status: DISCONTINUED | OUTPATIENT
Start: 2020-11-13 | End: 2020-11-20 | Stop reason: HOSPADM

## 2020-11-13 RX ORDER — FENTANYL CITRATE 50 UG/ML
INJECTION, SOLUTION INTRAMUSCULAR; INTRAVENOUS AS NEEDED
Status: DISCONTINUED | OUTPATIENT
Start: 2020-11-13 | End: 2020-11-13 | Stop reason: HOSPADM

## 2020-11-13 RX ORDER — SODIUM CHLORIDE 0.9 % (FLUSH) 0.9 %
10 SYRINGE (ML) INJECTION AS NEEDED
Status: DISCONTINUED | OUTPATIENT
Start: 2020-11-13 | End: 2020-11-20 | Stop reason: HOSPADM

## 2020-11-13 RX ORDER — SODIUM CHLORIDE 0.9 % (FLUSH) 0.9 %
3 SYRINGE (ML) INJECTION EVERY 12 HOURS SCHEDULED
Status: DISCONTINUED | OUTPATIENT
Start: 2020-11-13 | End: 2020-11-20 | Stop reason: HOSPADM

## 2020-11-13 RX ORDER — SODIUM CHLORIDE 1000 MG
1 TABLET, SOLUBLE MISCELLANEOUS
Status: DISPENSED | OUTPATIENT
Start: 2020-11-13 | End: 2020-11-16

## 2020-11-13 RX ORDER — METOPROLOL SUCCINATE 25 MG/1
25 TABLET, EXTENDED RELEASE ORAL DAILY
Status: DISCONTINUED | OUTPATIENT
Start: 2020-11-13 | End: 2020-11-20 | Stop reason: HOSPADM

## 2020-11-13 RX ADMIN — TAMSULOSIN HYDROCHLORIDE 0.4 MG: 0.4 CAPSULE ORAL at 20:55

## 2020-11-13 RX ADMIN — Medication 5 MG: at 20:55

## 2020-11-13 RX ADMIN — OXCARBAZEPINE 600 MG: 300 TABLET, FILM COATED ORAL at 22:09

## 2020-11-13 RX ADMIN — DIVALPROEX SODIUM 250 MG: 250 TABLET, DELAYED RELEASE ORAL at 18:35

## 2020-11-13 RX ADMIN — AMOXICILLIN AND CLAVULANATE POTASSIUM 1 TABLET: 875; 125 TABLET, FILM COATED ORAL at 20:55

## 2020-11-13 RX ADMIN — OXYCODONE HYDROCHLORIDE AND ACETAMINOPHEN 1 TABLET: 5; 325 TABLET ORAL at 22:09

## 2020-11-13 RX ADMIN — SODIUM CHLORIDE, PRESERVATIVE FREE 10 ML: 5 INJECTION INTRAVENOUS at 09:26

## 2020-11-13 RX ADMIN — METOPROLOL SUCCINATE 25 MG: 25 TABLET, EXTENDED RELEASE ORAL at 20:55

## 2020-11-13 RX ADMIN — SODIUM CHLORIDE, PRESERVATIVE FREE 3 ML: 5 INJECTION INTRAVENOUS at 20:56

## 2020-11-13 RX ADMIN — AMLODIPINE BESYLATE 10 MG: 10 TABLET ORAL at 09:26

## 2020-11-13 RX ADMIN — VANCOMYCIN HYDROCHLORIDE 1000 MG: 1 INJECTION, SOLUTION INTRAVENOUS at 06:39

## 2020-11-13 RX ADMIN — DIVALPROEX SODIUM 250 MG: 250 TABLET, DELAYED RELEASE ORAL at 09:26

## 2020-11-13 RX ADMIN — SODIUM CHLORIDE TAB 1 GM 1 G: 1 TAB at 18:35

## 2020-11-13 RX ADMIN — ATORVASTATIN CALCIUM 20 MG: 20 TABLET, FILM COATED ORAL at 09:26

## 2020-11-13 RX ADMIN — DIVALPROEX SODIUM 250 MG: 250 TABLET, DELAYED RELEASE ORAL at 20:55

## 2020-11-13 RX ADMIN — OXCARBAZEPINE 600 MG: 300 TABLET, FILM COATED ORAL at 09:26

## 2020-11-13 NOTE — PROGRESS NOTES
Adult Nutrition  Assessment/PES    Patient Name:  Trever Petit  YOB: 1955  MRN: [id number]  Admit Date:  11/8/2020    Assessment Date:  11/13/2020  Nutrition follow up.  Reason for Assessment     Row Name 11/13/20 1412          Reason for Assessment    Reason For Assessment  follow-up protocol         Nutrition/Diet History     Row Name 11/13/20 1412          Nutrition/Diet History    Typical Food/Fluid Intake  NPO for pacemaker today;has been on mech soft diet, average 50% of meals, nutritional supplement mighty shakes ordered TID           Labs/Tests/Procedures/Meds     Row Name 11/13/20 1412          Labs/Procedures/Meds    Lab Results Reviewed  reviewed, pertinent     Lab Results Comments  Na        Diagnostic Tests/Procedures    Diagnostic Test/Procedure Reviewed  reviewed, pertinent        Medications    Pertinent Medications Reviewed  reviewed, pertinent         Physical Findings     Row Name 11/13/20 1412          Physical Findings    Overall Physical Appearance  underweight;loss of muscle mass           Nutrition Prescription Ordered     Row Name 11/13/20 1413          Nutrition Prescription PO    Current PO Diet  NPO         Evaluation of Received Nutrient/Fluid Intake     Row Name 11/13/20 1413          PO Evaluation    % PO Intake  has been on mech soft diet, average 50% of meals         Problem/Interventions:    Intervention Goal     Row Name 11/13/20 1414          Intervention Goal    General  Maintain nutrition;Reduce/improve symptoms;Meet nutritional needs for age/condition     PO  Tolerate PO     Weight  Maintain weight         Nutrition Intervention     Row Name 11/13/20 1414          Nutrition Intervention    RD/Tech Action  Follow Tx progress;Care plan reviewd           Education/Evaluation     Row Name 11/13/20 1414          Education    Education  Will Instruct as appropriate        Monitor/Evaluation    Monitor  I&O;Per protocol;PO intake;Supplement intake;Pertinent  labs;Weight;Skin status           Electronically signed by:  Shefali Barcenas RD  11/13/20 14:14 EST

## 2020-11-13 NOTE — PLAN OF CARE
Goal Outcome Evaluation:  Plan of Care Reviewed With: patient  Progress: no change  Outcome Summary: HR remains in the 40s. Cardiology consulted and plan for pt to have pacemaker insertion tomorrow. Permit signed by pt's brother. Pt talkative most of the day. Fed by NA and took diet well Lungs diminished. VSS. Norvasc increased to 10 mg daily. Will continue to monitor. Safety maintained.

## 2020-11-13 NOTE — PLAN OF CARE
Goal Outcome Evaluation:  Plan of Care Reviewed With: patient  Progress: no change  Outcome Summary: Continue to monitor vs, labs, heart rate remains between 30's and 40's, NPO for pacemaker this am, turned q2, use of bed alarm, safety maintained.

## 2020-11-13 NOTE — PROGRESS NOTES
"   LOS: 5 days     Chief Complaint/ Reason for encounter: Hyponatremia   Chief Complaint   Patient presents with   • Lip Laceration   • Fall         Subjective   Feels better, more alert and conversant  No soa or CP  No fevers or chills, no edema    Medical history reviewed:  History of Present Illness    Subjective    History taken from: Patient and chart    Vital Signs  Temp:  [97.4 °F (36.3 °C)-97.7 °F (36.5 °C)] 97.7 °F (36.5 °C)  Heart Rate:  [41-43] 43  Resp:  [14-18] 14  BP: (164-184)/(80-92) 175/92       Wt Readings from Last 1 Encounters:   11/13/20 0550 58.2 kg (128 lb 4.8 oz)   11/09/20 0057 55.8 kg (123 lb)   11/08/20 1850 65.8 kg (145 lb)       Objective:  Vital signs: (most recent): Blood pressure 175/92, pulse (!) 43, temperature 97.7 °F (36.5 °C), temperature source Oral, resp. rate 14, height 172.7 cm (67.99\"), weight 58.2 kg (128 lb 4.8 oz), SpO2 99 %.          General Appearance:  Comfortable, malnourished and chronically ill-appearing, in no acute distress and not in pain.  Awake, alert, oriented  HEENT: Mucous membranes moist, no injury, oropharynx clear  Lungs:  Normal effort and normal respiratory rate.  Breath sounds clear to auscultation.  No  respiratory distress.  No rales, decreased breath sounds or rhonchi.    Heart: Bradycardia 30-35.  Regular rhythm.  S1 normal.  No murmur.   Abdomen: Abdomen is soft.  Bowel sounds are normal, no abdominal tenderness.  There is no rebound or guarding  Extremities: Normal range of motion.  No edema of bilateral lower extremities, distal pulses intact  Neurological: No focal motor or sensory deficits, pupils reactive  Skin:  Warm and dry.  No rash or cyanosis.       Results Review:    Intake/Output:     Intake/Output Summary (Last 24 hours) at 11/13/2020 1300  Last data filed at 11/13/2020 1115  Gross per 24 hour   Intake 460 ml   Output --   Net 460 ml         DATA:  Radiology and Labs:  The following labs independently reviewed by me. Additional labs " ordered for tomorrow a.m.  Interval notes, chart personally reviewed by me.   Risk/ complexity of medical care/ medical decision making high    Labs:   Recent Results (from the past 24 hour(s))   POC Glucose Once    Collection Time: 11/12/20  4:08 PM    Specimen: Blood   Result Value Ref Range    Glucose 91 70 - 130 mg/dL   POC Glucose Once    Collection Time: 11/12/20  9:43 PM    Specimen: Blood   Result Value Ref Range    Glucose 116 70 - 130 mg/dL   Vancomycin, Trough    Collection Time: 11/13/20  4:57 AM    Specimen: Blood   Result Value Ref Range    Vancomycin Trough 16.70 5.00 - 20.00 mcg/mL   Renal Function Panel    Collection Time: 11/13/20  4:57 AM    Specimen: Blood   Result Value Ref Range    Glucose 75 65 - 99 mg/dL    BUN 14 8 - 23 mg/dL    Creatinine 0.61 (L) 0.76 - 1.27 mg/dL    Sodium 127 (L) 136 - 145 mmol/L    Potassium 4.0 3.5 - 5.2 mmol/L    Chloride 95 (L) 98 - 107 mmol/L    CO2 29.1 (H) 22.0 - 29.0 mmol/L    Calcium 8.3 (L) 8.6 - 10.5 mg/dL    Albumin 3.00 (L) 3.50 - 5.20 g/dL    Phosphorus 3.2 2.5 - 4.5 mg/dL    Anion Gap 2.9 (L) 5.0 - 15.0 mmol/L    BUN/Creatinine Ratio 23.0 7.0 - 25.0    eGFR Non African Amer 133 >60 mL/min/1.73   CBC (No Diff)    Collection Time: 11/13/20  4:57 AM    Specimen: Blood   Result Value Ref Range    WBC 7.02 3.40 - 10.80 10*3/mm3    RBC 3.28 (L) 4.14 - 5.80 10*6/mm3    Hemoglobin 10.9 (L) 13.0 - 17.7 g/dL    Hematocrit 31.0 (L) 37.5 - 51.0 %    MCV 94.5 79.0 - 97.0 fL    MCH 33.2 (H) 26.6 - 33.0 pg    MCHC 35.2 31.5 - 35.7 g/dL    RDW 11.7 (L) 12.3 - 15.4 %    RDW-SD 40.6 37.0 - 54.0 fl    MPV 9.3 6.0 - 12.0 fL    Platelets 255 140 - 450 10*3/mm3   POC Glucose Once    Collection Time: 11/13/20  6:49 AM    Specimen: Blood   Result Value Ref Range    Glucose 90 70 - 130 mg/dL   POC Glucose Once    Collection Time: 11/13/20 10:56 AM    Specimen: Blood   Result Value Ref Range    Glucose 99 70 - 130 mg/dL       Radiology:  Imaging Results (Last 24 Hours)     ** No  results found for the last 24 hours. **             Medications have been reviewed:  Current Facility-Administered Medications   Medication Dose Route Frequency Provider Last Rate Last Dose   • acetaminophen (TYLENOL) tablet 650 mg  650 mg Oral Q6H George Chen MD   650 mg at 11/11/20 2138   • amLODIPine (NORVASC) tablet 10 mg  10 mg Oral Q24H Jeff Allen MD   10 mg at 11/13/20 0926   • aspirin EC tablet 81 mg  81 mg Oral Daily George Ware MD   81 mg at 11/12/20 0853   • atorvastatin (LIPITOR) tablet 20 mg  20 mg Oral Daily George Ware MD   20 mg at 11/13/20 0926   • dextrose (D50W) 25 g/ 50mL Intravenous Solution 25 g  25 g Intravenous Q15 Min George Chen MD       • dextrose (GLUTOSE) oral gel 15 g  15 g Oral Q15 Min George Chen MD       • divalproex (DEPAKOTE) DR tablet 250 mg  250 mg Oral TID George Ware MD   250 mg at 11/13/20 0926   • glucagon (human recombinant) (GLUCAGEN DIAGNOSTIC) injection 1 mg  1 mg Subcutaneous Q15 Min George Chen MD       • insulin lispro (humaLOG) injection 0-7 Units  0-7 Units Subcutaneous TID AC George Ware MD       • melatonin tablet 5 mg  5 mg Oral Nightly George Ware MD   5 mg at 11/12/20 2059   • OXcarbazepine (TRILEPTAL) tablet 600 mg  600 mg Oral Q12H George Ware MD   600 mg at 11/13/20 0926   • Pharmacy to dose vancomycin   Does not apply Continuous George Chen MD       • sodium chloride 0.9 % flush 10 mL  10 mL Intravenous George Chen MD   10 mL at 11/10/20 2018   • sodium chloride 0.9 % flush 10 mL  10 mL Intravenous George Chen MD   10 mL at 11/13/20 0926   • sodium chloride tablet 1 g  1 g Oral TID With Meals Jeff Allen MD       • tamsulosin (FLOMAX) 24 hr capsule 0.4 mg  0.4 mg Oral Nightly George Ware MD   0.4 mg at 11/12/20 2059   • vancomycin (VANCOCIN) in iso-osmotic dextrose IVPB 1 g (premix) 200 mL  1,000 mg Intravenous Q12H Jeanie,  George BLANDON MD   1,000 mg at 11/13/20 0639       ASSESSMENT:   Hyponatremia  UTI  HTN  DM  Swallowing impairment  Severe malnutrition   Bradycardia      PLAN:   Labs are consistent with 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  He looks euvolemic on exam  Trileptal, lisinopril and Depakote could be exacerbating as well, but hyponatremia is mild  Add sodium chloride tablets every hours  No think he is taking in enough to warrant a fluid restriction at this time  Cardiology planning pacemaker placement  Follow-up a.mEmanuel Allen MD  [facility]   Office phone number: [phone]  Answering service phone number: [phone]    11/13/20  13:00 EST

## 2020-11-13 NOTE — PROGRESS NOTES
Continued Stay Note  [facility]     Patient Name: Trever Petit  MRN: [id number]  Today's Date: 11/13/2020    Admit Date: 11/8/2020    Discharge Plan     Row Name 11/13/20 1501       Plan    Plan  [facility]    Patient/Family in Agreement with Plan  yes    Plan Comments  Patient gettiing a pacemaker today.  Plan is to return to [facility] level when ready for dc.  EMS for transport.        Discharge Codes    No documentation.             Shannon Epley, RN

## 2020-11-13 NOTE — PROGRESS NOTES
"Pharmacy to dose Vancomycin    Day 3  Consult for Dr Ware  Treating: UTI  Duration: 5 days    Current Vancomycin dose 1gm Q12h  Lab Results   Component Value Date    PAUL 16.70 11/13/2020       Anti-Infectives (From admission, onward)      Ordered     Dose/Rate Route Frequency Start Stop    11/11/20 1514  vancomycin (VANCOCIN) in iso-osmotic dextrose IVPB 1 g (premix) 200 mL     Ordering Provider: George Ware MD    1,000 mg  over 60 Minutes Intravenous Every 12 Hours 11/12/20 0500 11/17/20 0459    11/11/20 1500  vancomycin 1250 mg/250 mL 0.9% NS IVPB ([facility])     Ordering Provider: George Ware MD    1,250 mg  over 75 Minutes Intravenous Once 11/11/20 1600 11/11/20 1804    11/11/20 1453  Pharmacy to dose vancomycin     Ordering Provider: George Ware MD     Does not apply Continuous PRN 11/11/20 1451 11/16/20 1450             Relevant clinical data and objective history reviewed:  64 y.o. male 172.7 cm (67.99\") 58.2 kg (128 lb 4.8 oz)    Creatinine   Date Value Ref Range Status   11/13/2020 0.61 (L) 0.76 - 1.27 mg/dL Final   11/12/2020 0.47 (L) 0.76 - 1.27 mg/dL Final   11/11/2020 0.60 (L) 0.76 - 1.27 mg/dL Final   06/08/2019 1.3 0.7 - 1.5 mg/dL Final     Estimated Creatinine Clearance: 100.7 mL/min (A) (by C-G formula based on SCr of 0.61 mg/dL (L)).    WBC   Date Value Ref Range Status   11/13/2020 7.02 3.40 - 10.80 10*3/mm3 Final   11/12/2020 6.65 3.40 - 10.80 10*3/mm3 Final   11/11/2020 8.48 3.40 - 10.80 10*3/mm3 Final   06/08/2019 10.48 4.5 - 11.0 10*3/uL Final     Temp Readings from Last 3 Encounters:   11/13/20 97.3 °F (36.3 °C) (Oral)   05/27/20 97.3 °F (36.3 °C) (Oral)   03/04/20 98.2 °F (36.8 °C) (Oral)       Baseline cultures:  11/9 urine + e faecalis    Pharmacy is now dosing vancomycin to Target an AUC24 of 400-600 mg*h/L. Bayesian analysis of the most recent level(s) using [employer] is used in conjunction with Epic to perform the calcuations.    Using these values, the current " "regimen gives a predicted steady-state trough of 21 mg/L and AUC24 of 660 mg*h/L. If the predicted trough on this regimen is not within what was previously considered a \"target trough range\", the AUC24 (a stronger predictor of vancomycin efficacy) is predicted to achieve the accepted target of 400-600 mg*h/L. To best balance efficacy and toxicity, we will be targeting AUC24 in this patient rather than steady-state trough levels    PLAN:     1.  Based on level this am, will adjust dose to 750mg Q12h for a calculated SS AUC of 501 and trough of 15.9.  Will plan to recheck level at 1630 on 11/14.  Will adjust as needed.     2. Encourage hydration as able per MD order to prevent toxic accumulation, and monitor for sign of toxicity such as increased SCr, decreased UOP and ringing in the ears.     3. Pharmacy will continue to follow daily while on vancomycin and adjust as needed.     Janice DhillonD, BCPS  11/13/20 13:46 EST            "

## 2020-11-13 NOTE — PROGRESS NOTES
"DAILY PROGRESS NOTE  [facility], [facility]    2020    Patient Identification:  Name: Trever Petit  Age: 64 y.o.  Sex: male  :  1955  MRN: [id number]           Primary Care Physician: George Ware MD    Subjective:    Interval History:    No new events overnight  Still bradycardic  To have pacemaker today  Blood sugar okay  Creatinine 0.61, sodium 127, hemoglobin 10.9.  Urine cultures growing Enterococcus faecalis sensitive to ampicillin, vancomycin, nitrofurantoin.    ROS:     There is no report for nausea, vomiting, diarrhea, constipation, chest pain, shortness of breath.    Objective:    Scheduled Meds:  amLODIPine, 10 mg, Oral, Q24H  aspirin, 81 mg, Oral, Daily  atorvastatin, 20 mg, Oral, Daily  divalproex, 250 mg, Oral, TID  insulin lispro, 0-7 Units, Subcutaneous, TID AC  melatonin, 5 mg, Oral, Nightly  OXcarbazepine, 600 mg, Oral, Q12H  sodium chloride, 1 g, Oral, TID With Meals  tamsulosin, 0.4 mg, Oral, Nightly  vancomycin, 750 mg, Intravenous, Q12H        Continuous Infusions:  Pharmacy to dose vancomycin,         PRN Meds:  •  acetaminophen  •  dextrose  •  dextrose  •  glucagon (human recombinant)  •  Pharmacy to dose vancomycin  •  sodium chloride  •  [COMPLETED] Insert peripheral IV **AND** sodium chloride    Intake/Output:    Intake/Output Summary (Last 24 hours) at 2020 1513  Last data filed at 2020 1115  Gross per 24 hour   Intake 220 ml   Output --   Net 220 ml         Exam:    tMax 24 hrs: Temp (24hrs), Av.5 °F (36.4 °C), Min:97.3 °F (36.3 °C), Max:97.7 °F (36.5 °C)    Vitals Ranges:   Temp:  [97.3 °F (36.3 °C)-97.7 °F (36.5 °C)] 97.3 °F (36.3 °C)  Heart Rate:  [41-43] 43  Resp:  [14-18] 14  BP: (137-184)/(72-92) 137/72    /72 (BP Location: Right arm, Patient Position: Lying)   Pulse (!) 43   Temp 97.3 °F (36.3 °C) (Oral)   Resp 14   Ht 172.7 cm (67.99\")   Wt 58.2 kg (128 lb 4.8 oz) Comment: wihtout pumps on " bottom of bed  SpO2 100%   BMI 19.51 kg/m²     General: Patient is awake, Oriented x 1-2. No respiratory distress  Oropharynx: Contusion and abrasion of the left aspect of patient's chin.  Poor dentition.  Laceration intraorally at the frenulum, healing.  Contusion of left sided lip.  Neck: Supple, symmetrical, trachea midline, no adenopathy;              thyroid:  no enlargement/tenderness/nodules;              no carotid bruit or JVD  Cardiovascular: Normal rate, regular rhythm and intact distal pulses.              Exam reveals no gallop and no friction rub. No murmur heard  Pulmonary: Clear to auscultation bilaterally, respirations unlabored.               No rhonchi, wheezing or rales.   Abdominal: Soft, nontender, bowel sounds active all four quadrants,     no masses, no hepatomegaly, no splenomegaly.  Cuellar in place  Extremities: Normal, atraumatic, able to move upper extremity, no much movement of the lower extremity.  Pulses: 2 + symmetric all extremities  Neurological: Patient is awake, responsive to questioning, oriented x1-2.  No new focal sensorimotor deficit.    Skin: Skin color, texture, normal. Turgor is decreased. No rashes or lesions       Data Review:    Results from last 7 days   Lab Units 11/13/20  0457 11/12/20  0547 11/11/20  0342   WBC 10*3/mm3 7.02 6.65 8.48   HEMOGLOBIN g/dL 10.9* 10.4* 10.6*   HEMATOCRIT % 31.0* 30.5* 30.4*   PLATELETS 10*3/mm3 255 221 227       Results from last 7 days   Lab Units 11/13/20  0457 11/12/20  0547 11/11/20  0342  11/08/20  1849   SODIUM mmol/L 127* 129* 131*   < > 131*   POTASSIUM mmol/L 4.0 3.9 4.4   < > 5.1   CHLORIDE mmol/L 95* 98 97*   < > 93*   CO2 mmol/L 29.1* 26.7 25.6   < > 32.1*   BUN mg/dL 14 13 17   < > 20   CREATININE mg/dL 0.61* 0.47* 0.60*   < > 0.82   CALCIUM mg/dL 8.3* 8.3* 8.0*   < > 8.6   BILIRUBIN mg/dL  --   --   --   --  <0.2   ALK PHOS U/L  --   --   --   --  49   ALT (SGPT) U/L  --   --   --   --  15   AST (SGOT) U/L  --   --   --    --  18   GLUCOSE mg/dL 75 79 97   < > 95    < > = values in this interval not displayed.           Results from last 7 days   Lab Units 11/09/20  0520   HEMOGLOBIN A1C % 5.18       Lab Results   Lab Value Date/Time    TROPONINT 0.019 05/23/2020 0921    TROPONINT <0.010 08/30/2019 0443    TROPONINT <0.010 08/28/2019 1308    TROPONINT <0.010 08/19/2019 1727    TROPONINT <0.010 08/19/2019 1538    TROPONINT <0.010 06/25/2019 1703    TROPONINT <0.010 06/25/2019 1502    TROPONINT <0.010 06/22/2019 0028    TROPONINT <0.010 06/21/2019 1846       Microbiology Results (last 10 days)     Procedure Component Value - Date/Time    Urine Culture - Urine, Urine, Catheter In/Out [[id number]]  (Abnormal)  (Susceptibility) Collected: 11/09/20 2102    Lab Status: Final result Specimen: Urine, Catheter In/Out Updated: 11/12/20 0142     Urine Culture >100,000 CFU/mL Enterococcus faecalis    Susceptibility      Enterococcus faecalis     ALLISON     Ampicillin Susceptible     Levofloxacin Susceptible     Nitrofurantoin Susceptible     Tetracycline Resistant     Vancomycin Susceptible                    COVID PRE-OP / PRE-PROCEDURE SCREENING ORDER (NO ISOLATION) - Swab, Nasopharynx [[id number]]  (Normal) Collected: 11/08/20 2333    Lab Status: Final result Specimen: Swab from Nasopharynx Updated: 11/09/20 0053    Narrative:      The following orders were created for panel order COVID PRE-OP / PRE-PROCEDURE SCREENING ORDER (NO ISOLATION) - Swab, Nasopharynx.  Procedure                               Abnormality         Status                     ---------                               -----------         ------                     Respiratory Panel PCR w/...[[id number]]  Normal              Final result                 Please view results for these tests on the individual orders.    Respiratory Panel PCR w/COVID-19(SARS-CoV-2) [facility] In-House, NP Swab in UTM/VTM, 3-4 HR TAT - Swab, Nasopharynx [[id number]]  (Normal) Collected:  11/08/20 2333    Lab Status: Final result Specimen: Swab from Nasopharynx Updated: 11/09/20 0053     ADENOVIRUS, PCR Not Detected     Coronavirus 229E Not Detected     Coronavirus HKU1 Not Detected     Coronavirus NL63 Not Detected     Coronavirus OC43 Not Detected     COVID19 Not Detected     Human Metapneumovirus Not Detected     Human Rhinovirus/Enterovirus Not Detected     Influenza A PCR Not Detected     Influenza B PCR Not Detected     Parainfluenza Virus 1 Not Detected     Parainfluenza Virus 2 Not Detected     Parainfluenza Virus 3 Not Detected     Parainfluenza Virus 4 Not Detected     RSV, PCR Not Detected     Bordetella pertussis pcr Not Detected     Bordetella parapertussis PCR Not Detected     Chlamydophila pneumoniae PCR Not Detected     Mycoplasma pneumo by PCR Not Detected    Narrative:      Fact sheet for providers: https://docs.[employer]/wp-content/uploads/XKA0236-0374-RW6.1-EUA-Provider-Fact-Sheet-3.pdf    Fact sheet for patients: https://docs.[employer]/wp-content/uploads/CRW8434-0456-LR4.1-EUA-Patient-Fact-Sheet-1.pdf           Imaging Results (Last 7 Days)     Procedure Component Value Units Date/Time    CT Head Without Contrast [[id number]] Collected: 11/08/20 2217     Updated: 11/08/20 2231    Narrative:      CT HEAD WITHOUT CONTRAST:      HISTORY:  Fall out of bed from nursing home.     TECHNIQUE:  Axial images were obtained through the brain without  contrast administration. Multiplanar reformatted images were  reconstructed from the helical source data. Radiation dose reduction  techniques were utilized, including automated exposure control and  exposure modulation based on body size.        COMPARISON: 05/23/2020.     FINDINGS:   Extensive encephalomalacia throughout the right hemisphere from prior  right MCA infarction.     Stable ex vacuo dilatation of the right lateral ventricle.     Stable moderate cerebral volume loss with proportionate prominence of  the remaining ventricular  system and sulci. No hydrocephalus or midline  shift.      Stable moderate scattered hypodensity throughout the periventricular and  subcortical white matter that is most likely secondary to chronic  microvascular ischemia. There is no evidence of a large territorial  infarction. No hemorrhage or extra-axial fluid collection. Intracranial  atherosclerotic arterial calcifications.      The orbits are unremarkable. The visualized paranasal sinuses and left  mastoid air cells are clear. Moderate opacification of the right mastoid  air cells.       The calvarium is intact. The scalp soft tissues are unremarkable.       Impression:      1.   No acute intracranial abnormality.   2.  Sequela of prior right MCA infarction.  3.  Moderate right mastoid effusion.  4.  Senescent changes.           CERVICAL SPINE CT:     HISTORY:  Follow-up from nursing home.     TECHNIQUE:  Helical axial images were performed from the skull base  through the upper thoracic spine without contrast. Sagittal and coronal  reformatted images were performed. Radiation dose reduction techniques  were utilized, including automated exposure control and exposure  modulation based on body size.        COMPARISON:  05/23/2020.     FINDINGS:    Chronic fracture of the left C1 ring with healing along the anterior  aspect and nonunion at the posterior aspect. No evidence of acute  fracture. Stable fusion hardware at C4-C7 without hardware complication.  Straightening of the normal cervical lordosis. Stable multilevel  spondylosis. No high-grade spinal canal narrowing. The paravertebral  soft tissues are normal. The lung apices are clear.     IMPRESSION:   1.  No evidence of acute fracture.  2.  Chronic left C1 fracture with partial healing and evidence of  nonunion.  3.  Stable fusion hardware without competition.     This report was finalized on 11/8/2020 10:28 PM by Dr. Shaheen Olea M.D.       CT Cervical Spine Without Contrast [[id number]] Collected:  11/08/20 2217     Updated: 11/08/20 2231    Narrative:      CT HEAD WITHOUT CONTRAST:      HISTORY:  Fall out of bed from nursing home.     TECHNIQUE:  Axial images were obtained through the brain without  contrast administration. Multiplanar reformatted images were  reconstructed from the helical source data. Radiation dose reduction  techniques were utilized, including automated exposure control and  exposure modulation based on body size.        COMPARISON: 05/23/2020.     FINDINGS:   Extensive encephalomalacia throughout the right hemisphere from prior  right MCA infarction.     Stable ex vacuo dilatation of the right lateral ventricle.     Stable moderate cerebral volume loss with proportionate prominence of  the remaining ventricular system and sulci. No hydrocephalus or midline  shift.      Stable moderate scattered hypodensity throughout the periventricular and  subcortical white matter that is most likely secondary to chronic  microvascular ischemia. There is no evidence of a large territorial  infarction. No hemorrhage or extra-axial fluid collection. Intracranial  atherosclerotic arterial calcifications.      The orbits are unremarkable. The visualized paranasal sinuses and left  mastoid air cells are clear. Moderate opacification of the right mastoid  air cells.       The calvarium is intact. The scalp soft tissues are unremarkable.       Impression:      1.   No acute intracranial abnormality.   2.  Sequela of prior right MCA infarction.  3.  Moderate right mastoid effusion.  4.  Senescent changes.           CERVICAL SPINE CT:     HISTORY:  Follow-up from nursing home.     TECHNIQUE:  Helical axial images were performed from the skull base  through the upper thoracic spine without contrast. Sagittal and coronal  reformatted images were performed. Radiation dose reduction techniques  were utilized, including automated exposure control and exposure  modulation based on body size.        COMPARISON:   05/23/2020.     FINDINGS:    Chronic fracture of the left C1 ring with healing along the anterior  aspect and nonunion at the posterior aspect. No evidence of acute  fracture. Stable fusion hardware at C4-C7 without hardware complication.  Straightening of the normal cervical lordosis. Stable multilevel  spondylosis. No high-grade spinal canal narrowing. The paravertebral  soft tissues are normal. The lung apices are clear.     IMPRESSION:   1.  No evidence of acute fracture.  2.  Chronic left C1 fracture with partial healing and evidence of  nonunion.  3.  Stable fusion hardware without competition.     This report was finalized on 11/8/2020 10:28 PM by Dr. Shaheen Olea M.D.               Assessment:        Malignant hypertensive urgency    Severe malnutrition (CMS/HCC)    Dysphagia  Urinary tract infection per Enterococcus faecalis  Dehydration  Chronic worsening  bradycardia  Face contusion  Probably concussion  Vascular dementia with visual disturbance  COPD  Diabetes mellitus  Hypertension  Late effects of CVA  Persistent hyponatremia  SIADH    Plan:    For pacemaker placement today.  We will change antibiotics to p.o. Augmentin  We will reevaluate psychiatric medication, this seems to be the culprit for SIADH  Continue to monitor mental status, improving  Adjust insulin as needed  DVT/stress ulcer prophylaxis  PT to work with patient  Labs in       George Ware MD  11/13/2020

## 2020-11-14 LAB
ALBUMIN SERPL-MCNC: 3.2 G/DL (ref 3.5–5.2)
ANION GAP SERPL CALCULATED.3IONS-SCNC: 5.9 MMOL/L (ref 5–15)
BUN SERPL-MCNC: 16 MG/DL (ref 8–23)
BUN/CREAT SERPL: 26.2 (ref 7–25)
CALCIUM SPEC-SCNC: 8.3 MG/DL (ref 8.6–10.5)
CHLORIDE SERPL-SCNC: 94 MMOL/L (ref 98–107)
CO2 SERPL-SCNC: 27.1 MMOL/L (ref 22–29)
CREAT SERPL-MCNC: 0.61 MG/DL (ref 0.76–1.27)
DEPRECATED RDW RBC AUTO: 39.1 FL (ref 37–54)
ERYTHROCYTE [DISTWIDTH] IN BLOOD BY AUTOMATED COUNT: 11.6 % (ref 12.3–15.4)
GFR SERPL CREATININE-BSD FRML MDRD: 133 ML/MIN/1.73
GLUCOSE BLDC GLUCOMTR-MCNC: 105 MG/DL (ref 70–130)
GLUCOSE BLDC GLUCOMTR-MCNC: 108 MG/DL (ref 70–130)
GLUCOSE BLDC GLUCOMTR-MCNC: 141 MG/DL (ref 70–130)
GLUCOSE BLDC GLUCOMTR-MCNC: 94 MG/DL (ref 70–130)
GLUCOSE SERPL-MCNC: 89 MG/DL (ref 65–99)
HCT VFR BLD AUTO: 31.4 % (ref 37.5–51)
HGB BLD-MCNC: 10.9 G/DL (ref 13–17.7)
MCH RBC QN AUTO: 32.3 PG (ref 26.6–33)
MCHC RBC AUTO-ENTMCNC: 34.7 G/DL (ref 31.5–35.7)
MCV RBC AUTO: 93.2 FL (ref 79–97)
PHOSPHATE SERPL-MCNC: 3.9 MG/DL (ref 2.5–4.5)
PLATELET # BLD AUTO: 252 10*3/MM3 (ref 140–450)
PMV BLD AUTO: 8.7 FL (ref 6–12)
POTASSIUM SERPL-SCNC: 4.2 MMOL/L (ref 3.5–5.2)
RBC # BLD AUTO: 3.37 10*6/MM3 (ref 4.14–5.8)
SODIUM SERPL-SCNC: 127 MMOL/L (ref 136–145)
WBC # BLD AUTO: 8.36 10*3/MM3 (ref 3.4–10.8)

## 2020-11-14 PROCEDURE — 80069 RENAL FUNCTION PANEL: CPT | Performed by: INTERNAL MEDICINE

## 2020-11-14 PROCEDURE — 82962 GLUCOSE BLOOD TEST: CPT

## 2020-11-14 PROCEDURE — 99024 POSTOP FOLLOW-UP VISIT: CPT | Performed by: INTERNAL MEDICINE

## 2020-11-14 PROCEDURE — 85027 COMPLETE CBC AUTOMATED: CPT | Performed by: INTERNAL MEDICINE

## 2020-11-14 RX ADMIN — SODIUM CHLORIDE TAB 1 GM 1 G: 1 TAB at 18:17

## 2020-11-14 RX ADMIN — SODIUM CHLORIDE TAB 1 GM 1 G: 1 TAB at 14:40

## 2020-11-14 RX ADMIN — OXCARBAZEPINE 600 MG: 300 TABLET, FILM COATED ORAL at 10:28

## 2020-11-14 RX ADMIN — METOPROLOL SUCCINATE 25 MG: 25 TABLET, EXTENDED RELEASE ORAL at 10:27

## 2020-11-14 RX ADMIN — DIVALPROEX SODIUM 250 MG: 250 TABLET, DELAYED RELEASE ORAL at 18:17

## 2020-11-14 RX ADMIN — ATORVASTATIN CALCIUM 20 MG: 20 TABLET, FILM COATED ORAL at 10:27

## 2020-11-14 RX ADMIN — AMOXICILLIN AND CLAVULANATE POTASSIUM 1 TABLET: 875; 125 TABLET, FILM COATED ORAL at 10:27

## 2020-11-14 RX ADMIN — SODIUM CHLORIDE, PRESERVATIVE FREE 3 ML: 5 INJECTION INTRAVENOUS at 10:26

## 2020-11-14 RX ADMIN — SODIUM CHLORIDE TAB 1 GM 1 G: 1 TAB at 10:27

## 2020-11-14 RX ADMIN — ASPIRIN 81 MG: 81 TABLET, FILM COATED ORAL at 10:26

## 2020-11-14 RX ADMIN — AMLODIPINE BESYLATE 10 MG: 10 TABLET ORAL at 10:27

## 2020-11-14 RX ADMIN — DIVALPROEX SODIUM 250 MG: 250 TABLET, DELAYED RELEASE ORAL at 12:15

## 2020-11-14 NOTE — PROGRESS NOTES
[facility] Follow Up    Chief Complaint: Follow up bradycardia, status post pacemaker    Interval History: RN reports some mild bleeding at incision site.  Does not appear to be persistent.  Has mild swelling over the site that is unchanged.    Objective:     Objective:  Temp:  [97.3 °F (36.3 °C)-99.1 °F (37.3 °C)] 97.7 °F (36.5 °C)  Heart Rate:  [43-68] 60  Resp:  [14-16] 16  BP: (133-157)/(72-96) 143/93     Intake/Output Summary (Last 24 hours) at 11/14/2020 1126  Last data filed at 11/13/2020 1600  Gross per 24 hour   Intake 300 ml   Output --   Net 300 ml     Body mass index is 19.51 kg/m².      11/08/20  1850 11/09/20  0057 11/13/20  0550   Weight: 65.8 kg (145 lb) 55.8 kg (123 lb) 58.2 kg (128 lb 4.8 oz) (wihtout pumps on bottom of bed)     Weight change:       Physical Exam:   General : Alert, cooperative, in no acute distress.  Neuro: Alert,cooperative and oriented.  Lungs: CTAB. Normal respiratory effort and rate.  CV: Regular rate and rhythm, normal S1 and S2, no murmurs, gallops or rubs.  ABD: Soft, nontender, nondistended. Positive bowel sounds.  Extr: No edema or cyanosis, moves all extremities.  Chest: Mild swelling over pacemaker site that is stable appearing.  No evidence of bleeding at this time.    Lab Review:   Results from last 7 days   Lab Units 11/14/20  0532 11/13/20  0457  11/08/20  1849   SODIUM mmol/L 127* 127*   < > 131*   POTASSIUM mmol/L 4.2 4.0   < > 5.1   CHLORIDE mmol/L 94* 95*   < > 93*   CO2 mmol/L 27.1 29.1*   < > 32.1*   BUN mg/dL 16 14   < > 20   CREATININE mg/dL 0.61* 0.61*   < > 0.82   GLUCOSE mg/dL 89 75   < > 95   CALCIUM mg/dL 8.3* 8.3*   < > 8.6   AST (SGOT) U/L  --   --   --  18   ALT (SGPT) U/L  --   --   --  15    < > = values in this interval not displayed.         Results from last 7 days   Lab Units 11/14/20  0532 11/13/20  0457   WBC 10*3/mm3 8.36 7.02   HEMOGLOBIN g/dL 10.9* 10.9*   HEMATOCRIT % 31.4* 31.0*   PLATELETS 10*3/mm3 252 255                    Invalid input(s): LDLCALC      Results from last 7 days   Lab Units 11/11/20  0342   TSH uIU/mL 1.540     I reviewed the patient's new clinical results.  I personally viewed and interpreted the patient's EKG  Current Medications:   Scheduled Meds:amLODIPine, 10 mg, Oral, Q24H  amoxicillin-clavulanate, 1 tablet, Oral, Q12H  aspirin, 81 mg, Oral, Daily  atorvastatin, 20 mg, Oral, Daily  divalproex, 250 mg, Oral, TID  insulin lispro, 0-7 Units, Subcutaneous, TID AC  melatonin, 5 mg, Oral, Nightly  metoprolol succinate XL, 25 mg, Oral, Daily  OXcarbazepine, 600 mg, Oral, Q12H  sodium chloride, 3 mL, Intravenous, Q12H  sodium chloride, 1 g, Oral, TID With Meals  tamsulosin, 0.4 mg, Oral, Nightly      Continuous Infusions:     Allergies:  Allergies   Allergen Reactions   • Clonidine Derivatives Unknown (See Comments)     .   • Hydrochlorothiazide Other (See Comments)     PT STATES CANT URINATE   • Metformin Unknown (See Comments)   • Sitagliptin Nausea And Vomiting       Assessment/Plan:     1. Symptomatic bradycardia.  Now status post pacemaker placement on 11/13/2020.  2. Hyponatremia. 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  Medication discontinued on 11/11.    3. UTI with enterococcus faecalis  4. Hypertension  5. Prior CVA  6. Diabetes mellitus type 2  7. Vascular Dementia    -So far pacemaker appears to be functioning normally.  -Management of hyponatremia per nephrology.    Chen Aguilar MD  11/14/20  11:26 EST

## 2020-11-14 NOTE — PLAN OF CARE
Goal Outcome Evaluation:  Plan of Care Reviewed With: patient  Progress: no change  Outcome Summary: VSS. PERCOCET FOR C/O BACK PAIN X 1 WITH ADEQUATE RELIEF, RESTED WELL IN BETWEEN CARE/TURNS.

## 2020-11-14 NOTE — PROGRESS NOTES
"   LOS: 6 days     Chief Complaint/ Reason for encounter: Hyponatremia   Chief Complaint   Patient presents with   • Lip Laceration   • Fall         Subjective   Nonverbal  Sleeping   NPO much of yesterday, diet resumed this morning     Medical history reviewed:  History of Present Illness    Subjective    History taken from: Patient and chart    Vital Signs  Temp:  [97.3 °F (36.3 °C)-99.1 °F (37.3 °C)] 97.7 °F (36.5 °C)  Heart Rate:  [43-68] 60  Resp:  [14-16] 16  BP: (133-157)/(72-96) 143/93       Wt Readings from Last 1 Encounters:   11/13/20 0550 58.2 kg (128 lb 4.8 oz)   11/09/20 0057 55.8 kg (123 lb)   11/08/20 1850 65.8 kg (145 lb)       Objective:  Vital signs: (most recent): Blood pressure 143/93, pulse 60, temperature 97.7 °F (36.5 °C), temperature source Oral, resp. rate 16, height 172.7 cm (67.99\"), weight 58.2 kg (128 lb 4.8 oz), SpO2 98 %.              Objective:  General Appearance:  Comfortable, malnourished and chronically ill-appearing, in no acute distress and not in pain.  Awake, alert, oriented  HEENT: Mucous membranes moist, no injury, oropharynx clear  Lungs:  Normal effort and normal respiratory rate.  Breath sounds clear to auscultation.  No  respiratory distress.  No rales, decreased breath sounds or rhonchi.    Heart: NSR now with Pacemaker.  Regular rhythm.  S1 normal.  No murmur.   Abdomen: Abdomen is soft.  Bowel sounds are normal, no abdominal tenderness.  There is no rebound or guarding  Extremities: Normal range of motion.  No edema of bilateral lower extremities, distal pulses intact  Neurological: No focal motor or sensory deficits, pupils reactive  Skin:  Warm and dry.  No rash or cyanosis.       Results Review:    Intake/Output:     Intake/Output Summary (Last 24 hours) at 11/14/2020 1119  Last data filed at 11/13/2020 1600  Gross per 24 hour   Intake 300 ml   Output --   Net 300 ml         DATA:  Radiology and Labs:  The following labs independently reviewed by me. Additional " labs ordered for tomorrow a.m.  Interval notes, chart personally reviewed by me.   Old records independently reviewed showing Na 135-137  The following radiologic studies independently viewed by me  New problems include bradycardia, persistent hyponatremia   Discussed with Dr. Allen     Risk/ complexity of medical care/ medical decision making high    Labs:   Recent Results (from the past 24 hour(s))   POC Glucose Once    Collection Time: 11/13/20  8:27 PM    Specimen: Blood   Result Value Ref Range    Glucose 160 (H) 70 - 130 mg/dL   Renal Function Panel    Collection Time: 11/14/20  5:32 AM    Specimen: Blood   Result Value Ref Range    Glucose 89 65 - 99 mg/dL    BUN 16 8 - 23 mg/dL    Creatinine 0.61 (L) 0.76 - 1.27 mg/dL    Sodium 127 (L) 136 - 145 mmol/L    Potassium 4.2 3.5 - 5.2 mmol/L    Chloride 94 (L) 98 - 107 mmol/L    CO2 27.1 22.0 - 29.0 mmol/L    Calcium 8.3 (L) 8.6 - 10.5 mg/dL    Albumin 3.20 (L) 3.50 - 5.20 g/dL    Phosphorus 3.9 2.5 - 4.5 mg/dL    Anion Gap 5.9 5.0 - 15.0 mmol/L    BUN/Creatinine Ratio 26.2 (H) 7.0 - 25.0    eGFR Non African Amer 133 >60 mL/min/1.73   CBC (No Diff)    Collection Time: 11/14/20  5:32 AM    Specimen: Blood   Result Value Ref Range    WBC 8.36 3.40 - 10.80 10*3/mm3    RBC 3.37 (L) 4.14 - 5.80 10*6/mm3    Hemoglobin 10.9 (L) 13.0 - 17.7 g/dL    Hematocrit 31.4 (L) 37.5 - 51.0 %    MCV 93.2 79.0 - 97.0 fL    MCH 32.3 26.6 - 33.0 pg    MCHC 34.7 31.5 - 35.7 g/dL    RDW 11.6 (L) 12.3 - 15.4 %    RDW-SD 39.1 37.0 - 54.0 fl    MPV 8.7 6.0 - 12.0 fL    Platelets 252 140 - 450 10*3/mm3   POC Glucose Once    Collection Time: 11/14/20  6:12 AM    Specimen: Blood   Result Value Ref Range    Glucose 94 70 - 130 mg/dL       Radiology:  Imaging Results (Last 24 Hours)     ** No results found for the last 24 hours. **             Medications have been reviewed:  Current Facility-Administered Medications   Medication Dose Route Frequency Provider Last Rate Last Dose   •  acetaminophen (TYLENOL) tablet 650 mg  650 mg Oral Q6H PRN Abhishek Zaldivar MD   650 mg at 11/11/20 2138   • amLODIPine (NORVASC) tablet 10 mg  10 mg Oral Q24H Abhishek Zaldivar MD   10 mg at 11/14/20 1027   • amoxicillin-clavulanate (AUGMENTIN) 875-125 MG per tablet 1 tablet  1 tablet Oral Q12H George Ware MD   1 tablet at 11/14/20 1027   • aspirin EC tablet 81 mg  81 mg Oral Daily Abhishek Zaldivar MD   81 mg at 11/14/20 1026   • atorvastatin (LIPITOR) tablet 20 mg  20 mg Oral Daily Abhishek Zaldivar MD   20 mg at 11/14/20 1027   • dextrose (D50W) 25 g/ 50mL Intravenous Solution 25 g  25 g Intravenous Q15 Min PRN Abhishek Zaldivar MD       • dextrose (GLUTOSE) oral gel 15 g  15 g Oral Q15 Min PRN Abhishek Zaldivar MD       • divalproex (DEPAKOTE) DR tablet 250 mg  250 mg Oral TID Abhishek Zaldivar MD   250 mg at 11/13/20 2055   • glucagon (human recombinant) (GLUCAGEN DIAGNOSTIC) injection 1 mg  1 mg Subcutaneous Q15 Min PRN Abhishek Zaldivar MD       • insulin lispro (humaLOG) injection 0-7 Units  0-7 Units Subcutaneous TID AC Abhishek Zaldivar MD       • melatonin tablet 5 mg  5 mg Oral Nightly Abhishek Zaldivar MD   5 mg at 11/13/20 2055   • metoprolol succinate XL (TOPROL-XL) 24 hr tablet 25 mg  25 mg Oral Daily Abhishek Zaldivar MD   25 mg at 11/14/20 1027   • OXcarbazepine (TRILEPTAL) tablet 600 mg  600 mg Oral Q12H Abhishek Zaldivar MD   600 mg at 11/14/20 1028   • oxyCODONE-acetaminophen (PERCOCET) 5-325 MG per tablet 1 tablet  1 tablet Oral Q4H PRN Abhishek Zaldivar MD   1 tablet at 11/13/20 2209   • sodium chloride 0.9 % flush 10 mL  10 mL Intravenous PRN Abhishek Zaldivar MD   10 mL at 11/10/20 2018   • sodium chloride 0.9 % flush 10 mL  10 mL Intravenous PRN Abhishek Zaldivar MD   10 mL at 11/13/20 0926   • sodium chloride 0.9 % flush 10 mL  10 mL Intravenous PRN Abhishek Zaldivar MD       • sodium chloride 0.9 % flush 3 mL  3 mL Intravenous Q12H  Abhishek Zaldivar MD   3 mL at 11/14/20 1026   • sodium chloride tablet 1 g  1 g Oral TID With Meals Abhishek Zaldivar MD   1 g at 11/14/20 1027   • tamsulosin (FLOMAX) 24 hr capsule 0.4 mg  0.4 mg Oral Nightly Abhishek Zaldivar MD   0.4 mg at 11/13/20 2055       ASSESSMENT:   Hyponatremia  UTI  HTN  DM  Swallowing impairment  Severe malnutrition   S/P pacemaker       PLAN:   Labs are consistent with SIADH secondary to medications, Paxil was discontinued 11/11/2020  Trileptal, lisinopril and Depakote could be exacerbating as well, but hyponatremia is mild  His baseline Na is 135-137, No change in Na today  However he has only received 2 doses due to NPO status, will follow Na   Recheck Na in am  Diet as tolerated      JOCELINE Bentley  [facility]   Office phone number: [phone]  Answering service phone number: [phone]    11/14/20  11:19 EST

## 2020-11-14 NOTE — PLAN OF CARE
Goal Outcome Evaluation:  Plan of Care Reviewed With: patient  Progress: improving  Outcome Summary: Pacemaker implantation today. Pt melinda well Site slightly edematous, but no bruising noted. Pt awake, talkative, but confused at times. Turned and respositioned. Soft boots to both feet. Safety maintained. Continue to monitor. Home soon.

## 2020-11-15 LAB
ALBUMIN SERPL-MCNC: 3.2 G/DL (ref 3.5–5.2)
ANION GAP SERPL CALCULATED.3IONS-SCNC: 8.1 MMOL/L (ref 5–15)
BUN SERPL-MCNC: 15 MG/DL (ref 8–23)
BUN/CREAT SERPL: 26.3 (ref 7–25)
CALCIUM SPEC-SCNC: 8.6 MG/DL (ref 8.6–10.5)
CHLORIDE SERPL-SCNC: 96 MMOL/L (ref 98–107)
CO2 SERPL-SCNC: 25.9 MMOL/L (ref 22–29)
CREAT SERPL-MCNC: 0.57 MG/DL (ref 0.76–1.27)
GFR SERPL CREATININE-BSD FRML MDRD: 144 ML/MIN/1.73
GLUCOSE BLDC GLUCOMTR-MCNC: 111 MG/DL (ref 70–130)
GLUCOSE BLDC GLUCOMTR-MCNC: 139 MG/DL (ref 70–130)
GLUCOSE BLDC GLUCOMTR-MCNC: 146 MG/DL (ref 70–130)
GLUCOSE BLDC GLUCOMTR-MCNC: 94 MG/DL (ref 70–130)
GLUCOSE SERPL-MCNC: 91 MG/DL (ref 65–99)
PHOSPHATE SERPL-MCNC: 3.7 MG/DL (ref 2.5–4.5)
POTASSIUM SERPL-SCNC: 4.3 MMOL/L (ref 3.5–5.2)
SODIUM SERPL-SCNC: 130 MMOL/L (ref 136–145)

## 2020-11-15 PROCEDURE — 82962 GLUCOSE BLOOD TEST: CPT

## 2020-11-15 PROCEDURE — 80069 RENAL FUNCTION PANEL: CPT | Performed by: INTERNAL MEDICINE

## 2020-11-15 PROCEDURE — 99024 POSTOP FOLLOW-UP VISIT: CPT | Performed by: INTERNAL MEDICINE

## 2020-11-15 RX ORDER — AMOXICILLIN 250 MG
2 CAPSULE ORAL NIGHTLY
Status: DISCONTINUED | OUTPATIENT
Start: 2020-11-15 | End: 2020-11-20 | Stop reason: HOSPADM

## 2020-11-15 RX ORDER — BISACODYL 10 MG
10 SUPPOSITORY, RECTAL RECTAL DAILY
Status: DISCONTINUED | OUTPATIENT
Start: 2020-11-15 | End: 2020-11-20 | Stop reason: HOSPADM

## 2020-11-15 RX ADMIN — AMOXICILLIN AND CLAVULANATE POTASSIUM 1 TABLET: 875; 125 TABLET, FILM COATED ORAL at 09:45

## 2020-11-15 RX ADMIN — DOCUSATE SODIUM 50MG AND SENNOSIDES 8.6MG 2 TABLET: 8.6; 5 TABLET, FILM COATED ORAL at 21:49

## 2020-11-15 RX ADMIN — AMLODIPINE BESYLATE 10 MG: 10 TABLET ORAL at 09:45

## 2020-11-15 RX ADMIN — OXCARBAZEPINE 600 MG: 300 TABLET, FILM COATED ORAL at 00:08

## 2020-11-15 RX ADMIN — TAMSULOSIN HYDROCHLORIDE 0.4 MG: 0.4 CAPSULE ORAL at 00:08

## 2020-11-15 RX ADMIN — TAMSULOSIN HYDROCHLORIDE 0.4 MG: 0.4 CAPSULE ORAL at 21:49

## 2020-11-15 RX ADMIN — Medication 5 MG: at 21:49

## 2020-11-15 RX ADMIN — METOPROLOL SUCCINATE 25 MG: 25 TABLET, EXTENDED RELEASE ORAL at 09:45

## 2020-11-15 RX ADMIN — ASPIRIN 81 MG: 81 TABLET, FILM COATED ORAL at 09:45

## 2020-11-15 RX ADMIN — SODIUM CHLORIDE, PRESERVATIVE FREE 3 ML: 5 INJECTION INTRAVENOUS at 00:09

## 2020-11-15 RX ADMIN — DIVALPROEX SODIUM 250 MG: 250 TABLET, DELAYED RELEASE ORAL at 21:49

## 2020-11-15 RX ADMIN — SODIUM CHLORIDE TAB 1 GM 1 G: 1 TAB at 18:08

## 2020-11-15 RX ADMIN — OXCARBAZEPINE 600 MG: 300 TABLET, FILM COATED ORAL at 21:50

## 2020-11-15 RX ADMIN — DIVALPROEX SODIUM 250 MG: 250 TABLET, DELAYED RELEASE ORAL at 18:08

## 2020-11-15 RX ADMIN — SODIUM CHLORIDE TAB 1 GM 1 G: 1 TAB at 09:45

## 2020-11-15 RX ADMIN — OXCARBAZEPINE 600 MG: 300 TABLET, FILM COATED ORAL at 09:46

## 2020-11-15 RX ADMIN — Medication 5 MG: at 00:09

## 2020-11-15 RX ADMIN — BISACODYL 10 MG: 10 SUPPOSITORY RECTAL at 21:49

## 2020-11-15 RX ADMIN — SODIUM CHLORIDE, PRESERVATIVE FREE 3 ML: 5 INJECTION INTRAVENOUS at 21:49

## 2020-11-15 RX ADMIN — DIVALPROEX SODIUM 250 MG: 250 TABLET, DELAYED RELEASE ORAL at 09:45

## 2020-11-15 RX ADMIN — ATORVASTATIN CALCIUM 20 MG: 20 TABLET, FILM COATED ORAL at 09:45

## 2020-11-15 RX ADMIN — AMOXICILLIN AND CLAVULANATE POTASSIUM 1 TABLET: 875; 125 TABLET, FILM COATED ORAL at 21:49

## 2020-11-15 RX ADMIN — AMOXICILLIN AND CLAVULANATE POTASSIUM 1 TABLET: 875; 125 TABLET, FILM COATED ORAL at 00:03

## 2020-11-15 RX ADMIN — SODIUM CHLORIDE, PRESERVATIVE FREE 3 ML: 5 INJECTION INTRAVENOUS at 09:44

## 2020-11-15 RX ADMIN — SODIUM CHLORIDE TAB 1 GM 1 G: 1 TAB at 12:58

## 2020-11-15 NOTE — PROGRESS NOTES
"   LOS: 7 days     Chief Complaint/ Reason for encounter: Hyponatremia   Chief Complaint   Patient presents with   • Lip Laceration   • Fall         Subjective   Nonverbal  Sleeping   NPO much of yesterday, diet resumed this morning     Medical history reviewed:  Lip Laceration    Fall        Subjective    History taken from: Patient and chart    Vital Signs  Temp:  [98 °F (36.7 °C)-98.5 °F (36.9 °C)] 98.1 °F (36.7 °C)  Heart Rate:  [60-61] 60  Resp:  [16-18] 18  BP: ()/(61-99) 144/82       Wt Readings from Last 1 Encounters:   11/13/20 0550 58.2 kg (128 lb 4.8 oz)   11/09/20 0057 55.8 kg (123 lb)   11/08/20 1850 65.8 kg (145 lb)       Objective:  Vital signs: (most recent): Blood pressure 144/82, pulse 60, temperature 98.1 °F (36.7 °C), temperature source Oral, resp. rate 18, height 172.7 cm (67.99\"), weight 58.2 kg (128 lb 4.8 oz), SpO2 97 %.              Objective:  General Appearance:  Comfortable, malnourished and chronically ill-appearing, in no acute distress and not in pain.  Awake, alert, oriented  HEENT: Mucous membranes moist, no injury, oropharynx clear  Lungs:  Normal effort and normal respiratory rate.  Breath sounds clear to auscultation.  No  respiratory distress.  No rales, decreased breath sounds or rhonchi.    Heart: NSR now with Pacemaker.  Regular rhythm.  S1 normal.  No murmur.   Abdomen: Abdomen is soft.  Bowel sounds are normal, no abdominal tenderness.  There is no rebound or guarding  Extremities: Normal range of motion.  No edema of bilateral lower extremities, distal pulses intact  Neurological: No focal motor or sensory deficits, pupils reactive  Skin:  Warm and dry.  No rash or cyanosis.       Results Review:    Intake/Output:     Intake/Output Summary (Last 24 hours) at 11/15/2020 1123  Last data filed at 11/15/2020 0945  Gross per 24 hour   Intake 230 ml   Output --   Net 230 ml         DATA:  Radiology and Labs:  The following labs independently reviewed by me. Additional labs " ordered for tomorrow a.m.  Interval notes, chart personally reviewed by me.   Old records independently reviewed showing Na 135-137  The following radiologic studies independently viewed by me  New problems include bradycardia, persistent hyponatremia   Discussed with Dr. Allen     Risk/ complexity of medical care/ medical decision making high    Labs:   Recent Results (from the past 24 hour(s))   POC Glucose Once    Collection Time: 11/14/20 11:36 AM    Specimen: Blood   Result Value Ref Range    Glucose 108 70 - 130 mg/dL   POC Glucose Once    Collection Time: 11/14/20  4:18 PM    Specimen: Blood   Result Value Ref Range    Glucose 105 70 - 130 mg/dL   POC Glucose Once    Collection Time: 11/14/20  8:11 PM    Specimen: Blood   Result Value Ref Range    Glucose 141 (H) 70 - 130 mg/dL   Renal Function Panel    Collection Time: 11/15/20  3:09 AM    Specimen: Blood   Result Value Ref Range    Glucose 91 65 - 99 mg/dL    BUN 15 8 - 23 mg/dL    Creatinine 0.57 (L) 0.76 - 1.27 mg/dL    Sodium 130 (L) 136 - 145 mmol/L    Potassium 4.3 3.5 - 5.2 mmol/L    Chloride 96 (L) 98 - 107 mmol/L    CO2 25.9 22.0 - 29.0 mmol/L    Calcium 8.6 8.6 - 10.5 mg/dL    Albumin 3.20 (L) 3.50 - 5.20 g/dL    Phosphorus 3.7 2.5 - 4.5 mg/dL    Anion Gap 8.1 5.0 - 15.0 mmol/L    BUN/Creatinine Ratio 26.3 (H) 7.0 - 25.0    eGFR Non African Amer 144 >60 mL/min/1.73   POC Glucose Once    Collection Time: 11/15/20  6:14 AM    Specimen: Blood   Result Value Ref Range    Glucose 94 70 - 130 mg/dL       Radiology:  Imaging Results (Last 24 Hours)     ** No results found for the last 24 hours. **             Medications have been reviewed:  Current Facility-Administered Medications   Medication Dose Route Frequency Provider Last Rate Last Dose   • acetaminophen (TYLENOL) tablet 650 mg  650 mg Oral Q6H PRN Abhishek Zaldivar MD   650 mg at 11/11/20 2138   • amLODIPine (NORVASC) tablet 10 mg  10 mg Oral Q24H Abhishek Zaldivar MD   10 mg at 11/15/20  0945   • amoxicillin-clavulanate (AUGMENTIN) 875-125 MG per tablet 1 tablet  1 tablet Oral Q12H George Ware MD   1 tablet at 11/15/20 0945   • aspirin EC tablet 81 mg  81 mg Oral Daily Abhishek Zaldivar MD   81 mg at 11/15/20 0945   • atorvastatin (LIPITOR) tablet 20 mg  20 mg Oral Daily Abhishek Zaldivar MD   20 mg at 11/15/20 0945   • dextrose (D50W) 25 g/ 50mL Intravenous Solution 25 g  25 g Intravenous Q15 Min PRN Abhishek Zaldivar MD       • dextrose (GLUTOSE) oral gel 15 g  15 g Oral Q15 Min PRN Abhishek Zaldivar MD       • divalproex (DEPAKOTE) DR tablet 250 mg  250 mg Oral TID Abhishek Zaldivar MD   250 mg at 11/15/20 0945   • glucagon (human recombinant) (GLUCAGEN DIAGNOSTIC) injection 1 mg  1 mg Subcutaneous Q15 Min PRN Abhishek Zaldivar MD       • insulin lispro (humaLOG) injection 0-7 Units  0-7 Units Subcutaneous TID AC Abhishek Zaldivar MD       • melatonin tablet 5 mg  5 mg Oral Nightly Abhishek Zaldivar MD   5 mg at 11/15/20 0009   • metoprolol succinate XL (TOPROL-XL) 24 hr tablet 25 mg  25 mg Oral Daily Abhishek Zaldivar MD   25 mg at 11/15/20 0945   • OXcarbazepine (TRILEPTAL) tablet 600 mg  600 mg Oral Q12H Abhishek Zaldivar MD   600 mg at 11/15/20 0946   • oxyCODONE-acetaminophen (PERCOCET) 5-325 MG per tablet 1 tablet  1 tablet Oral Q4H PRN Abhishek Zaldivar MD   1 tablet at 11/13/20 2209   • sodium chloride 0.9 % flush 10 mL  10 mL Intravenous PRN Abhishek Zaldivar MD   10 mL at 11/10/20 2018   • sodium chloride 0.9 % flush 10 mL  10 mL Intravenous PRN Abhishek Zaldivar MD   10 mL at 11/13/20 0926   • sodium chloride 0.9 % flush 10 mL  10 mL Intravenous PRN Abhishek Zaldivar MD       • sodium chloride 0.9 % flush 3 mL  3 mL Intravenous Q12H Abhishek Zaldivar MD   3 mL at 11/15/20 0944   • sodium chloride tablet 1 g  1 g Oral TID With Meals Abhishek Zaldivar MD   1 g at 11/15/20 0945   • tamsulosin (FLOMAX) 24 hr capsule 0.4 mg  0.4 mg Oral  Nightly Abhishek Zaldivar MD   0.4 mg at 11/15/20 0008       ASSESSMENT:   Hyponatremia  UTI  HTN  DM  Swallowing impairment  Severe malnutrition   S/P pacemaker       PLAN:   Labs are consistent with SIADH secondary to medications, Paxil was discontinued 11/11/2020  Trileptal, lisinopril and Depakote could be exacerbating as well, but hyponatremia is mild  His baseline Na is 135-137, Na much improved today  Encourage PO   Recheck Na in am  Diet as tolerated      JOCELINE Bentley  [facility]   Office phone number: [phone]  Answering service phone number: [phone]    11/15/20  11:23 EST

## 2020-11-15 NOTE — PROGRESS NOTES
"DAILY PROGRESS NOTE  [facility]    Patient Identification:  Name: Trever Petit  Age: 64 y.o.  Sex: male  :  1955  MRN: [id number]         Primary Care Physician: George Ware MD    Subjective: patient is sitting up; drowsy; slow to answer questions; mostly nonverbal  Interval History: follow up for hyponatremia, pacemaker placement, uti, hypertension, diabetes, dementia    Objective:    Scheduled Meds:amLODIPine, 10 mg, Oral, Q24H  amoxicillin-clavulanate, 1 tablet, Oral, Q12H  aspirin, 81 mg, Oral, Daily  atorvastatin, 20 mg, Oral, Daily  divalproex, 250 mg, Oral, TID  insulin lispro, 0-7 Units, Subcutaneous, TID AC  melatonin, 5 mg, Oral, Nightly  metoprolol succinate XL, 25 mg, Oral, Daily  OXcarbazepine, 600 mg, Oral, Q12H  sodium chloride, 3 mL, Intravenous, Q12H  sodium chloride, 1 g, Oral, TID With Meals  tamsulosin, 0.4 mg, Oral, Nightly      Continuous Infusions:     Vital signs in last 24 hours:  Temp:  [97.5 °F (36.4 °C)-98.5 °F (36.9 °C)] 98 °F (36.7 °C)  Heart Rate:  [60-68] 61  Resp:  [16] 16  BP: ()/(61-96) 153/94    Intake/Output:    Intake/Output Summary (Last 24 hours) at 2020  Last data filed at 2020 1850  Gross per 24 hour   Intake 170 ml   Output --   Net 170 ml       Exam:  /94 (BP Location: Right arm, Patient Position: Lying)   Pulse 61   Temp 98 °F (36.7 °C) (Oral)   Resp 16   Ht 172.7 cm (67.99\")   Wt 58.2 kg (128 lb 4.8 oz) Comment: wihtout pumps on bottom of bed  SpO2 96%   BMI 19.51 kg/m²     General Appearance:    drowsy, cooperative, no distress, Ox1                          Head:    Normocephalic, without obvious abnormality, atraumatic                           Eyes:    PERRL, conjunctivae/corneas clear, EOM's intact, both eyes                         Throat:   Lips, tongue, gums normal; oral mucosa pink and moist                           Neck:   Supple, symmetrical, trachea midline, no JVD                         " Lungs:    Decreased breath sounds bilaterally, respirations unlabored                 Chest Wall:    No tenderness or deformity                          Heart:    Regular rate and rhythm, S1 and S2 normal, no murmur,no  rub or gallop                  Abdomen:     Soft, non-tender, bowel sounds active, no masses, no organomegaly                  Extremities:   Extremities normal, atraumatic, no cyanosis or edema                        Pulses:   Pulses palpable in all extremities                            Skin:   Skin is warm and dry,  no rashes or palpable lesions                  Neurologic:   CNII-XII intact, motor strength grossly intact, sensation grossly intact to light touch, no focal deficits noted       Data Review:  Labs in chart were reviewed.  WBC   Date Value Ref Range Status   11/14/2020 8.36 3.40 - 10.80 10*3/mm3 Final     Hemoglobin   Date Value Ref Range Status   11/14/2020 10.9 (L) 13.0 - 17.7 g/dL Final     Hematocrit   Date Value Ref Range Status   11/14/2020 31.4 (L) 37.5 - 51.0 % Final     Platelets   Date Value Ref Range Status   11/14/2020 252 140 - 450 10*3/mm3 Final     Sodium   Date Value Ref Range Status   11/14/2020 127 (L) 136 - 145 mmol/L Final     Potassium   Date Value Ref Range Status   11/14/2020 4.2 3.5 - 5.2 mmol/L Final     Chloride   Date Value Ref Range Status   11/14/2020 94 (L) 98 - 107 mmol/L Final     CO2   Date Value Ref Range Status   11/14/2020 27.1 22.0 - 29.0 mmol/L Final     BUN   Date Value Ref Range Status   11/14/2020 16 8 - 23 mg/dL Final     Creatinine   Date Value Ref Range Status   11/14/2020 0.61 (L) 0.76 - 1.27 mg/dL Final     Glucose   Date Value Ref Range Status   11/14/2020 89 65 - 99 mg/dL Final     Calcium   Date Value Ref Range Status   11/14/2020 8.3 (L) 8.6 - 10.5 mg/dL Final     Phosphorus   Date Value Ref Range Status   11/14/2020 3.9 2.5 - 4.5 mg/dL Final     No results found for: AST, ALT, ALKPHOS  No results found for: APTT, INR  Patient Active  Problem List   Diagnosis Code   • Hypertensive emergency I16.1   • Essential hypertension I10   • Stroke (CMS/HCC) I63.9   • Hypoglycemia E16.2   • Vascular dementia (CMS/Former[facility]) F01.50   • Type 2 diabetes mellitus, without long-term current use of insulin (CMS/[facility]) E11.9   • Constipation K59.00   • Leukocytosis D72.829   • Fever R50.9   • Closed fracture of multiple ribs of left side S22.42XA   • Pleural effusion J90   • Left lower lobe pneumonia J18.9   • Cystitis N30.90   • Seizure-like activity (CMS/[facility]) R56.9   • E. coli UTI (urinary tract infection) N39.0, B96.20   • Fall W19.XXXA   • C1 cervical fracture (CMS/[facility]) S12.000A   • Bradycardia R00.1   • Malignant hypertensive urgency I16.0   • Severe malnutrition (CMS/[facility]) E43   • Dysphagia R13.10       Assessment:    Malignant hypertensive urgency    Severe malnutrition (CMS/[facility])    Dysphagia  dementia  S.p pacemaker placement  Hypertension  diabetes    Plan:  Sodium is still low  Heart rate is better after pacemaker placement  Mental status may be close to baseline  Appreciate input from all  Notes and labs reviewed  Medium risk    Susy Chiu MD  11/14/2020  20:20 EST

## 2020-11-15 NOTE — PROGRESS NOTES
[facility] Follow Up    Chief Complaint: Follow up bradycardia status post pacemaker placement    Interval History: No acute issues.    Objective:     Objective:  Temp:  [98 °F (36.7 °C)-98.5 °F (36.9 °C)] 98.1 °F (36.7 °C)  Heart Rate:  [60-61] 60  Resp:  [16-18] 18  BP: ()/(61-99) 144/82     Intake/Output Summary (Last 24 hours) at 11/15/2020 0832  Last data filed at 11/14/2020 1850  Gross per 24 hour   Intake 170 ml   Output --   Net 170 ml     Body mass index is 19.51 kg/m².      11/08/20  1850 11/09/20  0057 11/13/20  0550   Weight: 65.8 kg (145 lb) 55.8 kg (123 lb) 58.2 kg (128 lb 4.8 oz) (wihtout pumps on bottom of bed)     Weight change:       Physical Exam:   General : Alert, cooperative, in no acute distress.  Neuro: Alert,cooperative and oriented.  Lungs: CTAB. Normal respiratory effort and rate.  CV: Regular rate and rhythm, normal S1 and S2, no murmurs, gallops or rubs.  ABD: Soft, nontender, nondistended. Positive bowel sounds.  Extr: No edema or cyanosis, moves all extremities.  Chest: Dressing over pacemaker site removed.  Incision appears to be clean dry and intact with no bleeding or drainage.  Mild swelling over pacemaker site appears stable    Lab Review:   Results from last 7 days   Lab Units 11/15/20  0309 11/14/20  0532  11/08/20  1849   SODIUM mmol/L 130* 127*   < > 131*   POTASSIUM mmol/L 4.3 4.2   < > 5.1   CHLORIDE mmol/L 96* 94*   < > 93*   CO2 mmol/L 25.9 27.1   < > 32.1*   BUN mg/dL 15 16   < > 20   CREATININE mg/dL 0.57* 0.61*   < > 0.82   GLUCOSE mg/dL 91 89   < > 95   CALCIUM mg/dL 8.6 8.3*   < > 8.6   AST (SGOT) U/L  --   --   --  18   ALT (SGPT) U/L  --   --   --  15    < > = values in this interval not displayed.         Results from last 7 days   Lab Units 11/14/20  0532 11/13/20  0457   WBC 10*3/mm3 8.36 7.02   HEMOGLOBIN g/dL 10.9* 10.9*   HEMATOCRIT % 31.4* 31.0*   PLATELETS 10*3/mm3 252 255                   Invalid input(s): LDLCALC      Results  from last 7 days   Lab Units 11/11/20  0342   TSH uIU/mL 1.540     I reviewed the patient's new clinical results.  I personally viewed and interpreted the patient's EKG  Current Medications:   Scheduled Meds:amLODIPine, 10 mg, Oral, Q24H  amoxicillin-clavulanate, 1 tablet, Oral, Q12H  aspirin, 81 mg, Oral, Daily  atorvastatin, 20 mg, Oral, Daily  divalproex, 250 mg, Oral, TID  insulin lispro, 0-7 Units, Subcutaneous, TID AC  melatonin, 5 mg, Oral, Nightly  metoprolol succinate XL, 25 mg, Oral, Daily  OXcarbazepine, 600 mg, Oral, Q12H  sodium chloride, 3 mL, Intravenous, Q12H  sodium chloride, 1 g, Oral, TID With Meals  tamsulosin, 0.4 mg, Oral, Nightly      Continuous Infusions:     Allergies:  Allergies   Allergen Reactions   • Clonidine Derivatives Unknown (See Comments)     .   • Hydrochlorothiazide Other (See Comments)     PT STATES CANT URINATE   • Metformin Unknown (See Comments)   • Sitagliptin Nausea And Vomiting       Assessment/Plan:     1. Symptomatic bradycardia.  Now status post pacemaker placement on 11/13/2020.  2. Hyponatremia. 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  Medication discontinued on 11/11.    Improving slowly.  3. UTI with enterococcus faecalis  4. Hypertension  5. Prior CVA  6. Diabetes mellitus type 2  7. Vascular Dementia    -We will sign off for now.  He will need a pacemaker site check 7-10  days after discharge.    Chen Aguilar MD  11/15/20  08:32 EST

## 2020-11-15 NOTE — PROGRESS NOTES
"DAILY PROGRESS NOTE  [facility]    Patient Identification:  Name: Trever Petit  Age: 64 y.o.  Sex: male  :  1955  MRN: [id number]         Primary Care Physician: George Ware MD    Subjective: patient is more alert today; remains essentially nonverbal; had some vomiting today  Interval History: follow up for hyponatremia, pacemaker placement, uti, dementia, diabetes, malnutrition    Objective:    Scheduled Meds:amLODIPine, 10 mg, Oral, Q24H  amoxicillin-clavulanate, 1 tablet, Oral, Q12H  aspirin, 81 mg, Oral, Daily  atorvastatin, 20 mg, Oral, Daily  divalproex, 250 mg, Oral, TID  insulin lispro, 0-7 Units, Subcutaneous, TID AC  melatonin, 5 mg, Oral, Nightly  metoprolol succinate XL, 25 mg, Oral, Daily  OXcarbazepine, 600 mg, Oral, Q12H  sodium chloride, 3 mL, Intravenous, Q12H  sodium chloride, 1 g, Oral, TID With Meals  tamsulosin, 0.4 mg, Oral, Nightly      Continuous Infusions:     Vital signs in last 24 hours:  Temp:  [98 °F (36.7 °C)-98.1 °F (36.7 °C)] 98.1 °F (36.7 °C)  Heart Rate:  [60-61] 61  Resp:  [16-18] 18  BP: (128-165)/(74-99) 128/74    Intake/Output:    Intake/Output Summary (Last 24 hours) at 11/15/2020 1858  Last data filed at 11/15/2020 1314  Gross per 24 hour   Intake 120 ml   Output --   Net 120 ml       Exam:  /74 (BP Location: Left arm, Patient Position: Lying)   Pulse 61   Temp 98.1 °F (36.7 °C) (Oral)   Resp 18   Ht 172.7 cm (67.99\")   Wt 58.2 kg (128 lb 4.8 oz) Comment: wihtout pumps on bottom of bed  SpO2 95%   BMI 19.51 kg/m²     General Appearance:    Alert, cooperative, no distress, AAOx3                          Head:    Normocephalic, without obvious abnormality, atraumatic                           Eyes:    PERRL, conjunctivae/corneas clear, EOM's intact, both eyes                         Throat:   Lips, tongue, gums normal; oral mucosa pink and moist                           Neck:   Supple, symmetrical, trachea midline, no JVD           "               Lungs:    Decreased breath sounds bilaterally, respirations unlabored                 Chest Wall:    No tenderness or deformity                          Heart:    Regular rate and rhythm, S1 and S2 normal, no murmur,no  rub or gallop                  Abdomen:     Soft, non-tender, bowel sounds active, no masses, no organomegaly                  Extremities:   Extremities normal, atraumatic, no cyanosis or edema                        Pulses:   Pulses palpable in all extremities                            Skin:   Skin is warm and dry,  no rashes or palpable lesions                  Neurologic:   CNII-XII intact, motor strength grossly intact, sensation grossly intact to light touch, no focal deficits noted       Data Review:  Labs in chart were reviewed.  WBC   Date Value Ref Range Status   11/14/2020 8.36 3.40 - 10.80 10*3/mm3 Final     Hemoglobin   Date Value Ref Range Status   11/14/2020 10.9 (L) 13.0 - 17.7 g/dL Final     Hematocrit   Date Value Ref Range Status   11/14/2020 31.4 (L) 37.5 - 51.0 % Final     Platelets   Date Value Ref Range Status   11/14/2020 252 140 - 450 10*3/mm3 Final     Sodium   Date Value Ref Range Status   11/15/2020 130 (L) 136 - 145 mmol/L Final     Potassium   Date Value Ref Range Status   11/15/2020 4.3 3.5 - 5.2 mmol/L Final     Chloride   Date Value Ref Range Status   11/15/2020 96 (L) 98 - 107 mmol/L Final     CO2   Date Value Ref Range Status   11/15/2020 25.9 22.0 - 29.0 mmol/L Final     BUN   Date Value Ref Range Status   11/15/2020 15 8 - 23 mg/dL Final     Creatinine   Date Value Ref Range Status   11/15/2020 0.57 (L) 0.76 - 1.27 mg/dL Final     Glucose   Date Value Ref Range Status   11/15/2020 91 65 - 99 mg/dL Final     Calcium   Date Value Ref Range Status   11/15/2020 8.6 8.6 - 10.5 mg/dL Final     Phosphorus   Date Value Ref Range Status   11/15/2020 3.7 2.5 - 4.5 mg/dL Final     No results found for: AST, ALT, ALKPHOS  Patient Active Problem List   Diagnosis  Code   • Hypertensive emergency I16.1   • Essential hypertension I10   • Stroke (CMS/[facility]) I63.9   • Hypoglycemia E16.2   • Vascular dementia (CMS/[facility]) F01.50   • Type 2 diabetes mellitus, without long-term current use of insulin (CMS/[facility]) E11.9   • Constipation K59.00   • Leukocytosis D72.829   • Fever R50.9   • Closed fracture of multiple ribs of left side S22.42XA   • Pleural effusion J90   • Left lower lobe pneumonia J18.9   • Cystitis N30.90   • Seizure-like activity (CMS/[facility]) R56.9   • E. coli UTI (urinary tract infection) N39.0, B96.20   • Fall W19.XXXA   • C1 cervical fracture (CMS/[facility]) S12.000A   • Bradycardia R00.1   • Malignant hypertensive urgency I16.0   • Severe malnutrition (CMS/[facility]) E43   • Dysphagia R13.10       Assessment:    Malignant hypertensive urgency    Severe malnutrition (CMS/[facility])    Dysphagia  uti  S/p pacemaker placement  uti  Hyponatremia    Plan:  Continue antibiotics  Mental status is likely close to baseline  Appreciate help from all  Heart rate stable after pacemaker  Sodium is slowly rising  Medium risk  Susy Chiu MD  11/15/2020  18:58 EST

## 2020-11-15 NOTE — PLAN OF CARE
Goal Outcome Evaluation:  Plan of Care Reviewed With: patient  Progress: no change  Outcome Summary: VSS. Pt denies pain. Dressing from pacemaker placement dry and intact, no drainage. Meds given except depakote, pt was attempting to chew his pills and could not crush it per pharmacy instruction--may due better in a.m. with less confusion than at night. Saftey maintained.

## 2020-11-16 LAB
ALBUMIN SERPL-MCNC: 3.4 G/DL (ref 3.5–5.2)
ANION GAP SERPL CALCULATED.3IONS-SCNC: 9.3 MMOL/L (ref 5–15)
BUN SERPL-MCNC: 19 MG/DL (ref 8–23)
BUN/CREAT SERPL: 32.2 (ref 7–25)
CALCIUM SPEC-SCNC: 9.1 MG/DL (ref 8.6–10.5)
CHLORIDE SERPL-SCNC: 97 MMOL/L (ref 98–107)
CO2 SERPL-SCNC: 26.7 MMOL/L (ref 22–29)
CREAT SERPL-MCNC: 0.59 MG/DL (ref 0.76–1.27)
GFR SERPL CREATININE-BSD FRML MDRD: 138 ML/MIN/1.73
GLUCOSE BLDC GLUCOMTR-MCNC: 100 MG/DL (ref 70–130)
GLUCOSE BLDC GLUCOMTR-MCNC: 115 MG/DL (ref 70–130)
GLUCOSE BLDC GLUCOMTR-MCNC: 121 MG/DL (ref 70–130)
GLUCOSE BLDC GLUCOMTR-MCNC: 122 MG/DL (ref 70–130)
GLUCOSE BLDC GLUCOMTR-MCNC: 129 MG/DL (ref 70–130)
GLUCOSE SERPL-MCNC: 92 MG/DL (ref 65–99)
PHOSPHATE SERPL-MCNC: 3.8 MG/DL (ref 2.5–4.5)
POTASSIUM SERPL-SCNC: 4.2 MMOL/L (ref 3.5–5.2)
SODIUM SERPL-SCNC: 133 MMOL/L (ref 136–145)

## 2020-11-16 PROCEDURE — 92526 ORAL FUNCTION THERAPY: CPT

## 2020-11-16 PROCEDURE — 80069 RENAL FUNCTION PANEL: CPT | Performed by: INTERNAL MEDICINE

## 2020-11-16 PROCEDURE — 82962 GLUCOSE BLOOD TEST: CPT

## 2020-11-16 RX ADMIN — SODIUM CHLORIDE TAB 1 GM 1 G: 1 TAB at 15:01

## 2020-11-16 RX ADMIN — DIVALPROEX SODIUM 250 MG: 250 TABLET, DELAYED RELEASE ORAL at 16:16

## 2020-11-16 RX ADMIN — SODIUM CHLORIDE TAB 1 GM 1 G: 1 TAB at 09:33

## 2020-11-16 RX ADMIN — Medication 5 MG: at 20:25

## 2020-11-16 RX ADMIN — SODIUM CHLORIDE, PRESERVATIVE FREE 3 ML: 5 INJECTION INTRAVENOUS at 10:45

## 2020-11-16 RX ADMIN — ATORVASTATIN CALCIUM 20 MG: 20 TABLET, FILM COATED ORAL at 09:33

## 2020-11-16 RX ADMIN — AMOXICILLIN AND CLAVULANATE POTASSIUM 1 TABLET: 875; 125 TABLET, FILM COATED ORAL at 09:33

## 2020-11-16 RX ADMIN — DIVALPROEX SODIUM 250 MG: 250 TABLET, DELAYED RELEASE ORAL at 09:33

## 2020-11-16 RX ADMIN — OXCARBAZEPINE 600 MG: 300 TABLET, FILM COATED ORAL at 20:25

## 2020-11-16 RX ADMIN — DIVALPROEX SODIUM 250 MG: 250 TABLET, DELAYED RELEASE ORAL at 20:25

## 2020-11-16 RX ADMIN — DOCUSATE SODIUM 50MG AND SENNOSIDES 8.6MG 2 TABLET: 8.6; 5 TABLET, FILM COATED ORAL at 20:25

## 2020-11-16 RX ADMIN — AMLODIPINE BESYLATE 10 MG: 10 TABLET ORAL at 09:33

## 2020-11-16 RX ADMIN — SODIUM CHLORIDE, PRESERVATIVE FREE 3 ML: 5 INJECTION INTRAVENOUS at 20:26

## 2020-11-16 RX ADMIN — ASPIRIN 81 MG: 81 TABLET, FILM COATED ORAL at 09:33

## 2020-11-16 RX ADMIN — OXCARBAZEPINE 600 MG: 300 TABLET, FILM COATED ORAL at 09:32

## 2020-11-16 RX ADMIN — TAMSULOSIN HYDROCHLORIDE 0.4 MG: 0.4 CAPSULE ORAL at 20:25

## 2020-11-16 RX ADMIN — METOPROLOL SUCCINATE 25 MG: 25 TABLET, EXTENDED RELEASE ORAL at 09:33

## 2020-11-16 RX ADMIN — BISACODYL 10 MG: 10 SUPPOSITORY RECTAL at 09:33

## 2020-11-16 RX ADMIN — OXYCODONE HYDROCHLORIDE AND ACETAMINOPHEN 1 TABLET: 5; 325 TABLET ORAL at 20:25

## 2020-11-16 NOTE — PLAN OF CARE
Goal Outcome Evaluation:  Plan of Care Reviewed With: patient  Progress: no change  Outcome Summary: VSS. OPENS EYES BUT REMAINS NONVERBAL. FOLLOWS SIMPLE COMMANDS. STILL NO BM.  SLEPT IN BETWEEN CARE/TURNS.

## 2020-11-16 NOTE — PROGRESS NOTES
DAILY PROGRESS NOTE  [facility], [facility]    2020    Patient Identification:  Name: Trever Petit  Age: 64 y.o.  Sex: male  :  1955  MRN: [id number]           Primary Care Physician: George Ware MD    Subjective:    Interval History:    No changes from yesterday.  Pacemaker working okay  Vital signs stable, saturation room air 97%.  Blood sugar okay  Sodium 133, potassium 4.2, creatinine 0.59.  Status post treatment for Enterococcus faecalis UTI.    ROS:     There is no report for nausea, vomiting, diarrhea, constipation, chest pain, shortness of breath.    Objective:    Scheduled Meds:  amLODIPine, 10 mg, Oral, Q24H  amoxicillin-clavulanate, 1 tablet, Oral, Q12H  aspirin, 81 mg, Oral, Daily  atorvastatin, 20 mg, Oral, Daily  bisacodyl, 10 mg, Rectal, Daily  divalproex, 250 mg, Oral, TID  insulin lispro, 0-7 Units, Subcutaneous, TID AC  melatonin, 5 mg, Oral, Nightly  metoprolol succinate XL, 25 mg, Oral, Daily  OXcarbazepine, 600 mg, Oral, Q12H  senna-docusate sodium, 2 tablet, Oral, Nightly  sodium chloride, 3 mL, Intravenous, Q12H  sodium chloride, 1 g, Oral, TID With Meals  tamsulosin, 0.4 mg, Oral, Nightly        Continuous Infusions:       PRN Meds:  •  acetaminophen  •  dextrose  •  dextrose  •  glucagon (human recombinant)  •  oxyCODONE-acetaminophen  •  sodium chloride  •  [COMPLETED] Insert peripheral IV **AND** sodium chloride  •  sodium chloride    Intake/Output:    Intake/Output Summary (Last 24 hours) at 2020 0824  Last data filed at 11/15/2020 1820  Gross per 24 hour   Intake 150 ml   Output --   Net 150 ml         Exam:    tMax 24 hrs: Temp (24hrs), Av.1 °F (36.7 °C), Min:98.1 °F (36.7 °C), Max:98.1 °F (36.7 °C)    Vitals Ranges:   Temp:  [98.1 °F (36.7 °C)] 98.1 °F (36.7 °C)  Heart Rate:  [60-61] 60  Resp:  [16-18] 16  BP: (128-130)/(74-82) 130/82    /82 (BP Location: Left arm, Patient Position: Lying)   Pulse 60    "Temp 98.1 °F (36.7 °C) (Oral)   Resp 16   Ht 172.7 cm (67.99\")   Wt 58.2 kg (128 lb 4.8 oz) Comment: wihtout pumps on bottom of bed  SpO2 99%   BMI 19.51 kg/m²     General: Patient is awake, Oriented x 1-2. No respiratory distress  Oropharynx: Contusion and abrasion of the left aspect of patient's chin.  Poor dentition.  Laceration intraorally at the frenulum, healing.  Contusion of left sided lip.  Neck: Supple, symmetrical, trachea midline, no adenopathy;              thyroid:  no enlargement/tenderness/nodules;              no carotid bruit or JVD  Cardiovascular: Normal rate, regular rhythm and intact distal pulses.              Exam reveals no gallop and no friction rub. No murmur heard  Pulmonary: Clear to auscultation bilaterally, respirations unlabored.               No rhonchi, wheezing or rales.   Abdominal: Soft, nontender, bowel sounds active all four quadrants,     no masses, no hepatomegaly, no splenomegaly.  Cuellar in place  Extremities: Normal, atraumatic, able to move upper extremity, no much movement of the lower extremity.  Pulses: 2 + symmetric all extremities  Neurological: Patient is awake, responsive to questioning, oriented x1-2.  No new focal sensorimotor deficit.    Skin: Skin color, texture, normal. Turgor is decreased. No rashes or lesions       Data Review:    Results from last 7 days   Lab Units 11/14/20  0532 11/13/20  0457 11/12/20  0547   WBC 10*3/mm3 8.36 7.02 6.65   HEMOGLOBIN g/dL 10.9* 10.9* 10.4*   HEMATOCRIT % 31.4* 31.0* 30.5*   PLATELETS 10*3/mm3 252 255 221       Results from last 7 days   Lab Units 11/16/20  0508 11/15/20  0309 11/14/20  0532   SODIUM mmol/L 133* 130* 127*   POTASSIUM mmol/L 4.2 4.3 4.2   CHLORIDE mmol/L 97* 96* 94*   CO2 mmol/L 26.7 25.9 27.1   BUN mg/dL 19 15 16   CREATININE mg/dL 0.59* 0.57* 0.61*   CALCIUM mg/dL 9.1 8.6 8.3*   GLUCOSE mg/dL 92 91 89                 Lab Results   Lab Value Date/Time    TROPONINT 0.019 05/23/2020 0921    TROPONINT " <0.010 08/30/2019 0443    TROPONINT <0.010 08/28/2019 1308    TROPONINT <0.010 08/19/2019 1727    TROPONINT <0.010 08/19/2019 1538    TROPONINT <0.010 06/25/2019 1703    TROPONINT <0.010 06/25/2019 1502    TROPONINT <0.010 06/22/2019 0028    TROPONINT <0.010 06/21/2019 1846       Microbiology Results (last 10 days)     Procedure Component Value - Date/Time    Urine Culture - Urine, Urine, Catheter In/Out [[id number]]  (Abnormal)  (Susceptibility) Collected: 11/09/20 2102    Lab Status: Final result Specimen: Urine, Catheter In/Out Updated: 11/12/20 0142     Urine Culture >100,000 CFU/mL Enterococcus faecalis    Susceptibility      Enterococcus faecalis     ALLISON     Ampicillin Susceptible     Levofloxacin Susceptible     Nitrofurantoin Susceptible     Tetracycline Resistant     Vancomycin Susceptible                    COVID PRE-OP / PRE-PROCEDURE SCREENING ORDER (NO ISOLATION) - Swab, Nasopharynx [[id number]]  (Normal) Collected: 11/08/20 2333    Lab Status: Final result Specimen: Swab from Nasopharynx Updated: 11/09/20 0053    Narrative:      The following orders were created for panel order COVID PRE-OP / PRE-PROCEDURE SCREENING ORDER (NO ISOLATION) - Swab, Nasopharynx.  Procedure                               Abnormality         Status                     ---------                               -----------         ------                     Respiratory Panel PCR w/...[[id number]]  Normal              Final result                 Please view results for these tests on the individual orders.    Respiratory Panel PCR w/COVID-19(SARS-CoV-2) [facility] In-House, NP Swab in UTM/VTM, 3-4 HR TAT - Swab, Nasopharynx [[id number]]  (Normal) Collected: 11/08/20 2333    Lab Status: Final result Specimen: Swab from Nasopharynx Updated: 11/09/20 0053     ADENOVIRUS, PCR Not Detected     Coronavirus 229E Not Detected     Coronavirus HKU1 Not Detected     Coronavirus NL63 Not Detected     Coronavirus OC43 Not Detected      COVID19 Not Detected     Human Metapneumovirus Not Detected     Human Rhinovirus/Enterovirus Not Detected     Influenza A PCR Not Detected     Influenza B PCR Not Detected     Parainfluenza Virus 1 Not Detected     Parainfluenza Virus 2 Not Detected     Parainfluenza Virus 3 Not Detected     Parainfluenza Virus 4 Not Detected     RSV, PCR Not Detected     Bordetella pertussis pcr Not Detected     Bordetella parapertussis PCR Not Detected     Chlamydophila pneumoniae PCR Not Detected     Mycoplasma pneumo by PCR Not Detected    Narrative:      Fact sheet for providers: https://docs.[employer]/wp-content/uploads/GOK7199-5928-AA5.1-EUA-Provider-Fact-Sheet-3.pdf    Fact sheet for patients: https://docs.[employer]/wp-content/uploads/PSL2431-0981-UG5.1-EUA-Patient-Fact-Sheet-1.pdf           Imaging Results (Last 7 Days)     ** No results found for the last 168 hours. **            Assessment:        Malignant hypertensive urgency    Severe malnutrition (CMS/HCC)    Dysphagia  Urinary tract infection per Enterococcus faecalis  Dehydration  Chronic worsening  bradycardia  Face contusion  Probably concussion  Vascular dementia with visual disturbance  COPD  Diabetes mellitus  Hypertension  Late effects of CVA  Persistent hyponatremia  SIADH    Plan:    Pacemaker working okay  Has completed antibiotic for Enterococcus faecalis UTI  Sodium improving  Adjust insulin as needed  DVT/stress ulcer prophylaxis  PT to work with patient  Speech therapy to follow patient  Back to nursing home tomorrow if continues to improve.  Labs in       George Ware MD  11/16/2020

## 2020-11-16 NOTE — THERAPY TREATMENT NOTE
Acute Care - IRF Speech Language Pathology   Swallow Treatment Note/Discharge   [facility]     Patient Name: Trever Petit  : 1955  MRN: [id number]  Today's Date: 2020               Admit Date: 2020    Visit Dx:      ICD-10-CM ICD-9-CM   1. Malignant hypertensive urgency  I16.0 401.0   2. Bradycardia chronic  R00.1 427.89   3. Contusion of face, initial encounter  S00.83XA 920   4. Dementia with behavioral disturbance, unspecified dementia type (CMS/HCC)  F03.91 294.21     Patient Active Problem List   Diagnosis   • Hypertensive emergency   • Essential hypertension   • Stroke (CMS/HCC)   • Hypoglycemia   • Vascular dementia (CMS/HCC)   • Type 2 diabetes mellitus, without long-term current use of insulin (CMS/HCC)   • Constipation   • Leukocytosis   • Fever   • Closed fracture of multiple ribs of left side   • Pleural effusion   • Left lower lobe pneumonia   • Cystitis   • Seizure-like activity (CMS/HCC)   • E. coli UTI (urinary tract infection)   • Fall   • C1 cervical fracture (CMS/HCC)   • Bradycardia   • Malignant hypertensive urgency   • Severe malnutrition (CMS/HCC)   • Dysphagia       Therapy Treatment    Patient was not wearing a face mask during this therapy encounter. Therapist used appropriate personal protective equipment including mask, eye protection and gloves.  Mask used was N95/duckbill. Appropriate PPE was worn during the entire therapy session. Hand hygiene was completed before and after therapy session. Patient is not in enhanced droplet precautions.         SLP GOALS     Row Name 20 1100             Oral Nutrition/Hydration Goal 1 (SLP)    Oral Nutrition/Hydration Goal 1, SLP  Tolerate least restrictive diet  -SH      Time Frame (Oral Nutrition/Hydration Goal 1, SLP)  by discharge  -SH      Barriers (Oral Nutrition/Hydration Goal 1, SLP)  Progress: Patient seen for re evaluation of swallow function. Pt would not interact with SLP this date despite tactile and  verbal cues. Pt resting with his eyes minimally open. Nursing assistant reported feeding patient in this state this AM. RN reported episode of delayed coughing with meds in puree after being laid HOB flat. Nursing assistant also reported patient was more interactive in her prior shifts. Pt with poor coordination with accepting liquid bolus this date. Pt would not close his mouth around the cup to take a drink and often blew into the straw instead of sucking. Anterior loss thins via cup. Occasional cough with thins via straw. No overt s/s of aspiration with nectar via cup or puree. Prolonged mastication with mech soft, no oral residue post swallow. Of note, patient did close his mouth around spoon and straw this date. SLP recs downgrade to nectar, continue mech soft. Continue meds in puree. Allow free water protocol with follow for diet tolerance and possible advancement.  -      Progress/Outcomes (Oral Nutrition/Hydration Goal 1, SLP)  progress slower than expected  -        User Key  (r) = Recorded By, (t) = Taken By, (c) = Cosigned By    Initials Name Provider Type     Monet Rao MS CCC-SLP Speech and Language Pathologist          EDUCATION  The patient has been educated in the following areas:   Dysphagia (Swallowing Impairment).    SLP Recommendation and Plan                                     Plan of Care Reviewed With: patient  Progress: declining           Time Calculation:   Time Calculation- SLP     Row Name 11/16/20 1144             Time Calculation- [facility]    SLP Start Time  1100  -      SLP Received On  11/16/20  -        User Key  (r) = Recorded By, (t) = Taken By, (c) = Cosigned By    Initials Name Provider Type     Monet Rao MS CCC-SLP Speech and Language Pathologist               Therapy Charges for Today     Code Description Service Date Service Provider Modifiers Qty    [id number]  ST TREATMENT SWALLOW 4 11/16/2020 Monet Rao MS CCC-SLP GN 1             Monet Chavez  MS Maira CCC-SLP  11/16/2020

## 2020-11-16 NOTE — NURSING NOTE
Dr. aWre called 11:55. Message left with office.  1230- Dr. Ware paged overhead.  1350- Dr. Ware called. Ok'ed to transfer to [facility].

## 2020-11-16 NOTE — PROGRESS NOTES
"   LOS: 8 days     Chief Complaint/ Reason for encounter: Hyponatremia   Chief Complaint   Patient presents with   • Lip Laceration   • Fall         Subjective   Nonverbal  Sleeping   NPO much of yesterday, diet resumed this morning     Medical history reviewed:  Lip Laceration    Fall        Subjective    History taken from: Patient and chart    Vital Signs  Temp:  [98.1 °F (36.7 °C)] 98.1 °F (36.7 °C)  Heart Rate:  [60-61] 60  Resp:  [16-18] 16  BP: (128-130)/(74-82) 130/82       Wt Readings from Last 1 Encounters:   11/13/20 0550 58.2 kg (128 lb 4.8 oz)   11/09/20 0057 55.8 kg (123 lb)   11/08/20 1850 65.8 kg (145 lb)       Objective:  Vital signs: (most recent): Blood pressure 130/82, pulse 60, temperature 98.1 °F (36.7 °C), temperature source Oral, resp. rate 16, height 172.7 cm (67.99\"), weight 58.2 kg (128 lb 4.8 oz), SpO2 99 %.              Objective:  General Appearance:  Comfortable, malnourished and chronically ill-appearing, in no acute distress and not in pain.  Awake, alert, oriented  HEENT: Mucous membranes moist, no injury, oropharynx clear  Lungs:  Normal effort and normal respiratory rate.  Breath sounds clear to auscultation.  No  respiratory distress.  No rales, decreased breath sounds or rhonchi.    Heart: NSR now with Pacemaker.  Regular rhythm.  S1 normal.  No murmur.   Abdomen: Abdomen is soft.  Bowel sounds are normal, no abdominal tenderness.  There is no rebound or guarding  Extremities: Normal range of motion.  No edema of bilateral lower extremities, distal pulses intact  Neurological: No focal motor or sensory deficits, pupils reactive  Skin:  Warm and dry.  No rash or cyanosis.       Results Review:    Intake/Output:     Intake/Output Summary (Last 24 hours) at 11/16/2020 1119  Last data filed at 11/16/2020 0900  Gross per 24 hour   Intake 210 ml   Output --   Net 210 ml         DATA:  Radiology and Labs:  The following labs independently reviewed by me. Additional labs ordered for " tomorrow a.m.  Interval notes, chart personally reviewed by me.   Old records independently reviewed showing Na 135-137  The following radiologic studies independently viewed by me  New problems include bradycardia, persistent hyponatremia   Discussed with Dr. Allen     Risk/ complexity of medical care/ medical decision making high    Labs:   Recent Results (from the past 24 hour(s))   POC Glucose Once    Collection Time: 11/15/20 11:59 AM    Specimen: Blood   Result Value Ref Range    Glucose 111 70 - 130 mg/dL   POC Glucose Once    Collection Time: 11/15/20  4:22 PM    Specimen: Blood   Result Value Ref Range    Glucose 139 (H) 70 - 130 mg/dL   POC Glucose Once    Collection Time: 11/15/20  8:19 PM    Specimen: Blood   Result Value Ref Range    Glucose 146 (H) 70 - 130 mg/dL   Renal Function Panel    Collection Time: 11/16/20  5:08 AM    Specimen: Blood   Result Value Ref Range    Glucose 92 65 - 99 mg/dL    BUN 19 8 - 23 mg/dL    Creatinine 0.59 (L) 0.76 - 1.27 mg/dL    Sodium 133 (L) 136 - 145 mmol/L    Potassium 4.2 3.5 - 5.2 mmol/L    Chloride 97 (L) 98 - 107 mmol/L    CO2 26.7 22.0 - 29.0 mmol/L    Calcium 9.1 8.6 - 10.5 mg/dL    Albumin 3.40 (L) 3.50 - 5.20 g/dL    Phosphorus 3.8 2.5 - 4.5 mg/dL    Anion Gap 9.3 5.0 - 15.0 mmol/L    BUN/Creatinine Ratio 32.2 (H) 7.0 - 25.0    eGFR Non African Amer 138 >60 mL/min/1.73   POC Glucose Once    Collection Time: 11/16/20  5:46 AM    Specimen: Blood   Result Value Ref Range    Glucose 100 70 - 130 mg/dL   POC Glucose Once    Collection Time: 11/16/20 11:06 AM    Specimen: Blood   Result Value Ref Range    Glucose 122 70 - 130 mg/dL       Radiology:  Imaging Results (Last 24 Hours)     ** No results found for the last 24 hours. **             Medications have been reviewed:  Current Facility-Administered Medications   Medication Dose Route Frequency Provider Last Rate Last Dose   • acetaminophen (TYLENOL) tablet 650 mg  650 mg Oral Q6H TONYAN Abhishek Zaldivar MD    650 mg at 11/11/20 2138   • amLODIPine (NORVASC) tablet 10 mg  10 mg Oral Q24H Abhishek Zaldivar MD   10 mg at 11/16/20 0933   • aspirin EC tablet 81 mg  81 mg Oral Daily Abhishek Zaldivar MD   81 mg at 11/16/20 0933   • atorvastatin (LIPITOR) tablet 20 mg  20 mg Oral Daily Abhishek Zaldivar MD   20 mg at 11/16/20 0933   • bisacodyl (DULCOLAX) suppository 10 mg  10 mg Rectal Daily Susy Chiu MD   10 mg at 11/16/20 0933   • dextrose (D50W) 25 g/ 50mL Intravenous Solution 25 g  25 g Intravenous Q15 Min PRN Abhishek Zaldivar MD       • dextrose (GLUTOSE) oral gel 15 g  15 g Oral Q15 Min PRN Abhishek Zaldivar MD       • divalproex (DEPAKOTE) DR tablet 250 mg  250 mg Oral TID Abhishek Zaldivar MD   250 mg at 11/16/20 0933   • glucagon (human recombinant) (GLUCAGEN DIAGNOSTIC) injection 1 mg  1 mg Subcutaneous Q15 Min PRN Abhishek Zaldivar MD       • insulin lispro (humaLOG) injection 0-7 Units  0-7 Units Subcutaneous TID AC Abhishek Zaldivar MD       • melatonin tablet 5 mg  5 mg Oral Nightly Abhishek Zaldivar MD   5 mg at 11/15/20 2149   • metoprolol succinate XL (TOPROL-XL) 24 hr tablet 25 mg  25 mg Oral Daily Abhishek Zaldivar MD   25 mg at 11/16/20 0933   • OXcarbazepine (TRILEPTAL) tablet 600 mg  600 mg Oral Q12H Abhishek Zaldivar MD   600 mg at 11/16/20 0932   • oxyCODONE-acetaminophen (PERCOCET) 5-325 MG per tablet 1 tablet  1 tablet Oral Q4H PRN Abhishek Zaldivar MD   1 tablet at 11/13/20 2209   • sennosides-docusate (PERICOLACE) 8.6-50 MG per tablet 2 tablet  2 tablet Oral Nightly Susy Chiu MD   2 tablet at 11/15/20 2149   • sodium chloride 0.9 % flush 10 mL  10 mL Intravenous PRN Abhishek Zaldivar MD   10 mL at 11/10/20 2018   • sodium chloride 0.9 % flush 10 mL  10 mL Intravenous PRN Abhishek Zaldivar MD   10 mL at 11/13/20 0926   • sodium chloride 0.9 % flush 10 mL  10 mL Intravenous PRN Abhishek Zaldivar MD       • sodium chloride 0.9 %  flush 3 mL  3 mL Intravenous Q12H Abhishek Zaldivar MD   3 mL at 11/16/20 1045   • sodium chloride tablet 1 g  1 g Oral TID With Meals Abhishek Zaldivar MD   1 g at 11/16/20 0933   • tamsulosin (FLOMAX) 24 hr capsule 0.4 mg  0.4 mg Oral Nightly Abhishek Zaldivar MD   0.4 mg at 11/15/20 0222       ASSESSMENT:   Hyponatremia  UTI  HTN  DM  Swallowing impairment  Severe malnutrition   S/P pacemaker       PLAN:   Labs are consistent with 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

## 2020-11-16 NOTE — PLAN OF CARE
Problem: Adult Inpatient Plan of Care  Goal: Plan of Care Review  Flowsheets (Taken 11/16/2020 1128)  Progress: declining  Plan of Care Reviewed With: patient  Outcome Summary: Patient seen for re evaluation of swallow function. Pt would not interact with SLP this date despite tactile and verbal cues. Pt resting with his eyes minimally open. Nursing assistant reported feeding patient in this state this AM. RN reported episode of delayed coughing with meds in puree after being laid HOB flat. Nursing assistant also reported patient was more interactive in her prior shifts. Pt with poor coordination with accepting liquid bolus this date. Pt would not close his mouth around the cup to take a drink and often blew into the straw instead of sucking. Anterior loss thins via cup. Occasional cough with thins via straw. No overt s/s of aspiration with nectar via cup or puree. Prolonged mastication with mech soft, no oral residue post swallow. Of note, patient did close his mouth around spoon and straw this date. SLP recs downgrade to nectar, continue mech soft. Continue meds in puree. Total feed. Allow free water protocol with follow for diet tolerance and possible advancement.

## 2020-11-16 NOTE — PLAN OF CARE
Goal Outcome Evaluation:  Plan of Care Reviewed With: patient  Progress: declining  Outcome Summary: Arrived from [facility]. Incontience care provided, pt had BM this evening. VSS. Just holding patient until d/c'd back to facility tomorrow. Pills given in applesauce. Will continue to monitor.

## 2020-11-16 NOTE — PLAN OF CARE
Goal Outcome Evaluation:  Plan of Care Reviewed With: patient  Progress: no change  Outcome Summary: gagged once after med emesis of applesauce. Notified Dr. marquez of no BM noted this admission. New orders obtained. No falls or injury. No new skin issues. BP improved.

## 2020-11-16 NOTE — PROGRESS NOTES
Continued Stay Note  [facility]     Patient Name: Trever Petit  MRN: [id number]  Today's Date: 11/16/2020    Admit Date: 11/8/2020    Discharge Plan     Row Name 11/16/20 1428       Plan    Plan  Return to [facility]    Patient/Family in Agreement with Plan  yes    Plan Comments  Plan is to return to [facility].  [facility] following.  He will need EMS at transport.        Discharge Codes    No documentation.       Expected Discharge Date and Time     Expected Discharge Date Expected Discharge Time    Nov 15, 2020             Shannon Epley, RN

## 2020-11-16 NOTE — PLAN OF CARE
Pt goals maintained.  Safety goals progress.   Pt still nonverbal  and a total care.  Goal Outcome Evaluation:  Plan of Care Reviewed With: patient  Progress: declining     Problem: Adult Inpatient Plan of Care  Goal: Plan of Care Review  Outcome: Ongoing, Progressing  Goal: Absence of Hospital-Acquired Illness or Injury  Outcome: Ongoing, Progressing  Goal: Optimal Comfort and Wellbeing  Outcome: Ongoing, Progressing  Goal: Readiness for Transition of Care  Outcome: Ongoing, Progressing     Problem: Fall Injury Risk  Goal: Absence of Fall and Fall-Related Injury  Outcome: Ongoing, Progressing     Problem: Skin Injury Risk Increased  Goal: Skin Health and Integrity  Outcome: Ongoing, Progressing     Problem: Hypertension Acute  Goal: Blood Pressure Within Desired Range  Outcome: Ongoing, Progressing

## 2020-11-17 ENCOUNTER — APPOINTMENT (OUTPATIENT)
Dept: GENERAL RADIOLOGY | Facility: HOSPITAL | Age: 65
End: 2020-11-17

## 2020-11-17 LAB
ALBUMIN SERPL-MCNC: 2.9 G/DL (ref 3.5–5.2)
ANION GAP SERPL CALCULATED.3IONS-SCNC: 7.3 MMOL/L (ref 5–15)
BACTERIA UR QL AUTO: ABNORMAL /HPF
BILIRUB UR QL STRIP: ABNORMAL
BUN SERPL-MCNC: 28 MG/DL (ref 8–23)
BUN/CREAT SERPL: 31.5 (ref 7–25)
CALCIUM SPEC-SCNC: 8.6 MG/DL (ref 8.6–10.5)
CHLORIDE SERPL-SCNC: 100 MMOL/L (ref 98–107)
CLARITY UR: CLEAR
CO2 SERPL-SCNC: 27.7 MMOL/L (ref 22–29)
COLOR UR: ABNORMAL
CREAT SERPL-MCNC: 0.89 MG/DL (ref 0.76–1.27)
DEPRECATED RDW RBC AUTO: 40.2 FL (ref 37–54)
ERYTHROCYTE [DISTWIDTH] IN BLOOD BY AUTOMATED COUNT: 11.6 % (ref 12.3–15.4)
GFR SERPL CREATININE-BSD FRML MDRD: 86 ML/MIN/1.73
GLUCOSE BLDC GLUCOMTR-MCNC: 108 MG/DL (ref 70–130)
GLUCOSE BLDC GLUCOMTR-MCNC: 110 MG/DL (ref 70–130)
GLUCOSE BLDC GLUCOMTR-MCNC: 129 MG/DL (ref 70–130)
GLUCOSE BLDC GLUCOMTR-MCNC: 143 MG/DL (ref 70–130)
GLUCOSE SERPL-MCNC: 92 MG/DL (ref 65–99)
GLUCOSE UR STRIP-MCNC: NEGATIVE MG/DL
HCT VFR BLD AUTO: 30.1 % (ref 37.5–51)
HGB BLD-MCNC: 10.2 G/DL (ref 13–17.7)
HGB UR QL STRIP.AUTO: ABNORMAL
HYALINE CASTS UR QL AUTO: ABNORMAL /LPF
KETONES UR QL STRIP: ABNORMAL
LEUKOCYTE ESTERASE UR QL STRIP.AUTO: NEGATIVE
MCH RBC QN AUTO: 32.3 PG (ref 26.6–33)
MCHC RBC AUTO-ENTMCNC: 33.9 G/DL (ref 31.5–35.7)
MCV RBC AUTO: 95.3 FL (ref 79–97)
NITRITE UR QL STRIP: NEGATIVE
PH UR STRIP.AUTO: <=5 [PH] (ref 5–8)
PHOSPHATE SERPL-MCNC: 4 MG/DL (ref 2.5–4.5)
PLATELET # BLD AUTO: 276 10*3/MM3 (ref 140–450)
PMV BLD AUTO: 9.1 FL (ref 6–12)
POTASSIUM SERPL-SCNC: 4.1 MMOL/L (ref 3.5–5.2)
PROT UR QL STRIP: ABNORMAL
RBC # BLD AUTO: 3.16 10*6/MM3 (ref 4.14–5.8)
RBC # UR: ABNORMAL /HPF
REF LAB TEST METHOD: ABNORMAL
SODIUM SERPL-SCNC: 135 MMOL/L (ref 136–145)
SP GR UR STRIP: >=1.03 (ref 1–1.03)
SQUAMOUS #/AREA URNS HPF: ABNORMAL /HPF
UROBILINOGEN UR QL STRIP: ABNORMAL
WBC # BLD AUTO: 13.8 10*3/MM3 (ref 3.4–10.8)
WBC UR QL AUTO: ABNORMAL /HPF

## 2020-11-17 PROCEDURE — 85027 COMPLETE CBC AUTOMATED: CPT | Performed by: INTERNAL MEDICINE

## 2020-11-17 PROCEDURE — 92526 ORAL FUNCTION THERAPY: CPT

## 2020-11-17 PROCEDURE — 80069 RENAL FUNCTION PANEL: CPT | Performed by: INTERNAL MEDICINE

## 2020-11-17 PROCEDURE — 71045 X-RAY EXAM CHEST 1 VIEW: CPT

## 2020-11-17 PROCEDURE — 81001 URINALYSIS AUTO W/SCOPE: CPT | Performed by: INTERNAL MEDICINE

## 2020-11-17 PROCEDURE — 82962 GLUCOSE BLOOD TEST: CPT

## 2020-11-17 RX ORDER — SODIUM CHLORIDE 9 MG/ML
100 INJECTION, SOLUTION INTRAVENOUS CONTINUOUS
Status: DISCONTINUED | OUTPATIENT
Start: 2020-11-17 | End: 2020-11-18

## 2020-11-17 RX ORDER — DIVALPROEX SODIUM 125 MG/1
125 TABLET, DELAYED RELEASE ORAL 3 TIMES DAILY
Status: DISCONTINUED | OUTPATIENT
Start: 2020-11-17 | End: 2020-11-18

## 2020-11-17 RX ADMIN — BISACODYL 10 MG: 10 SUPPOSITORY RECTAL at 12:32

## 2020-11-17 RX ADMIN — ATORVASTATIN CALCIUM 20 MG: 20 TABLET, FILM COATED ORAL at 09:19

## 2020-11-17 RX ADMIN — DIVALPROEX SODIUM 250 MG: 250 TABLET, DELAYED RELEASE ORAL at 09:19

## 2020-11-17 RX ADMIN — SODIUM CHLORIDE 100 ML/HR: 9 INJECTION, SOLUTION INTRAVENOUS at 15:07

## 2020-11-17 RX ADMIN — OXCARBAZEPINE 600 MG: 300 TABLET, FILM COATED ORAL at 09:19

## 2020-11-17 RX ADMIN — ASPIRIN 81 MG: 81 TABLET, FILM COATED ORAL at 09:19

## 2020-11-17 NOTE — PLAN OF CARE
Goal Outcome Evaluation:  Plan of Care Reviewed With: patient  Progress: no change  Outcome Summary: uneventful night, q2 turns, pills with applesauce

## 2020-11-17 NOTE — THERAPY TREATMENT NOTE
Acute Care - IRF Speech Language Pathology   Swallow Treatment Note/Discharge   [facility]     Patient Name: Trever Petit  : 1955  MRN: [id number]  Today's Date: 2020               Admit Date: 2020    Visit Dx:      ICD-10-CM ICD-9-CM   1. Malignant hypertensive urgency  I16.0 401.0   2. Bradycardia chronic  R00.1 427.89   3. Contusion of face, initial encounter  S00.83XA 920   4. Dementia with behavioral disturbance, unspecified dementia type (CMS/HCC)  F03.91 294.21     Patient Active Problem List   Diagnosis   • Hypertensive emergency   • Essential hypertension   • Stroke (CMS/HCC)   • Hypoglycemia   • Vascular dementia (CMS/HCC)   • Type 2 diabetes mellitus, without long-term current use of insulin (CMS/HCC)   • Constipation   • Leukocytosis   • Fever   • Closed fracture of multiple ribs of left side   • Pleural effusion   • Left lower lobe pneumonia   • Cystitis   • Seizure-like activity (CMS/HCC)   • E. coli UTI (urinary tract infection)   • Fall   • C1 cervical fracture (CMS/HCC)   • Bradycardia   • Malignant hypertensive urgency   • Severe malnutrition (CMS/HCC)   • Dysphagia       Therapy Treatment    Patient was not wearing a face mask during this therapy encounter. Therapist used appropriate personal protective equipment including mask, eye protection and gloves.  Mask used was standard procedure mask. Appropriate PPE was worn during the entire therapy session. Hand hygiene was completed before and after therapy session. Patient is not in enhanced droplet precautions.         SLP GOALS     Row Name 20 0800 20 1100          Oral Nutrition/Hydration Goal 1 (SLP)    Oral Nutrition/Hydration Goal 1, SLP  Tolerate least restrictive diet  -SH  Tolerate least restrictive diet  -SH     Time Frame (Oral Nutrition/Hydration Goal 1, SLP)  by discharge  -SH  by discharge  -SH     Barriers (Oral Nutrition/Hydration Goal 1, SLP)  Progress: Patient seen for re evaluation of swallow  function. Eyes open this date. Pt continues to be nonverbal during assessment. RN reported nightshift indicated some coughing with PO. Anterior loss with nectar via cup from L. Pt unable to suck from straw this date, but no overt s/s of asp with nectar via spoon/cup. Oral holding and slow AP transit with puree. Laryngeal elevation reduced. D/t report of poor intake, coughing with PO, and continuous mastication with solids; SLP recs downgrade to puree, continue nectar. No straws. Meds crushed or whole in puree. Total feed. Oral care was completed following assessment.  -  Progress: Patient seen for re evaluation of swallow function. Pt would not interact with SLP this date despite tactile and verbal cues. Pt resting with his eyes minimally open. Nursing assistant reported feeding patient in this state this AM. RN reported episode of delayed coughing with meds in puree after being laid HOB flat. Nursing assistant also reported patient was more interactive in her prior shifts. Pt with poor coordination with accepting liquid bolus this date. Pt would not close his mouth around the cup to take a drink and often blew into the straw instead of sucking. Anterior loss thins via cup. Occasional cough with thins via straw. No overt s/s of aspiration with nectar via cup or puree. Prolonged mastication with mech soft, no oral residue post swallow. Of note, patient did close his mouth around spoon and straw this date. SLP recs downgrade to nectar, continue mech soft. Continue meds in puree. Allow free water protocol with follow for diet tolerance and possible advancement.  -SH     Progress/Outcomes (Oral Nutrition/Hydration Goal 1, SLP)  progress slower than expected  -SH  progress slower than expected  -       User Key  (r) = Recorded By, (t) = Taken By, (c) = Cosigned By    Initials Name Provider Type    Monet Hays MS CCC-SLP Speech and Language Pathologist          EDUCATION  The patient has been educated in the  following areas:   Dysphagia (Swallowing Impairment).    SLP Recommendation and Plan                                     Plan of Care Reviewed With: patient  Progress: declining           Time Calculation:   Time Calculation- SLP     Row Name 11/17/20 1353             Time Calculation- SLP    SLP Start Time  0730  -      SLP Received On  11/17/20  -        User Key  (r) = Recorded By, (t) = Taken By, (c) = Cosigned By    Initials Name Provider Type     Monet Rao MS CCC-SLP Speech and Language Pathologist          Therapy Charges for Today     Code Description Service Date Service Provider Modifiers Qty    89421316608 HC ST TREATMENT SWALLOW 4 11/16/2020 Monet Rao MS CCC-SLP GN 1    80090402086 HC ST TREATMENT SWALLOW 3 11/17/2020 Monet Rao MS CCC-SLP GN 1                    MS KRISTEN Burroughs  11/17/2020

## 2020-11-17 NOTE — PROGRESS NOTES
"DAILY PROGRESS NOTE  [facility], [facility]    2020    Patient Identification:  Name: Trever Petit  Age: 64 y.o.  Sex: male  :  1955  MRN: [id number]           Primary Care Physician: George Ware MD    Subjective:    Interval History:    Patient is lethargic, very difficult to arouse today.  Pacemaker working okay.  Blood sugar okay  Sodium 135, creatinine 0.89, hemoglobin 12,  Has not been eating or drinking much.  WBC 13.    ROS:     There is no report for nausea, vomiting, diarrhea, constipation, chest pain, shortness of breath.    Objective:    Scheduled Meds:  amLODIPine, 10 mg, Oral, Q24H  aspirin, 81 mg, Oral, Daily  atorvastatin, 20 mg, Oral, Daily  bisacodyl, 10 mg, Rectal, Daily  divalproex, 250 mg, Oral, TID  insulin lispro, 0-7 Units, Subcutaneous, TID AC  melatonin, 5 mg, Oral, Nightly  metoprolol succinate XL, 25 mg, Oral, Daily  OXcarbazepine, 600 mg, Oral, Q12H  senna-docusate sodium, 2 tablet, Oral, Nightly  sodium chloride, 3 mL, Intravenous, Q12H  tamsulosin, 0.4 mg, Oral, Nightly        Continuous Infusions:       PRN Meds:  •  acetaminophen  •  dextrose  •  dextrose  •  glucagon (human recombinant)  •  oxyCODONE-acetaminophen  •  sodium chloride  •  [COMPLETED] Insert peripheral IV **AND** sodium chloride  •  sodium chloride    Intake/Output:    Intake/Output Summary (Last 24 hours) at 2020 1025  Last data filed at 2020 0916  Gross per 24 hour   Intake 130 ml   Output --   Net 130 ml         Exam:    tMax 24 hrs: Temp (24hrs), Av.8 °F (36.6 °C), Min:97 °F (36.1 °C), Max:98.6 °F (37 °C)    Vitals Ranges:   Temp:  [97 °F (36.1 °C)-98.6 °F (37 °C)] 98.6 °F (37 °C)  Heart Rate:  [60-85] 64  Resp:  [14-16] 14  BP: ()/(50-84) 80/50    BP (!) 80/50 (BP Location: Left arm, Patient Position: Lying)   Pulse 64   Temp 98.6 °F (37 °C) (Oral)   Resp 14   Ht 172.7 cm (67.99\")   Wt 58.2 kg (128 lb 4.8 oz) Comment: " wihtout pumps on bottom of bed  SpO2 90%   BMI 19.51 kg/m²     General: Patient is lethargic, very difficult to arouse.  No respiratory distress  Oropharynx: Contusion and abrasion of the left aspect of patient's chin.  Poor dentition.  Laceration intraorally at the frenulum, healing.  Contusion of left sided lip.  Neck: Supple, symmetrical, trachea midline, no adenopathy;              thyroid:  no enlargement/tenderness/nodules;              no carotid bruit or JVD  Cardiovascular: Normal rate, regular rhythm and intact distal pulses.              Exam reveals no gallop and no friction rub. No murmur heard  Pulmonary: Clear to auscultation bilaterally, respirations unlabored.               No rhonchi, wheezing or rales.   Abdominal: Soft, nontender, bowel sounds active all four quadrants,     no masses, no hepatomegaly, no splenomegaly.  Cuellar in place  Extremities: Normal, atraumatic, able to move upper extremity, no much movement of the lower extremity.  Pulses: 2 + symmetric all extremities  Neurological: Patient is lethargic, very difficult to arouse. No new focal sensorimotor deficit.    Skin: Skin color, texture, normal. Turgor is decreased. No rashes or lesions       Data Review:    Results from last 7 days   Lab Units 11/17/20  0433 11/14/20  0532 11/13/20  0457   WBC 10*3/mm3 13.80* 8.36 7.02   HEMOGLOBIN g/dL 10.2* 10.9* 10.9*   HEMATOCRIT % 30.1* 31.4* 31.0*   PLATELETS 10*3/mm3 276 252 255       Results from last 7 days   Lab Units 11/17/20  0433 11/16/20  0508 11/15/20  0309   SODIUM mmol/L 135* 133* 130*   POTASSIUM mmol/L 4.1 4.2 4.3   CHLORIDE mmol/L 100 97* 96*   CO2 mmol/L 27.7 26.7 25.9   BUN mg/dL 28* 19 15   CREATININE mg/dL 0.89 0.59* 0.57*   CALCIUM mg/dL 8.6 9.1 8.6   GLUCOSE mg/dL 92 92 91                 Lab Results   Lab Value Date/Time    TROPONINT 0.019 05/23/2020 0921    TROPONINT <0.010 08/30/2019 0443    TROPONINT <0.010 08/28/2019 1308    TROPONINT <0.010 08/19/2019 9485     TROPONINT <0.010 08/19/2019 1538    TROPONINT <0.010 06/25/2019 1703    TROPONINT <0.010 06/25/2019 1502    TROPONINT <0.010 06/22/2019 0028    TROPONINT <0.010 06/21/2019 1846       Microbiology Results (last 10 days)     Procedure Component Value - Date/Time    Urine Culture - Urine, Urine, Catheter In/Out [[id number]]  (Abnormal)  (Susceptibility) Collected: 11/09/20 2102    Lab Status: Final result Specimen: Urine, Catheter In/Out Updated: 11/12/20 0142     Urine Culture >100,000 CFU/mL Enterococcus faecalis    Susceptibility      Enterococcus faecalis     ALLISON     Ampicillin Susceptible     Levofloxacin Susceptible     Nitrofurantoin Susceptible     Tetracycline Resistant     Vancomycin Susceptible                    COVID PRE-OP / PRE-PROCEDURE SCREENING ORDER (NO ISOLATION) - Swab, Nasopharynx [[id number]]  (Normal) Collected: 11/08/20 2333    Lab Status: Final result Specimen: Swab from Nasopharynx Updated: 11/09/20 0053    Narrative:      The following orders were created for panel order COVID PRE-OP / PRE-PROCEDURE SCREENING ORDER (NO ISOLATION) - Swab, Nasopharynx.  Procedure                               Abnormality         Status                     ---------                               -----------         ------                     Respiratory Panel PCR w/...[[id number]]  Normal              Final result                 Please view results for these tests on the individual orders.    Respiratory Panel PCR w/COVID-19(SARS-CoV-2) [facility]TA/[facility] In-House, NP Swab in UTM/VTM, 3-4 HR TAT - Swab, Nasopharynx [[id number]]  (Normal) Collected: 11/08/20 2333    Lab Status: Final result Specimen: Swab from Nasopharynx Updated: 11/09/20 0053     ADENOVIRUS, PCR Not Detected     Coronavirus 229E Not Detected     Coronavirus HKU1 Not Detected     Coronavirus NL63 Not Detected     Coronavirus OC43 Not Detected     COVID19 Not Detected     Human Metapneumovirus Not Detected     Human Rhinovirus/Enterovirus  Not Detected     Influenza A PCR Not Detected     Influenza B PCR Not Detected     Parainfluenza Virus 1 Not Detected     Parainfluenza Virus 2 Not Detected     Parainfluenza Virus 3 Not Detected     Parainfluenza Virus 4 Not Detected     RSV, PCR Not Detected     Bordetella pertussis pcr Not Detected     Bordetella parapertussis PCR Not Detected     Chlamydophila pneumoniae PCR Not Detected     Mycoplasma pneumo by PCR Not Detected    Narrative:      Fact sheet for providers: https://docs.[employer]/wp-content/uploads/WGF5281-6398-AY3.1-EUA-Provider-Fact-Sheet-3.pdf    Fact sheet for patients: https://docs.[employer]/wp-content/uploads/OHS1586-4970-QE6.1-EUA-Patient-Fact-Sheet-1.pdf           Imaging Results (Last 7 Days)     ** No results found for the last 168 hours. **            Assessment:        Malignant hypertensive urgency    Severe malnutrition (CMS/HCC)    Dysphagia  Urinary tract infection per Enterococcus faecalis  Dehydration  Chronic worsening  bradycardia  Face contusion  Probably concussion  Vascular dementia with visual disturbance  COPD  Diabetes mellitus  Hypertension  Late effects of CVA  Persistent hyponatremia  SIADH  Metabolic encephalopathy    Plan:    New altered mental status, will work-up, will get x-ray, urinalysis, laboratory is okay.  He may need a CT scan.  Pacemaker working okay  Has completed antibiotic for Enterococcus faecalis UTI  Sodium improving  Adjust insulin as needed  DVT/stress ulcer prophylaxis  Labs in       George Ware MD  11/17/2020

## 2020-11-17 NOTE — PLAN OF CARE
Problem: Adult Inpatient Plan of Care  Goal: Plan of Care Review  Flowsheets (Taken 11/17/2020 0841)  Progress: declining  Plan of Care Reviewed With: patient  Outcome Summary:   Patient seen for re evaluation of swallow function. Eyes open this date. Pt continues to be nonverbal during assessment. RN reported nightshift indicated some coughing with PO. Anterior loss with nectar via cup from L. Pt unable to suck from straw this date, but no overt s/s of asp with nectar via spoon/cup. Oral holding and slow AP transit with puree. Laryngeal elevation reduced. D/t report of poor intake, coughing with PO, and continuous mastication with solids; SLP recs downgrade to puree, continue nectar. No straws. Meds crushed or whole in puree. Total feed. Oral care was completed following assessment.

## 2020-11-17 NOTE — PLAN OF CARE
Goal Outcome Evaluation:  Plan of Care Reviewed With: patient  Progress: declining  Outcome Summary: Patient nonverbal and more difficult to arouse today. Increased difficulty swallowing and more coughing noted with PO intake. Patient placed NPO per MD, and urinalysis w/culture ordered.

## 2020-11-17 NOTE — PROGRESS NOTES
"   LOS: 9 days     Chief Complaint/ Reason for encounter: Hyponatremia   Chief Complaint   Patient presents with   • Lip Laceration   • Fall         Subjective   Nonverbal  Sleeping   Bp below goal    Medical history reviewed:  Lip Laceration    Fall        Subjective    History taken from: Patient and chart    Vital Signs  Temp:  [97 °F (36.1 °C)-98.6 °F (37 °C)] 98.6 °F (37 °C)  Heart Rate:  [60-85] 64  Resp:  [14-16] 14  BP: ()/(50-81) 80/50       Wt Readings from Last 1 Encounters:   11/13/20 0550 58.2 kg (128 lb 4.8 oz)   11/09/20 0057 55.8 kg (123 lb)   11/08/20 1850 65.8 kg (145 lb)       Objective:  Vital signs: (most recent): Blood pressure (!) 80/50, pulse 64, temperature 98.6 °F (37 °C), temperature source Oral, resp. rate 14, height 172.7 cm (67.99\"), weight 58.2 kg (128 lb 4.8 oz), SpO2 90 %.              Objective:  General Appearance:  Comfortable, malnourished and chronically ill-appearing, in no acute distress and not in pain.  Awake, alert, oriented  HEENT: Mucous membranes moist, no injury, oropharynx clear  Lungs:  Normal effort and normal respiratory rate.  Breath sounds clear to auscultation.  No  respiratory distress.  No rales, decreased breath sounds or rhonchi.    Heart: NSR now with Pacemaker.  Regular rhythm.  S1 normal.  No murmur.   Abdomen: Abdomen is soft.  Bowel sounds are normal, no abdominal tenderness.  There is no rebound or guarding  Extremities: Normal range of motion.  No edema of bilateral lower extremities, distal pulses intact  Neurological: No focal motor or sensory deficits, pupils reactive  Skin:  Warm and dry.  No rash or cyanosis.       Results Review:    Intake/Output:     Intake/Output Summary (Last 24 hours) at 11/17/2020 1442  Last data filed at 11/17/2020 0916  Gross per 24 hour   Intake 10 ml   Output --   Net 10 ml         DATA:  Radiology and Labs:  The following labs independently reviewed by me. Additional labs ordered for tomorrow a.m.  Interval notes, " chart personally reviewed by me.   Imaging reviewed: Unremarkable CXR, patent pacemaker noted  New problems include bradycardia, persistent hypotension, very diminished UOP, volume depleted   Discussed with Dr. Allen     Risk/ complexity of medical care/ medical decision making high    Labs:   Recent Results (from the past 24 hour(s))   POC Glucose Once    Collection Time: 11/16/20  4:44 PM    Specimen: Blood   Result Value Ref Range    Glucose 129 70 - 130 mg/dL   POC Glucose Once    Collection Time: 11/16/20  4:47 PM    Specimen: Blood   Result Value Ref Range    Glucose 121 70 - 130 mg/dL   POC Glucose Once    Collection Time: 11/16/20  8:13 PM    Specimen: Blood   Result Value Ref Range    Glucose 115 70 - 130 mg/dL   Renal Function Panel    Collection Time: 11/17/20  4:33 AM    Specimen: Blood   Result Value Ref Range    Glucose 92 65 - 99 mg/dL    BUN 28 (H) 8 - 23 mg/dL    Creatinine 0.89 0.76 - 1.27 mg/dL    Sodium 135 (L) 136 - 145 mmol/L    Potassium 4.1 3.5 - 5.2 mmol/L    Chloride 100 98 - 107 mmol/L    CO2 27.7 22.0 - 29.0 mmol/L    Calcium 8.6 8.6 - 10.5 mg/dL    Albumin 2.90 (L) 3.50 - 5.20 g/dL    Phosphorus 4.0 2.5 - 4.5 mg/dL    Anion Gap 7.3 5.0 - 15.0 mmol/L    BUN/Creatinine Ratio 31.5 (H) 7.0 - 25.0    eGFR Non African Amer 86 >60 mL/min/1.73   CBC (No Diff)    Collection Time: 11/17/20  4:33 AM    Specimen: Blood   Result Value Ref Range    WBC 13.80 (H) 3.40 - 10.80 10*3/mm3    RBC 3.16 (L) 4.14 - 5.80 10*6/mm3    Hemoglobin 10.2 (L) 13.0 - 17.7 g/dL    Hematocrit 30.1 (L) 37.5 - 51.0 %    MCV 95.3 79.0 - 97.0 fL    MCH 32.3 26.6 - 33.0 pg    MCHC 33.9 31.5 - 35.7 g/dL    RDW 11.6 (L) 12.3 - 15.4 %    RDW-SD 40.2 37.0 - 54.0 fl    MPV 9.1 6.0 - 12.0 fL    Platelets 276 140 - 450 10*3/mm3   POC Glucose Once    Collection Time: 11/17/20  7:37 AM    Specimen: Blood   Result Value Ref Range    Glucose 108 70 - 130 mg/dL   POC Glucose Once    Collection Time: 11/17/20 12:14 PM    Specimen: Blood    Result Value Ref Range    Glucose 143 (H) 70 - 130 mg/dL       Radiology:  Imaging Results (Last 24 Hours)     Procedure Component Value Units Date/Time    XR Chest 1 View [[id number]] Collected: 11/17/20 1126     Updated: 11/17/20 1130    Narrative:      XR CHEST 1 VW-     Clinical: Altered mental status     COMPARISON 05/23/2020     FINDINGS: Transvenous pacemaker has been placed within the interim.  Position is satisfactory. Cardiac size within normal limits. No  mediastinal abnormality has developed. Old granulomatous disease right  lung. No effusion, edema or acute airspace disease has developed within  the interim.     CONCLUSION: Transvenous pacemaker has been placed since 05/23/2020. No  acute cardiovascular or pulmonary process is demonstrated.     This report was finalized on 11/17/2020 11:27 AM by Dr. Luan Huerta M.D.                Medications have been reviewed:  Current Facility-Administered Medications   Medication Dose Route Frequency Provider Last Rate Last Dose   • acetaminophen (TYLENOL) tablet 650 mg  650 mg Oral Q6H PRN Abhishek Zaldivar MD   650 mg at 11/11/20 2138   • amLODIPine (NORVASC) tablet 10 mg  10 mg Oral Q24H Abhishek Zaldivar MD   10 mg at 11/16/20 0933   • aspirin EC tablet 81 mg  81 mg Oral Daily Abhishek Zaldivar MD   81 mg at 11/17/20 0919   • atorvastatin (LIPITOR) tablet 20 mg  20 mg Oral Daily Abhishek Zaldivar MD   20 mg at 11/17/20 0919   • bisacodyl (DULCOLAX) suppository 10 mg  10 mg Rectal Daily Susy Chiu MD   10 mg at 11/17/20 1232   • dextrose (D50W) 25 g/ 50mL Intravenous Solution 25 g  25 g Intravenous Q15 Min PRN Abhishek Zaldivar MD       • dextrose (GLUTOSE) oral gel 15 g  15 g Oral Q15 Min PRN Abhishek Zaldivar MD       • divalproex (DEPAKOTE) DR tablet 125 mg  125 mg Oral TID George Ware MD       • glucagon (human recombinant) (GLUCAGEN DIAGNOSTIC) injection 1 mg  1 mg Subcutaneous Q15 Min PRN Abhishek Zaldivar MD        • insulin lispro (humaLOG) injection 0-7 Units  0-7 Units Subcutaneous TID AC Abhishek Zaldivar MD       • metoprolol succinate XL (TOPROL-XL) 24 hr tablet 25 mg  25 mg Oral Daily Abhishek Zaldivar MD   25 mg at 11/16/20 0933   • OXcarbazepine (TRILEPTAL) tablet 600 mg  600 mg Oral Q12H Abhishek Zaldivar MD   600 mg at 11/17/20 0919   • oxyCODONE-acetaminophen (PERCOCET) 5-325 MG per tablet 1 tablet  1 tablet Oral Q4H PRN Abhishek Zaldivar MD   1 tablet at 11/16/20 2025   • sennosides-docusate (PERICOLACE) 8.6-50 MG per tablet 2 tablet  2 tablet Oral Nightly StingSusy jordan MD   2 tablet at 11/16/20 2025   • sodium chloride 0.9 % flush 10 mL  10 mL Intravenous PRN Abhishek Zaldivar MD   10 mL at 11/10/20 2018   • sodium chloride 0.9 % flush 10 mL  10 mL Intravenous PRN Abhishek Zaldivar MD   10 mL at 11/13/20 0926   • sodium chloride 0.9 % flush 10 mL  10 mL Intravenous PRN Abhishek Zaldivar MD       • sodium chloride 0.9 % flush 3 mL  3 mL Intravenous Q12H Abhishek Zaldivar MD   3 mL at 11/16/20 2026   • tamsulosin (FLOMAX) 24 hr capsule 0.4 mg  0.4 mg Oral Nightly Abhishek Zaldivar MD   0.4 mg at 11/16/20 2025       ASSESSMENT:   Hyponatremia, improved  Hypotension  Volume depletion   UTI, resolved  DM  Swallowing impairment  Severe malnutrition   S/P pacemaker       PLAN:   Na is WNL, However I am concerned that he is volume depleted and could benefit from IVF  Will start him on fluids, which should help with his hypotension  Will hold Norvasc for now until BP reaches goal  Repeat labs in AM  Off Na tabs  Encourage PO   Diet as tolerated      JOCELINE Bentley  [facility]   Office phone number: [phone]  Answering service phone number: [phone]    11/17/20  14:42 EST

## 2020-11-18 ENCOUNTER — APPOINTMENT (OUTPATIENT)
Dept: CT IMAGING | Facility: HOSPITAL | Age: 65
End: 2020-11-18

## 2020-11-18 LAB
ALBUMIN SERPL-MCNC: 2.9 G/DL (ref 3.5–5.2)
ANION GAP SERPL CALCULATED.3IONS-SCNC: 8 MMOL/L (ref 5–15)
BASOPHILS # BLD AUTO: 0.04 10*3/MM3 (ref 0–0.2)
BASOPHILS NFR BLD AUTO: 0.4 % (ref 0–1.5)
BUN SERPL-MCNC: 33 MG/DL (ref 8–23)
BUN/CREAT SERPL: 47.1 (ref 7–25)
CALCIUM SPEC-SCNC: 8.4 MG/DL (ref 8.6–10.5)
CHLORIDE SERPL-SCNC: 103 MMOL/L (ref 98–107)
CO2 SERPL-SCNC: 26 MMOL/L (ref 22–29)
CREAT SERPL-MCNC: 0.7 MG/DL (ref 0.76–1.27)
DEPRECATED RDW RBC AUTO: 40.8 FL (ref 37–54)
EOSINOPHIL # BLD AUTO: 0.07 10*3/MM3 (ref 0–0.4)
EOSINOPHIL NFR BLD AUTO: 0.7 % (ref 0.3–6.2)
ERYTHROCYTE [DISTWIDTH] IN BLOOD BY AUTOMATED COUNT: 11.8 % (ref 12.3–15.4)
GFR SERPL CREATININE-BSD FRML MDRD: 114 ML/MIN/1.73
GLUCOSE BLDC GLUCOMTR-MCNC: 75 MG/DL (ref 70–130)
GLUCOSE BLDC GLUCOMTR-MCNC: 77 MG/DL (ref 70–130)
GLUCOSE BLDC GLUCOMTR-MCNC: 83 MG/DL (ref 70–130)
GLUCOSE BLDC GLUCOMTR-MCNC: 86 MG/DL (ref 70–130)
GLUCOSE SERPL-MCNC: 77 MG/DL (ref 65–99)
HCT VFR BLD AUTO: 29.4 % (ref 37.5–51)
HGB BLD-MCNC: 9.9 G/DL (ref 13–17.7)
IMM GRANULOCYTES # BLD AUTO: 0.05 10*3/MM3 (ref 0–0.05)
IMM GRANULOCYTES NFR BLD AUTO: 0.5 % (ref 0–0.5)
LYMPHOCYTES # BLD AUTO: 2.74 10*3/MM3 (ref 0.7–3.1)
LYMPHOCYTES NFR BLD AUTO: 26.5 % (ref 19.6–45.3)
MCH RBC QN AUTO: 32.2 PG (ref 26.6–33)
MCHC RBC AUTO-ENTMCNC: 33.7 G/DL (ref 31.5–35.7)
MCV RBC AUTO: 95.8 FL (ref 79–97)
MONOCYTES # BLD AUTO: 1.07 10*3/MM3 (ref 0.1–0.9)
MONOCYTES NFR BLD AUTO: 10.3 % (ref 5–12)
NEUTROPHILS NFR BLD AUTO: 6.37 10*3/MM3 (ref 1.7–7)
NEUTROPHILS NFR BLD AUTO: 61.6 % (ref 42.7–76)
NRBC BLD AUTO-RTO: 0 /100 WBC (ref 0–0.2)
PHOSPHATE SERPL-MCNC: 3.1 MG/DL (ref 2.5–4.5)
PLATELET # BLD AUTO: 268 10*3/MM3 (ref 140–450)
PMV BLD AUTO: 9.5 FL (ref 6–12)
POTASSIUM SERPL-SCNC: 3.9 MMOL/L (ref 3.5–5.2)
RBC # BLD AUTO: 3.07 10*6/MM3 (ref 4.14–5.8)
SODIUM SERPL-SCNC: 137 MMOL/L (ref 136–145)
WBC # BLD AUTO: 10.34 10*3/MM3 (ref 3.4–10.8)

## 2020-11-18 PROCEDURE — 70450 CT HEAD/BRAIN W/O DYE: CPT

## 2020-11-18 PROCEDURE — 80069 RENAL FUNCTION PANEL: CPT | Performed by: INTERNAL MEDICINE

## 2020-11-18 PROCEDURE — 25010000002 LEVETIRACETAM IN NACL 0.82% 500 MG/100ML SOLUTION: Performed by: INTERNAL MEDICINE

## 2020-11-18 PROCEDURE — 85025 COMPLETE CBC W/AUTO DIFF WBC: CPT | Performed by: INTERNAL MEDICINE

## 2020-11-18 PROCEDURE — 82962 GLUCOSE BLOOD TEST: CPT

## 2020-11-18 RX ORDER — ASPIRIN 300 MG/1
300 SUPPOSITORY RECTAL DAILY
Status: DISCONTINUED | OUTPATIENT
Start: 2020-11-18 | End: 2020-11-18

## 2020-11-18 RX ORDER — ASPIRIN 300 MG/1
300 SUPPOSITORY RECTAL DAILY
Status: DISCONTINUED | OUTPATIENT
Start: 2020-11-18 | End: 2020-11-20 | Stop reason: HOSPADM

## 2020-11-18 RX ORDER — LEVETIRACETAM 5 MG/ML
500 INJECTION INTRAVASCULAR EVERY 12 HOURS SCHEDULED
Status: DISCONTINUED | OUTPATIENT
Start: 2020-11-18 | End: 2020-11-19

## 2020-11-18 RX ORDER — LABETALOL HYDROCHLORIDE 5 MG/ML
20 INJECTION, SOLUTION INTRAVENOUS ONCE
Status: COMPLETED | OUTPATIENT
Start: 2020-11-18 | End: 2020-11-18

## 2020-11-18 RX ORDER — LABETALOL HYDROCHLORIDE 5 MG/ML
20 INJECTION, SOLUTION INTRAVENOUS ONCE
Status: DISCONTINUED | OUTPATIENT
Start: 2020-11-18 | End: 2020-11-18

## 2020-11-18 RX ADMIN — SODIUM CHLORIDE 100 ML/HR: 9 INJECTION, SOLUTION INTRAVENOUS at 10:42

## 2020-11-18 RX ADMIN — ASPIRIN 300 MG: 300 SUPPOSITORY RECTAL at 20:26

## 2020-11-18 RX ADMIN — LEVETIRACETAM 500 MG: 5 INJECTION INTRAVENOUS at 20:26

## 2020-11-18 RX ADMIN — LABETALOL HYDROCHLORIDE 20 MG: 5 INJECTION, SOLUTION INTRAVENOUS at 20:26

## 2020-11-18 RX ADMIN — SODIUM CHLORIDE, PRESERVATIVE FREE 3 ML: 5 INJECTION INTRAVENOUS at 22:41

## 2020-11-18 NOTE — PLAN OF CARE
Goal Outcome Evaluation:  Plan of Care Reviewed With: patient  Progress: declining  Outcome Summary: Pt has been resting in bed. Pt remains mostly unresponsive. Plans to d/c back to nursing facility with [facility] to follow.

## 2020-11-18 NOTE — PLAN OF CARE
Goal Outcome Evaluation:  Plan of Care Reviewed With: patient  Progress: declining  Outcome Summary: Increased difficulty swallowing, npo now, IVF, urinalysis w CX sent, more stiff when allowing us to change him

## 2020-11-18 NOTE — CONSULTS
Purpose of the visit was to evaluate for: goals of care/advanced care planning, support for patient/family and hospice referral/discussion. Spoke with RN as well as POA and discussed palliative care, goals of care, care options, resuscitation status, [facility] and discharge options.      Assessment:  Patient is palliative care appropriate for community based services with [facility] per advancing dementia, dysphagia, malnutrition. PPS 40%    Recommendations/Plan: [facility] evaluation for community based services.    Other Comments: Palliative Care spoke with brotherLes by phone. States he knows his dementia is getting worse and swallowing has been a problem for a while. He knows he would not want long term tube feeding. He knows resuscitation would not be helpful in light of his dementia.     States the nursing home mentioned [facility] in the past few months, but he never heard anything.      He would like a [facility] evaluation for care and services at the nursing home.     We will sign off as plans are to return to [facility] when ready.

## 2020-11-18 NOTE — PROGRESS NOTES
"DAILY PROGRESS NOTE  [facility], [facility]    2020    Patient Identification:  Name: Trever Petit  Age: 64 y.o.  Sex: male  :  1955  MRN: [id number]           Primary Care Physician: George Ware MD    Subjective:    Interval History:    Patient is awake, however no connected.  CT scan of the head reviewed  Having difficulty eating or drinking, n.p.o. now  Sodium 137, creatinine 0.7, hemoglobin 9.9    ROS:     There is no report for nausea, vomiting, diarrhea, constipation, chest pain, shortness of breath.    Objective:    Scheduled Meds:  aspirin, 81 mg, Oral, Daily  atorvastatin, 20 mg, Oral, Daily  bisacodyl, 10 mg, Rectal, Daily  divalproex, 125 mg, Oral, TID  insulin lispro, 0-7 Units, Subcutaneous, TID AC  metoprolol succinate XL, 25 mg, Oral, Daily  OXcarbazepine, 600 mg, Oral, Q12H  senna-docusate sodium, 2 tablet, Oral, Nightly  sodium chloride, 3 mL, Intravenous, Q12H  tamsulosin, 0.4 mg, Oral, Nightly        Continuous Infusions:       PRN Meds:  •  acetaminophen  •  dextrose  •  dextrose  •  glucagon (human recombinant)  •  oxyCODONE-acetaminophen  •  sodium chloride  •  [COMPLETED] Insert peripheral IV **AND** sodium chloride  •  sodium chloride    Intake/Output:    Intake/Output Summary (Last 24 hours) at 2020 1656  Last data filed at 2020 1313  Gross per 24 hour   Intake 1938.66 ml   Output 50 ml   Net 1888.66 ml         Exam:    tMax 24 hrs: Temp (24hrs), Av.1 °F (36.2 °C), Min:96.8 °F (36 °C), Max:97.4 °F (36.3 °C)    Vitals Ranges:   Temp:  [96.8 °F (36 °C)-97.4 °F (36.3 °C)] 96.8 °F (36 °C)  Heart Rate:  [60-63] 61  Resp:  [16-18] 18  BP: (132-187)/(78-94) 187/94    BP (!) 187/94 (BP Location: Left arm, Patient Position: Lying) Comment: RN NOTIFIED  Pulse 61   Temp 96.8 °F (36 °C) (Oral)   Resp 18   Ht 172.7 cm (67.99\")   Wt 58.2 kg (128 lb 4.8 oz) Comment: wihtout pumps on bottom of bed  SpO2 97%   BMI 19.51 " kg/m²     General: Patient is awake, however not connected. No respiratory distress  Oropharynx: Contusion and abrasion of the left aspect of patient's chin.  Poor dentition.  Laceration intraorally at the frenulum, healing.  Contusion of left sided lip.  Neck: Supple, symmetrical, trachea midline, no adenopathy;              thyroid:  no enlargement/tenderness/nodules;              no carotid bruit or JVD  Cardiovascular: Normal rate, regular rhythm and intact distal pulses.              Exam reveals no gallop and no friction rub. No murmur heard  Pulmonary: Clear to auscultation bilaterally, respirations unlabored.               No rhonchi, wheezing or rales.   Abdominal: Soft, nontender, bowel sounds active all four quadrants,     no masses, no hepatomegaly, no splenomegaly.  Cuellar in place  Extremities: Normal, atraumatic, able to move upper extremity, no much movement of the lower extremity.  Pulses: 2 + symmetric all extremities  Neurological: Patient is awake, however no connected. No new focal sensorimotor deficit.    Skin: Skin color, texture, normal. Turgor is decreased. No rashes or lesions       Data Review:    Results from last 7 days   Lab Units 11/18/20  0442 11/17/20  0433 11/14/20  0532   WBC 10*3/mm3 10.34 13.80* 8.36   HEMOGLOBIN g/dL 9.9* 10.2* 10.9*   HEMATOCRIT % 29.4* 30.1* 31.4*   PLATELETS 10*3/mm3 268 276 252       Results from last 7 days   Lab Units 11/18/20  0442 11/17/20  0433 11/16/20  0508   SODIUM mmol/L 137 135* 133*   POTASSIUM mmol/L 3.9 4.1 4.2   CHLORIDE mmol/L 103 100 97*   CO2 mmol/L 26.0 27.7 26.7   BUN mg/dL 33* 28* 19   CREATININE mg/dL 0.70* 0.89 0.59*   CALCIUM mg/dL 8.4* 8.6 9.1   GLUCOSE mg/dL 77 92 92                 Lab Results   Lab Value Date/Time    TROPONINT 0.019 05/23/2020 0921    TROPONINT <0.010 08/30/2019 0443    TROPONINT <0.010 08/28/2019 1308    TROPONINT <0.010 08/19/2019 1727    TROPONINT <0.010 08/19/2019 1538    TROPONINT <0.010 06/25/2019 1211     TROPONINT <0.010 06/25/2019 1502    TROPONINT <0.010 06/22/2019 0028    TROPONINT <0.010 06/21/2019 1846       Microbiology Results (last 10 days)     Procedure Component Value - Date/Time    Urine Culture - Urine, Urine, Catheter In/Out [[id number]]  (Abnormal)  (Susceptibility) Collected: 11/09/20 2102    Lab Status: Final result Specimen: Urine, Catheter In/Out Updated: 11/12/20 0142     Urine Culture >100,000 CFU/mL Enterococcus faecalis    Susceptibility      Enterococcus faecalis     ALLISON     Ampicillin Susceptible     Levofloxacin Susceptible     Nitrofurantoin Susceptible     Tetracycline Resistant     Vancomycin Susceptible                    COVID PRE-OP / PRE-PROCEDURE SCREENING ORDER (NO ISOLATION) - Swab, Nasopharynx [[id number]]  (Normal) Collected: 11/08/20 2333    Lab Status: Final result Specimen: Swab from Nasopharynx Updated: 11/09/20 0053    Narrative:      The following orders were created for panel order COVID PRE-OP / PRE-PROCEDURE SCREENING ORDER (NO ISOLATION) - Swab, Nasopharynx.  Procedure                               Abnormality         Status                     ---------                               -----------         ------                     Respiratory Panel PCR w/...[[id number]]  Normal              Final result                 Please view results for these tests on the individual orders.    Respiratory Panel PCR w/COVID-19(SARS-CoV-2) [facility] In-House, NP Swab in UTM/VTM, 3-4 HR TAT - Swab, Nasopharynx [[id number]]  (Normal) Collected: 11/08/20 2333    Lab Status: Final result Specimen: Swab from Nasopharynx Updated: 11/09/20 0053     ADENOVIRUS, PCR Not Detected     Coronavirus 229E Not Detected     Coronavirus HKU1 Not Detected     Coronavirus NL63 Not Detected     Coronavirus OC43 Not Detected     COVID19 Not Detected     Human Metapneumovirus Not Detected     Human Rhinovirus/Enterovirus Not Detected     Influenza A PCR Not Detected     Influenza B PCR Not  Detected     Parainfluenza Virus 1 Not Detected     Parainfluenza Virus 2 Not Detected     Parainfluenza Virus 3 Not Detected     Parainfluenza Virus 4 Not Detected     RSV, PCR Not Detected     Bordetella pertussis pcr Not Detected     Bordetella parapertussis PCR Not Detected     Chlamydophila pneumoniae PCR Not Detected     Mycoplasma pneumo by PCR Not Detected    Narrative:      Fact sheet for providers: https://docs.[employer]/wp-content/uploads/FJN6312-7801-IP3.1-EUA-Provider-Fact-Sheet-3.pdf    Fact sheet for patients: https://docs.[employer]/wp-content/uploads/STC7544-6645-CX2.1-EUA-Patient-Fact-Sheet-1.pdf           Imaging Results (Last 7 Days)     Procedure Component Value Units Date/Time    CT Head Without Contrast [[id number]] Collected: 11/18/20 0809     Updated: 11/18/20 1010    Narrative:      NONCONTRAST HEAD CT 11/18/2020     CLINICAL HISTORY: Mental status changes, persistent, worsening.     TECHNIQUE: Spiral CT images were obtained from the base of skull to the  vertex without intravenous contrast. Images were reformatted and  submitted in 3 mm thick axial CT section with brain algorithm, 2 mm  thick sagittal and coronal reconstructions were performed and submitted  in brain algorithm.     FINDINGS: This is correlated to prior noncontrast head CT from [facility] just 10 days ago on 11/08/2020. There are extensive  areas of chronic infarction in the right middle cerebral artery  territory with a 5.3 x 3.6 cm old lateral right frontal infarct  extending inferiorly into the anterior right insular cortex, as well as  medially into the anterior right corona radiata region and this is in  the distribution of superior division right middle cerebral artery  territory, and there is an 8.7 x 3.3 x 5 cm old infarct extending  inferior lateral right parietal lobe, posterior lateral right temporal  lobe, and throughout the lateral right occipital lobe in the  distribution  temporoparietooccipital branches of the inferior-posterior  division right middle cerebral artery territory. There is 6 x 3.5 cm old  mid to lateral right temporal lobe infarct in the right middle cerebral  artery territory. There are patchy and confluent areas of low-density in  the periventricular and subcortical white matter of cerebral  hemispheres, consistent with moderate small vessel disease. There is a  12 x 6 mm old lacunar infarct in the anterior to mid left corona radiata  region. There is a small 1.5 cm old posterior inferior right occipital  infarct in the right PCA territory. There is mild cerebral atrophy. The  ventricles are normal in size. There is no midline shift and no  extra-axial fluid collections are identified, and there is no evidence  of acute intracranial hemorrhage. The calvarium and skull base are  normal in appearance. Paranasal sinuses are clear. There is partial  opacification of the right mastoid air cells with fluid and small amount  of fluid in the posterior inferior left mastoid air cells. Calcified  plaques are present in the cavernous internal carotid arteries  bilaterally.       Impression:      1. No significant change when compared to head CT 10 days ago on  11/08/2020.  2. There is moderate small vessel disease in the cerebral white matter  and there is 12 x 6 mm old lacunar infarct in the anterior to mid left  corona radiata region.  3. There are extensive areas of chronic infarction in the right middle  cerebral artery territory as described above. There is additional 1.5 cm  old posterior inferior right occipital infarct in the right PCA  territory. There is partial opacification of the right mastoid air cells  with fluid and small amount of fluid in left mastoid air cells. The  remainder of the head CT is within normal limits with no acute  intracranial abnormality seen to account for the new mental status  changes. If there remains acute neurological symptoms, an MRI of  the  brain could be obtained for more complete assessment     Radiation dose reduction techniques were utilized, including automated  exposure control and exposure modulation based on body size.  .     This report was finalized on 11/18/2020 10:07 AM by Dr. Paulino Guillen M.D.       XR Chest 1 View [[id number]] Collected: 11/17/20 1126     Updated: 11/17/20 1130    Narrative:      XR CHEST 1 VW-     Clinical: Altered mental status     COMPARISON 05/23/2020     FINDINGS: Transvenous pacemaker has been placed within the interim.  Position is satisfactory. Cardiac size within normal limits. No  mediastinal abnormality has developed. Old granulomatous disease right  lung. No effusion, edema or acute airspace disease has developed within  the interim.     CONCLUSION: Transvenous pacemaker has been placed since 05/23/2020. No  acute cardiovascular or pulmonary process is demonstrated.     This report was finalized on 11/17/2020 11:27 AM by Dr. Luan Huerat M.D.               Assessment:        Malignant hypertensive urgency    Severe malnutrition (CMS/HCC)    Dysphagia  Urinary tract infection per Enterococcus faecalis  Dehydration  Chronic worsening  bradycardia  Face contusion  Probably concussion  Vascular dementia with visual disturbance  COPD  Diabetes mellitus  Hypertension  Late effects of CVA  Persistent hyponatremia  SIADH  Metabolic encephalopathy    Plan:    Mental status may be little better, more awake, however not connected.  CT scan noted for review.  We will ask neuro to see patient.  Aspirin suppository  Pacemaker working okay  Has completed antibiotic for Enterococcus faecalis UTI  Sodium improving  Adjust insulin as needed, blood sugar in the low side  DVT/stress ulcer prophylaxis  Labs in       George Ware MD  11/18/2020

## 2020-11-18 NOTE — PROGRESS NOTES
"   LOS: 10 days     Chief Complaint/ Reason for encounter: Hyponatremia   Chief Complaint   Patient presents with   • Lip Laceration   • Fall     Subjective   This is the first encounter I have had where the patient responded verbally back to me. He told me hello and his name today. Bp stable today. Still not taking PO well    Medical history reviewed:  Lip Laceration    Fall        Subjective    History taken from: Patient and chart    Vital Signs  Temp:  [97.1 °F (36.2 °C)-97.4 °F (36.3 °C)] 97.4 °F (36.3 °C)  Heart Rate:  [60-63] 63  Resp:  [12-16] 16  BP: (108-143)/(68-82) 143/82       Wt Readings from Last 1 Encounters:   11/13/20 0550 58.2 kg (128 lb 4.8 oz)   11/09/20 0057 55.8 kg (123 lb)   11/08/20 1850 65.8 kg (145 lb)       Objective:  Vital signs: (most recent): Blood pressure 143/82, pulse 63, temperature 97.4 °F (36.3 °C), temperature source Axillary, resp. rate 16, height 172.7 cm (67.99\"), weight 58.2 kg (128 lb 4.8 oz), SpO2 94 %.              Objective:  General Appearance:  Comfortable, malnourished and chronically ill-appearing, in no acute distress and not in pain.  Awake, alert, oriented  HEENT: Mucous membranes moist, no injury, oropharynx clear  Lungs:  Normal effort and normal respiratory rate.  Breath sounds clear to auscultation.  No  respiratory distress.  No rales, decreased breath sounds or rhonchi.    Heart: NSR now with Pacemaker.  Regular rhythm.  S1 normal.  No murmur.   Abdomen: Abdomen is soft.  Bowel sounds are normal, no abdominal tenderness.  There is no rebound or guarding  Extremities: Normal range of motion.  No edema of bilateral lower extremities, distal pulses intact  Neurological: No focal motor or sensory deficits, pupils reactive  Skin:  Warm and dry.  No rash or cyanosis.       Results Review:    Intake/Output:     Intake/Output Summary (Last 24 hours) at 11/18/2020 1518  Last data filed at 11/18/2020 1042  Gross per 24 hour   Intake 1938.66 ml   Output 50 ml   Net " 1888.66 ml         DATA:  Radiology and Labs:  The following labs independently reviewed by me. Additional labs ordered for tomorrow a.m.  Interval notes, chart personally reviewed by me.   Imaging reviewed: CT head unremarkable for acute findings, CXR unremarkable for acute findings  New problems include inadequate PO intake resulting, palliative care to follow outpatient    Risk/ complexity of medical care/ medical decision making moderate due to poor intake, family decision to not proceed with TF, hospice is following outpatient     Labs:   Recent Results (from the past 24 hour(s))   POC Glucose Once    Collection Time: 11/17/20  4:33 PM    Specimen: Blood   Result Value Ref Range    Glucose 129 70 - 130 mg/dL   Urinalysis With Culture If Indicated - Urine, Catheter In/Out    Collection Time: 11/17/20  6:43 PM    Specimen: Urine, Catheter In/Out   Result Value Ref Range    Color, UA Dark Yellow (A) Yellow, Straw    Appearance, UA Clear Clear    pH, UA <=5.0 5.0 - 8.0    Specific Gravity, UA >=1.030 1.005 - 1.030    Glucose, UA Negative Negative    Ketones, UA 15 mg/dL (1+) (A) Negative    Bilirubin, UA Small (1+) (A) Negative    Blood, UA Moderate (2+) (A) Negative    Protein, UA Trace (A) Negative    Leuk Esterase, UA Negative Negative    Nitrite, UA Negative Negative    Urobilinogen, UA 1.0 E.U./dL 0.2 - 1.0 E.U./dL   Urinalysis, Microscopic Only - Urine, Catheter In/Out    Collection Time: 11/17/20  6:43 PM    Specimen: Urine, Catheter In/Out   Result Value Ref Range    RBC, UA 13-20 (A) None Seen, 0-2 /HPF    WBC, UA 0-2 None Seen, 0-2 /HPF    Bacteria, UA Trace (A) None Seen /HPF    Squamous Epithelial Cells, UA 0-2 None Seen, 0-2 /HPF    Hyaline Casts, UA 0-2 None Seen /LPF    Methodology Manual Light Microscopy    POC Glucose Once    Collection Time: 11/17/20  8:32 PM    Specimen: Blood   Result Value Ref Range    Glucose 110 70 - 130 mg/dL   Renal Function Panel    Collection Time: 11/18/20  4:42 AM     Specimen: Blood   Result Value Ref Range    Glucose 77 65 - 99 mg/dL    BUN 33 (H) 8 - 23 mg/dL    Creatinine 0.70 (L) 0.76 - 1.27 mg/dL    Sodium 137 136 - 145 mmol/L    Potassium 3.9 3.5 - 5.2 mmol/L    Chloride 103 98 - 107 mmol/L    CO2 26.0 22.0 - 29.0 mmol/L    Calcium 8.4 (L) 8.6 - 10.5 mg/dL    Albumin 2.90 (L) 3.50 - 5.20 g/dL    Phosphorus 3.1 2.5 - 4.5 mg/dL    Anion Gap 8.0 5.0 - 15.0 mmol/L    BUN/Creatinine Ratio 47.1 (H) 7.0 - 25.0    eGFR Non African Amer 114 >60 mL/min/1.73   CBC Auto Differential    Collection Time: 11/18/20  4:42 AM    Specimen: Blood   Result Value Ref Range    WBC 10.34 3.40 - 10.80 10*3/mm3    RBC 3.07 (L) 4.14 - 5.80 10*6/mm3    Hemoglobin 9.9 (L) 13.0 - 17.7 g/dL    Hematocrit 29.4 (L) 37.5 - 51.0 %    MCV 95.8 79.0 - 97.0 fL    MCH 32.2 26.6 - 33.0 pg    MCHC 33.7 31.5 - 35.7 g/dL    RDW 11.8 (L) 12.3 - 15.4 %    RDW-SD 40.8 37.0 - 54.0 fl    MPV 9.5 6.0 - 12.0 fL    Platelets 268 140 - 450 10*3/mm3    Neutrophil % 61.6 42.7 - 76.0 %    Lymphocyte % 26.5 19.6 - 45.3 %    Monocyte % 10.3 5.0 - 12.0 %    Eosinophil % 0.7 0.3 - 6.2 %    Basophil % 0.4 0.0 - 1.5 %    Immature Grans % 0.5 0.0 - 0.5 %    Neutrophils, Absolute 6.37 1.70 - 7.00 10*3/mm3    Lymphocytes, Absolute 2.74 0.70 - 3.10 10*3/mm3    Monocytes, Absolute 1.07 (H) 0.10 - 0.90 10*3/mm3    Eosinophils, Absolute 0.07 0.00 - 0.40 10*3/mm3    Basophils, Absolute 0.04 0.00 - 0.20 10*3/mm3    Immature Grans, Absolute 0.05 0.00 - 0.05 10*3/mm3    nRBC 0.0 0.0 - 0.2 /100 WBC   POC Glucose Once    Collection Time: 11/18/20  7:27 AM    Specimen: Blood   Result Value Ref Range    Glucose 77 70 - 130 mg/dL   POC Glucose Once    Collection Time: 11/18/20 11:35 AM    Specimen: Blood   Result Value Ref Range    Glucose 83 70 - 130 mg/dL       Radiology:  Imaging Results (Last 24 Hours)     Procedure Component Value Units Date/Time    CT Head Without Contrast [[id number]] Collected: 11/18/20 0809     Updated: 11/18/20 1010     Narrative:      NONCONTRAST HEAD CT 11/18/2020     CLINICAL HISTORY: Mental status changes, persistent, worsening.     TECHNIQUE: Spiral CT images were obtained from the base of skull to the  vertex without intravenous contrast. Images were reformatted and  submitted in 3 mm thick axial CT section with brain algorithm, 2 mm  thick sagittal and coronal reconstructions were performed and submitted  in brain algorithm.     FINDINGS: This is correlated to prior noncontrast head CT from [facility] just 10 days ago on 11/08/2020. There are extensive  areas of chronic infarction in the right middle cerebral artery  territory with a 5.3 x 3.6 cm old lateral right frontal infarct  extending inferiorly into the anterior right insular cortex, as well as  medially into the anterior right corona radiata region and this is in  the distribution of superior division right middle cerebral artery  territory, and there is an 8.7 x 3.3 x 5 cm old infarct extending  inferior lateral right parietal lobe, posterior lateral right temporal  lobe, and throughout the lateral right occipital lobe in the  distribution temporoparietooccipital branches of the inferior-posterior  division right middle cerebral artery territory. There is 6 x 3.5 cm old  mid to lateral right temporal lobe infarct in the right middle cerebral  artery territory. There are patchy and confluent areas of low-density in  the periventricular and subcortical white matter of cerebral  hemispheres, consistent with moderate small vessel disease. There is a  12 x 6 mm old lacunar infarct in the anterior to mid left corona radiata  region. There is a small 1.5 cm old posterior inferior right occipital  infarct in the right PCA territory. There is mild cerebral atrophy. The  ventricles are normal in size. There is no midline shift and no  extra-axial fluid collections are identified, and there is no evidence  of acute intracranial hemorrhage. The calvarium and  skull base are  normal in appearance. Paranasal sinuses are clear. There is partial  opacification of the right mastoid air cells with fluid and small amount  of fluid in the posterior inferior left mastoid air cells. Calcified  plaques are present in the cavernous internal carotid arteries  bilaterally.       Impression:      1. No significant change when compared to head CT 10 days ago on  11/08/2020.  2. There is moderate small vessel disease in the cerebral white matter  and there is 12 x 6 mm old lacunar infarct in the anterior to mid left  corona radiata region.  3. There are extensive areas of chronic infarction in the right middle  cerebral artery territory as described above. There is additional 1.5 cm  old posterior inferior right occipital infarct in the right PCA  territory. There is partial opacification of the right mastoid air cells  with fluid and small amount of fluid in left mastoid air cells. The  remainder of the head CT is within normal limits with no acute  intracranial abnormality seen to account for the new mental status  changes. If there remains acute neurological symptoms, an MRI of the  brain could be obtained for more complete assessment     Radiation dose reduction techniques were utilized, including automated  exposure control and exposure modulation based on body size.  .     This report was finalized on 11/18/2020 10:07 AM by Dr. Paulino Guillen M.D.                Medications have been reviewed:  Current Facility-Administered Medications   Medication Dose Route Frequency Provider Last Rate Last Admin   • acetaminophen (TYLENOL) tablet 650 mg  650 mg Oral Q6H PRN Abhishek Zaldivar MD   650 mg at 11/11/20 2138   • aspirin EC tablet 81 mg  81 mg Oral Daily Abhishek Zaldivar MD   81 mg at 11/17/20 0919   • atorvastatin (LIPITOR) tablet 20 mg  20 mg Oral Daily Abhishek Zaldivar MD   20 mg at 11/17/20 0919   • bisacodyl (DULCOLAX) suppository 10 mg  10 mg Rectal Daily Apple,  Susy Wilson MD   10 mg at 11/17/20 1232   • dextrose (D50W) 25 g/ 50mL Intravenous Solution 25 g  25 g Intravenous Q15 Min PRN Abhishek Zaldivar MD       • dextrose (GLUTOSE) oral gel 15 g  15 g Oral Q15 Min PRN Abhishek Zaldivar MD       • divalproex (DEPAKOTE) DR tablet 125 mg  125 mg Oral TID George Ware MD       • glucagon (human recombinant) (GLUCAGEN DIAGNOSTIC) injection 1 mg  1 mg Subcutaneous Q15 Min PRN Abhishek Zaldivar MD       • insulin lispro (humaLOG) injection 0-7 Units  0-7 Units Subcutaneous TID AC Abhishek Zaldivar MD       • metoprolol succinate XL (TOPROL-XL) 24 hr tablet 25 mg  25 mg Oral Daily Abhishek Zaldivar MD   25 mg at 11/16/20 0933   • OXcarbazepine (TRILEPTAL) tablet 600 mg  600 mg Oral Q12H Abhishek Zaldivar MD   600 mg at 11/17/20 0919   • oxyCODONE-acetaminophen (PERCOCET) 5-325 MG per tablet 1 tablet  1 tablet Oral Q4H PRN Abhishek Zaldivar MD   1 tablet at 11/16/20 2025   • sennosides-docusate (PERICOLACE) 8.6-50 MG per tablet 2 tablet  2 tablet Oral Nightly Stingl, Susy Wilson MD   2 tablet at 11/16/20 2025   • sodium chloride 0.9 % flush 10 mL  10 mL Intravenous PRN Abhishek Zaldivar MD   10 mL at 11/10/20 2018   • sodium chloride 0.9 % flush 10 mL  10 mL Intravenous PRN Abhishek Zaldivar MD   10 mL at 11/13/20 0926   • sodium chloride 0.9 % flush 10 mL  10 mL Intravenous PRN Abhishek Zaldivar MD       • sodium chloride 0.9 % flush 3 mL  3 mL Intravenous Q12H Abhishek Zaldivar MD   3 mL at 11/16/20 2026   • sodium chloride 0.9 % infusion  100 mL/hr Intravenous Continuous PrestDov dash APRN 100 mL/hr at 11/18/20 1042 100 mL/hr at 11/18/20 1042   • tamsulosin (FLOMAX) 24 hr capsule 0.4 mg  0.4 mg Oral Nightly Abhishek Zaldivar MD   0.4 mg at 11/16/20 2025       ASSESSMENT:   Hyponatremia, improved  Volume depletion, improved  UTI, resolved  DM  Swallowing impairment  New ongoing, severe malnutrition   New ongoing, FTT  S/P  pacemaker       PLAN:   Na and blood pressure back to baseline after IVF  Poor PO status   Speech therapy consult noted  Palliative note acknowledge, DNR  Hospice to follow outpatient   D/C IVF  Okay to d/c from renal standpoint when okay with other providers  Will continue to hold norvasc for now, follow trend  Repeat labs in AM  Continues off Na JOCELINE Segovia  [facility]   Office phone number: [phone]  Answering service phone number: [phone]    11/18/20  15:18 EST

## 2020-11-19 PROBLEM — E87.0 HYPERNATREMIA: Status: ACTIVE | Noted: 2020-11-19

## 2020-11-19 PROBLEM — E86.0 DEHYDRATION: Status: ACTIVE | Noted: 2020-11-19

## 2020-11-19 PROBLEM — R62.7 FTT (FAILURE TO THRIVE) IN ADULT: Status: ACTIVE | Noted: 2020-11-19

## 2020-11-19 PROBLEM — E86.9 VOLUME DEPLETION: Status: ACTIVE | Noted: 2020-11-19

## 2020-11-19 PROBLEM — F01.518 VASCULAR DEMENTIA WITH BEHAVIOR DISTURBANCE (HCC): Status: ACTIVE | Noted: 2019-08-25

## 2020-11-19 LAB
ALBUMIN SERPL-MCNC: 3.1 G/DL (ref 3.5–5.2)
ANION GAP SERPL CALCULATED.3IONS-SCNC: 9.2 MMOL/L (ref 5–15)
B PARAPERT DNA SPEC QL NAA+PROBE: NOT DETECTED
B PERT DNA SPEC QL NAA+PROBE: NOT DETECTED
BUN SERPL-MCNC: 24 MG/DL (ref 8–23)
BUN/CREAT SERPL: 48 (ref 7–25)
C PNEUM DNA NPH QL NAA+NON-PROBE: NOT DETECTED
CALCIUM SPEC-SCNC: 8.6 MG/DL (ref 8.6–10.5)
CHLORIDE SERPL-SCNC: 104 MMOL/L (ref 98–107)
CO2 SERPL-SCNC: 26.8 MMOL/L (ref 22–29)
CREAT SERPL-MCNC: 0.5 MG/DL (ref 0.76–1.27)
DEPRECATED RDW RBC AUTO: 40 FL (ref 37–54)
ERYTHROCYTE [DISTWIDTH] IN BLOOD BY AUTOMATED COUNT: 11.8 % (ref 12.3–15.4)
FLUAV SUBTYP SPEC NAA+PROBE: NOT DETECTED
FLUBV RNA ISLT QL NAA+PROBE: NOT DETECTED
GFR SERPL CREATININE-BSD FRML MDRD: >150 ML/MIN/1.73
GLUCOSE BLDC GLUCOMTR-MCNC: 116 MG/DL (ref 70–130)
GLUCOSE BLDC GLUCOMTR-MCNC: 142 MG/DL (ref 70–130)
GLUCOSE BLDC GLUCOMTR-MCNC: 153 MG/DL (ref 70–130)
GLUCOSE BLDC GLUCOMTR-MCNC: 88 MG/DL (ref 70–130)
GLUCOSE SERPL-MCNC: 86 MG/DL (ref 65–99)
HADV DNA SPEC NAA+PROBE: NOT DETECTED
HCOV 229E RNA SPEC QL NAA+PROBE: NOT DETECTED
HCOV HKU1 RNA SPEC QL NAA+PROBE: NOT DETECTED
HCOV NL63 RNA SPEC QL NAA+PROBE: NOT DETECTED
HCOV OC43 RNA SPEC QL NAA+PROBE: NOT DETECTED
HCT VFR BLD AUTO: 29.3 % (ref 37.5–51)
HGB BLD-MCNC: 10.2 G/DL (ref 13–17.7)
HMPV RNA NPH QL NAA+NON-PROBE: NOT DETECTED
HPIV1 RNA SPEC QL NAA+PROBE: NOT DETECTED
HPIV2 RNA SPEC QL NAA+PROBE: NOT DETECTED
HPIV3 RNA NPH QL NAA+PROBE: NOT DETECTED
HPIV4 P GENE NPH QL NAA+PROBE: NOT DETECTED
M PNEUMO IGG SER IA-ACNC: NOT DETECTED
MCH RBC QN AUTO: 32.9 PG (ref 26.6–33)
MCHC RBC AUTO-ENTMCNC: 34.8 G/DL (ref 31.5–35.7)
MCV RBC AUTO: 94.5 FL (ref 79–97)
PHOSPHATE SERPL-MCNC: 2.7 MG/DL (ref 2.5–4.5)
PLATELET # BLD AUTO: 362 10*3/MM3 (ref 140–450)
PMV BLD AUTO: 8.9 FL (ref 6–12)
POTASSIUM SERPL-SCNC: 3.7 MMOL/L (ref 3.5–5.2)
RBC # BLD AUTO: 3.1 10*6/MM3 (ref 4.14–5.8)
RHINOVIRUS RNA SPEC NAA+PROBE: NOT DETECTED
RSV RNA NPH QL NAA+NON-PROBE: NOT DETECTED
SARS-COV-2 RNA NPH QL NAA+NON-PROBE: NOT DETECTED
SODIUM SERPL-SCNC: 140 MMOL/L (ref 136–145)
VASOPRESSIN SERPL-MCNC: <0.8 PG/ML (ref 0–4.7)
WBC # BLD AUTO: 9.63 10*3/MM3 (ref 3.4–10.8)

## 2020-11-19 PROCEDURE — 25010000002 LEVETIRACETAM IN NACL 0.82% 500 MG/100ML SOLUTION: Performed by: INTERNAL MEDICINE

## 2020-11-19 PROCEDURE — 0202U NFCT DS 22 TRGT SARS-COV-2: CPT | Performed by: INTERNAL MEDICINE

## 2020-11-19 PROCEDURE — 85027 COMPLETE CBC AUTOMATED: CPT | Performed by: INTERNAL MEDICINE

## 2020-11-19 PROCEDURE — 99222 1ST HOSP IP/OBS MODERATE 55: CPT | Performed by: PSYCHIATRY & NEUROLOGY

## 2020-11-19 PROCEDURE — 82962 GLUCOSE BLOOD TEST: CPT

## 2020-11-19 PROCEDURE — 92526 ORAL FUNCTION THERAPY: CPT | Performed by: SPEECH-LANGUAGE PATHOLOGIST

## 2020-11-19 PROCEDURE — 80069 RENAL FUNCTION PANEL: CPT | Performed by: INTERNAL MEDICINE

## 2020-11-19 RX ORDER — LEVETIRACETAM 500 MG/1
500 TABLET ORAL 2 TIMES DAILY
Status: DISCONTINUED | OUTPATIENT
Start: 2020-11-19 | End: 2020-11-20 | Stop reason: HOSPADM

## 2020-11-19 RX ORDER — AMLODIPINE BESYLATE 5 MG/1
5 TABLET ORAL
Status: DISCONTINUED | OUTPATIENT
Start: 2020-11-19 | End: 2020-11-20 | Stop reason: HOSPADM

## 2020-11-19 RX ORDER — SODIUM CHLORIDE 9 MG/ML
100 INJECTION, SOLUTION INTRAVENOUS CONTINUOUS
Status: DISCONTINUED | OUTPATIENT
Start: 2020-11-19 | End: 2020-11-19

## 2020-11-19 RX ORDER — DIVALPROEX SODIUM 500 MG/1
500 TABLET, DELAYED RELEASE ORAL 2 TIMES DAILY
Status: DISCONTINUED | OUTPATIENT
Start: 2020-11-19 | End: 2020-11-20 | Stop reason: HOSPADM

## 2020-11-19 RX ADMIN — DIVALPROEX SODIUM 500 MG: 500 TABLET, DELAYED RELEASE ORAL at 19:58

## 2020-11-19 RX ADMIN — DOCUSATE SODIUM 50MG AND SENNOSIDES 8.6MG 2 TABLET: 8.6; 5 TABLET, FILM COATED ORAL at 19:58

## 2020-11-19 RX ADMIN — LEVETIRACETAM 500 MG: 500 TABLET, FILM COATED ORAL at 19:57

## 2020-11-19 RX ADMIN — LEVETIRACETAM 500 MG: 5 INJECTION INTRAVENOUS at 09:07

## 2020-11-19 RX ADMIN — ACETAMINOPHEN 650 MG: 325 TABLET, FILM COATED ORAL at 09:36

## 2020-11-19 RX ADMIN — OXCARBAZEPINE 600 MG: 300 TABLET, FILM COATED ORAL at 11:47

## 2020-11-19 RX ADMIN — SODIUM CHLORIDE, PRESERVATIVE FREE 3 ML: 5 INJECTION INTRAVENOUS at 22:18

## 2020-11-19 RX ADMIN — TAMSULOSIN HYDROCHLORIDE 0.4 MG: 0.4 CAPSULE ORAL at 19:57

## 2020-11-19 RX ADMIN — AMLODIPINE BESYLATE 5 MG: 5 TABLET ORAL at 16:52

## 2020-11-19 RX ADMIN — ATORVASTATIN CALCIUM 20 MG: 20 TABLET, FILM COATED ORAL at 11:47

## 2020-11-19 RX ADMIN — SODIUM CHLORIDE 100 ML/HR: 9 INJECTION, SOLUTION INTRAVENOUS at 07:57

## 2020-11-19 RX ADMIN — BISACODYL 10 MG: 10 SUPPOSITORY RECTAL at 09:10

## 2020-11-19 RX ADMIN — METOPROLOL SUCCINATE 25 MG: 25 TABLET, EXTENDED RELEASE ORAL at 11:47

## 2020-11-19 NOTE — PLAN OF CARE
Goal Outcome Evaluation:  Plan of Care Reviewed With: patient  Progress: improving  Outcome Summary: pt tolerated repeat swallow eval, diet is Pureed with NTL, must be feed, unable to move arms, likes his legs bent and moves them side to side, turned every 2, oriented to person and hospital, pacemaker site slightly swollen and bruised, probable return to facility tomorrow

## 2020-11-19 NOTE — PLAN OF CARE
Problem: Adult Inpatient Plan of Care  Goal: Plan of Care Review  Outcome: Ongoing, Not Progressing  Flowsheets  Taken 11/19/2020 0408 by Shaina Gill RN  Progress: no change  Outcome Summary: Pt will moan yes or no and ask for milk. Oral care w/ sponges/chap stick. Repositioning often. No oral meds given. 3 side rails up/bed alarm on, will kick legs over the rails and slide down in bed. Chest incision c/d/i.  continuing to monitor.

## 2020-11-19 NOTE — CONSULTS
Patient Identification:  NAME:  Trever Petit  Age:  64 y.o.   Sex:  male   :  1955   MRN:  [id number]       Chief complaint: He does not have 1, reason for the consult abnormal CT head    History of present illness this patient is a 64-year-old white male with a past history of stroke.  He has come to the hospital with some lethargy and confusion at least several days in duration associated symptoms his sodium was 127 on 2020.  Urine osmolality 279 he has been in pain with passive range of motion of the extremities CT of the head by my independent eyeball review shows encephalomalacia bilaterally but much worse in the right hemisphere compared to the left.  Modifying factors none context a patient who is probably going to go to hospice when he gets back to his nursing facility.  Note he is on Trileptal which can lower the sodium      Past medical history:  Past Medical History:   Diagnosis Date   • JENI (acute kidney injury) (CMS/[facility]) 2019   • Anxiety    • Arthritis    • Bradycardia    • Carpal bone fracture    • Carpal tunnel syndrome    • Cerebrovascular accident (CMS/[facility])    • Constipation    • COPD (chronic obstructive pulmonary disease) (CMS/[facility])    • Coronary artery disease    • Diabetes mellitus (CMS/[facility])    • E. coli UTI (urinary tract infection) 2019   • Elevated cholesterol    • Hepatitis B    • Hepatitis-C    • Hypertension    • Myocardial infarction (CMS/[facility])    • Neuropathy    • Seizure-like activity (CMS/[facility]) 2019   • Spinal stenosis    • Stroke (CMS/[facility])    • Type 2 diabetes mellitus, without long-term current use of insulin (CMS/[facility]) 2019   • Vascular dementia (CMS/[facility]) 2019       Past surgical history:  Past Surgical History:   Procedure Laterality Date   • CARDIAC CATHETERIZATION N/A 2019    Procedure: Left Heart Cath;  Surgeon: Chen Aguilar MD;  Location: [facility] CATH INVASIVE LOCATION;  Service: Cardiovascular   • CARDIAC CATHETERIZATION N/A  2019    Procedure: Left ventriculography;  Surgeon: Chen Aguilar MD;  Location:  [facility] CATH INVASIVE LOCATION;  Service: Cardiovascular   • CARDIAC CATHETERIZATION N/A 2019    Procedure: Coronary angiography;  Surgeon: Chen Aguilar MD;  Location:  [facility] CATH INVASIVE LOCATION;  Service: Cardiovascular   • CARDIAC ELECTROPHYSIOLOGY PROCEDURE N/A 2020    Procedure: Pacemaker DC [place];  Surgeon: Abhishek Zaldivar MD;  Location:  [facility] CATH INVASIVE LOCATION;  Service: Cardiology;  Laterality: N/A;  [employer]   • CARPAL TUNNEL RELEASE     • CERVICAL FUSION     • NECK SURGERY         Allergies:  Clonidine derivatives, Hydrochlorothiazide, Metformin, and Sitagliptin    Home medications:  Medications Prior to Admission   Medication Sig Dispense Refill Last Dose   • acetaminophen (TYLENOL) 325 MG tablet Take 650 mg by mouth Every 6 (Six) Hours As Needed for Mild Pain .      • aspirin 81 MG EC tablet Take 81 mg by mouth Daily.      • atorvastatin (LIPITOR) 20 MG tablet Take 1 tablet by mouth Daily. 30 tablet 5    • calcium carbonate-cholecalciferol 500-400 MG-UNIT tablet tablet Take  by mouth Daily.      • [] cefTRIAXone Sodium (ROCEPHIN IJ) Inject 1 g as directed Daily. Last dose on the       • divalproex (DEPAKOTE) 250 MG DR tablet Take 250 mg by mouth 3 (Three) Times a Day.      • docusate sodium 100 MG capsule Take 100 mg by mouth 2 (Two) Times a Day.      • lisinopril (PRINIVIL,ZESTRIL) 40 MG tablet Take 1 tablet by mouth Daily. (Patient taking differently: Take 5 mg by mouth Every Night.) 30 tablet 5    • melatonin 5 MG tablet tablet Take 5 mg by mouth.      • OLANZapine (zyPREXA) 2.5 MG tablet Take 2.5 mg by mouth Every Night.      • OXcarbazepine (TRILEPTAL) 600 MG tablet Take 600 mg by mouth 2 (Two) Times a Day.      • PARoxetine (PAXIL) 10 MG tablet Take 1 tablet by mouth Every Morning.      • polyethylene glycol (MIRALAX) 17 g packet Take 17 g by mouth  Daily.      • tamsulosin (FLOMAX) 0.4 MG capsule 24 hr capsule Take 1 capsule by mouth Every Night.      • trihexyphenidyl (ARTANE) 2 MG tablet Take 2 mg by mouth 2 (Two) Times a Day With Meals.      • amLODIPine (NORVASC) 5 MG tablet Take 1 tablet by mouth Daily.      • escitalopram (LEXAPRO) 10 MG tablet Take 10 mg by mouth Daily.           Hospital medications:  amLODIPine, 5 mg, Oral, Q24H  aspirin, 300 mg, Rectal, Daily  atorvastatin, 20 mg, Oral, Daily  bisacodyl, 10 mg, Rectal, Daily  insulin lispro, 0-7 Units, Subcutaneous, TID AC  levETIRAcetam, 500 mg, Intravenous, Q12H  metoprolol succinate XL, 25 mg, Oral, Daily  OXcarbazepine, 600 mg, Oral, Q12H  senna-docusate sodium, 2 tablet, Oral, Nightly  sodium chloride, 3 mL, Intravenous, Q12H  tamsulosin, 0.4 mg, Oral, Nightly         •  acetaminophen  •  dextrose  •  dextrose  •  glucagon (human recombinant)  •  oxyCODONE-acetaminophen  •  sodium chloride  •  [COMPLETED] Insert peripheral IV **AND** sodium chloride  •  sodium chloride    Family history:  History reviewed. No pertinent family history.    Social history:  Social History     Tobacco Use   • Smoking status: Never Smoker   Substance Use Topics   • Alcohol use: No     Frequency: Never   • Drug use: Yes     Types: Marijuana       Review of systems:    He cannot tell me anything no family is present I reviewed the chart fully    Objective:  Vitals Ranges:   Temp:  [96.8 °F (36 °C)-97.4 °F (36.3 °C)] 96.9 °F (36.1 °C)  Heart Rate:  [] 60  Resp:  [16-18] 16  BP: (140-177)/(79-96) 171/93      Physical Exam:  Lethargic cannot tell me where he is speech is slightly dysarthric but he is not aphasic fund of knowledge poor attention span concentration poor recent remote memory cannot be tested language as noted in no distress he has pain with passive range of motion particularly of the left arm.  Pupils 2 with slight reaction bilaterally eyes appear to be conjugate he might have a slight left gaze  preference but I cannot get him to follow my thumb to either side decreased left nasolabial fold and his tongue deviates to the left this is all the cranial nerves he did seem to move the right arm better than the left arm and when I passively moved it he had extreme pain and moaned.  He would not wiggle his toes there is is bradykinesia and rigidity reflexes trace throughout symmetrical toes probably downgoing bilaterally sensation coordination station and gait completely impossible in this patient heart is regular without murmur next rigid without bruits extremities no clubbing cyanosis edema he did count 3 fingers at 6 feet    Results review:   I reviewed the patient's new clinical results.    Data review:  Lab Results (last 24 hours)     Procedure Component Value Units Date/Time    ADH [[id number]] Collected: 11/12/20 1242    Specimen: Blood from Arm, Right Updated: 11/19/20 1611     ADH <0.8 pg/mL      Comment: Results of this test are labeled for research purposes only by the  assay's . The performance characteristics of this assay  have not been established by the . The result should not  be used for treatment or for diagnostic purposes without confirmation  of the diagnosis by another medically established diagnostic product  or procedure. The performance characteristics were determined by  [employer].       Narrative:      Performed at:   - [address]  [id number]  : Cora Coulter MD, Phone:  [phone]    POC Glucose Once [[id number]]  (Normal) Collected: 11/19/20 1201    Specimen: Blood Updated: 11/19/20 1210     Glucose 116 mg/dL     POC Glucose Once [[id number]]  (Normal) Collected: 11/19/20 0810    Specimen: Blood Updated: 11/19/20 0812     Glucose 88 mg/dL     Renal Function Panel [[id number]]  (Abnormal) Collected: 11/19/20 0659    Specimen: Blood Updated: 11/19/20 0802     Glucose 86 mg/dL      BUN 24 mg/dL      Creatinine 0.50  "mg/dL      Sodium 140 mmol/L      Potassium 3.7 mmol/L      Chloride 104 mmol/L      CO2 26.8 mmol/L      Calcium 8.6 mg/dL      Albumin 3.10 g/dL      Phosphorus 2.7 mg/dL      Anion Gap 9.2 mmol/L      BUN/Creatinine Ratio 48.0     eGFR Non African Amer >150 mL/min/1.73     Narrative:      GFR Normal >60  Chronic Kidney Disease <60  Kidney Failure <15      CBC (No Diff) [[id number]]  (Abnormal) Collected: 11/19/20 0659    Specimen: Blood Updated: 11/19/20 0742     WBC 9.63 10*3/mm3      RBC 3.10 10*6/mm3      Hemoglobin 10.2 g/dL      Hematocrit 29.3 %      MCV 94.5 fL      MCH 32.9 pg      MCHC 34.8 g/dL      RDW 11.8 %      RDW-SD 40.0 fl      MPV 8.9 fL      Platelets 362 10*3/mm3     POC Glucose Once [[id number]]  (Normal) Collected: 11/18/20 1941    Specimen: Blood Updated: 11/18/20 1942     Glucose 86 mg/dL            Imaging:  Imaging Results (Last 24 Hours)     ** No results found for the last 24 hours. **         PPE worn at all times washed before washed up afterwards disposed of everything properly was not within 6 feet of him for more than a few minutes during my exam no aerosols used  Assessment and Plan:       Malignant hypertensive urgency    Severe malnutrition (CMS/HCC)    Dysphagia    Hypernatremia    Volume depletion    FTT (failure to thrive) in adult    Dehydration  This patient has dysarthria evidence of a left hemiparesis and evidence of previous stroke certainly the scan by my independent eyeball review shows right hemisphere greater than left hemispheric old areas of stroke I am not convinced he has suffered a new stroke TIA or seizure at this time.  He has significant metabolic encephalopathy and post stroke dementia/vascular dementia with some behavioral changes.  This would be related to his previous history of strokes as well as his hypertension.  I asked him how he feels and he says \"shitty\".  I asked him if he wants anything and he says \"milkshake\".  We will get him the milkshake.  I " believe hospice is going to be a good choice for this gentleman as he has pain with even passive range of motion today on my exam I will follow-up tomorrow but there is not a lot extra I would add neurologically at this point for this gentleman.  Lastly he has low sodium that is almost certainly related to the use of the Trileptal I will discontinue this and continue the low-dose Keppra which I think will be better tolerated and will not cause hyponatremia.  We will continue the Depakote for now as well and plan on increasing from a total of 750 mg p.o. daily to a total of 8000 mg p.o.daily.  He will be on Keppra 500 mg p.o. twice daily and Depakote  mg p.o. twice daily from now on      Nikhil Li MD  11/19/20  16:27 EST

## 2020-11-19 NOTE — THERAPY RE-EVALUATION
Acute Care - Speech Language Pathology   Swallow Treatment Note [facility]     Patient Name: Trever Petit  : 1955  MRN: [id number]  Today's Date: 2020               Admit Date: 2020    Visit Dx:     ICD-10-CM ICD-9-CM   1. Malignant hypertensive urgency  I16.0 401.0   2. Bradycardia chronic  R00.1 427.89   3. Contusion of face, initial encounter  S00.83XA 920   4. Dementia with behavioral disturbance, unspecified dementia type (CMS/[facility])  F03.91 294.21     Patient Active Problem List   Diagnosis   • Hypertensive emergency   • Essential hypertension   • Stroke (CMS/[facility])   • Hypoglycemia   • Vascular dementia (CMS/[facility])   • Type 2 diabetes mellitus, without long-term current use of insulin (CMS/[facility])   • Constipation   • Leukocytosis   • Fever   • Closed fracture of multiple ribs of left side   • Pleural effusion   • Left lower lobe pneumonia   • Cystitis   • Seizure-like activity (CMS/[facility])   • E. coli UTI (urinary tract infection)   • Fall   • C1 cervical fracture (CMS/[facility])   • Bradycardia   • Malignant hypertensive urgency   • Severe malnutrition (CMS/[facility])   • Dysphagia     Past Medical History:   Diagnosis Date   • JENI (acute kidney injury) (CMS/[facility]) 2019   • Anxiety    • Arthritis    • Bradycardia    • Carpal bone fracture    • Carpal tunnel syndrome    • Cerebrovascular accident (CMS/[facility])    • Constipation    • COPD (chronic obstructive pulmonary disease) (CMS/[facility])    • Coronary artery disease    • Diabetes mellitus (CMS/[facility])    • E. coli UTI (urinary tract infection) 2019   • Elevated cholesterol    • Hepatitis B    • Hepatitis-C    • Hypertension    • Myocardial infarction (CMS/[facility])    • Neuropathy    • Seizure-like activity (CMS/[facility]) 2019   • Spinal stenosis    • Stroke (CMS/[facility])    • Type 2 diabetes mellitus, without long-term current use of insulin (CMS/HCC) 2019   • Vascular dementia (CMS/HCC) 2019     Past Surgical History:   Procedure Laterality Date   •  CARDIAC CATHETERIZATION N/A 6/25/2019    Procedure: Left Heart Cath;  Surgeon: Chen Aguilar MD;  Location:  [facility];  Service: Cardiovascular   • CARDIAC CATHETERIZATION N/A 6/25/2019    Procedure: Left ventriculography;  Surgeon: Chen Aguilar MD;  Location:  [facility];  Service: Cardiovascular   • CARDIAC CATHETERIZATION N/A 6/25/2019    Procedure: Coronary angiography;  Surgeon: Chen Aguilar MD;  Location:  [facility];  Service: Cardiovascular   • CARDIAC ELECTROPHYSIOLOGY PROCEDURE N/A 11/13/2020    Procedure: Pacemaker DC [place];  Surgeon: Abhishek Zaldivar MD;  Location:  [facility];  Service: Cardiology;  Laterality: N/A;  [employer]   • CARPAL TUNNEL RELEASE     • CERVICAL FUSION     • NECK SURGERY     Patient was not wearing a face mask during this therapy encounter. Therapist used appropriate personal protective equipment including mask, eye protection and gloves.  Mask used was standard procedure mask. Appropriate PPE was worn during the entire therapy session. Hand hygiene was completed before and after therapy session. Patient is not in enhanced droplet precautions.                SWALLOW EVALUATION (last 72 hours)      SLP Adult Swallow Evaluation     Row Name 11/19/20 1100                   Rehab Evaluation    Document Type  evaluation  -KA        Subjective Information  no complaints  -KA        Patient Observations  alert;cooperative  -KA        Care Plan Review  evaluation/treatment results reviewed  -KA        Patient Effort  excellent  -KA        Symptoms Noted During/After Treatment  none  -KA        Symptoms Noted, Comment  New orders received to re-evaluate swallow. Multiple re-evaluations have been completed his hospitalization. On 11/17 re-evaluation recommended puree and NTL. Patient was made NPO that evening due to increase lethargy and concern with safety of PO. Chest XRAY 11/17=No  airspace disease has developed   -KA           General Information    Patient Profile Reviewed  yes  -KA        Pertinent History Of Current Problem  New orders received to re-evaluate swallow. Multiple re-evaluations have been completed his hospitalization. On 11/17 re-evaluation recommended puree and NTL. Patient was made NPO that evening due to increase lethargy and concern with safety of PO. Chest XRAY 11/17=No airspace disease has developed   -KA        Current Method of Nutrition  NPO  -KA        Precautions/Limitations, Vision  vision impairment, bilaterally  -KA        Precautions/Limitations, Hearing  WFL  -KA        Prior Level of Function-Communication  cognitive-linguistic impairment;motor speech impairment  -KA        Prior Level of Function-Swallowing  puree;thin liquids;other (see comments)  -KA        Plans/Goals Discussed with  patient;agreed upon  -KA        Barriers to Rehab  cognitive status;previous functional deficit  -KA           Pain Scale: Numbers Pre/Post-Treatment    Pretreatment Pain Rating  0/10 - no pain  -KA        Posttreatment Pain Rating  0/10 - no pain  -KA           Oral Motor Structure and Function    Dentition Assessment  missing teeth  -KA        Secretion Management  WNL/WFL  -KA        Mucosal Quality  moist, healthy  -KA           Oral Musculature and Cranial Nerve Assessment    Oral Labial or Buccal Impairment, Detail, Cranial Nerve VII (Facial):  left labial droop  -KA        Lingual Impairment, Detail. Cranial Nerves IX, XII (Glossopharyngeal and Hypoglossal)  reduced lingual ROM;reduced strength  -KA           General Eating/Swallowing Observations    Eating/Swallowing Skills  fed by SLP  -KA        Positioning During Eating  upright in bed  -KA        Utensils Used  spoon;cup  -KA        Consistencies Trialed  thin liquids;nectar/syrup-thick liquids;pureed  -KA           Clinical Swallow Eval    Clinical Swallow Evaluation Summary  Re-evaluation completed pt  demonstrated no overt s/s of pen/asp with puree and NTL. Coughing demonstrated on cup and spoonful sips of thin liquids, suspect mistiming. Piecemeal deglutition with puree with increase in a-p transit at times  -KA           Clinical Impression    SLP Swallowing Diagnosis  suspected pharyngeal dysphagia;oral dysphagia  -KA        Functional Impact  risk of aspiration/pneumonia  -KA        Rehab Potential/Prognosis, Swallowing  good, to achieve stated therapy goals  -KA        Swallow Criteria for Skilled Therapeutic Interventions Met  demonstrates skilled criteria  -KA           Recommendations    Therapy Frequency (Swallow)  PRN  -KA        Predicted Duration Therapy Intervention (Days)  until discharge  -KA        SLP Diet Recommendation  nectar thick liquids;puree  -KA        Recommended Precautions and Strategies  upright posture during/after eating;small bites of food and sips of liquid;1:1 supervision  -KA        Oral Care Recommendations  Oral Care before breakfast, after meals and PRN  -KA        SLP Rec. for Method of Medication Administration  meds whole;with pudding or applesauce;as tolerated  -KA        Monitor for Signs of Aspiration  yes;notify SLP if any concerns  -KA        Anticipated Discharge Disposition (SLP)  extended care facility  -          User Key  (r) = Recorded By, (t) = Taken By, (c) = Cosigned By    Initials Name Effective Dates    Stevie Gomes MA,[facility]-SLP 06/08/18 -           EDUCATION  The patient has been educated in the following areas:   Dysphagia (Swallowing Impairment).    SLP Recommendation and Plan  SLP Swallowing Diagnosis: suspected pharyngeal dysphagia, oral dysphagia  SLP Diet Recommendation: nectar thick liquids, puree  Recommended Precautions and Strategies: upright posture during/after eating, small bites of food and sips of liquid, 1:1 supervision  SLP Rec. for Method of Medication Administration: meds whole, with pudding or applesauce, as tolerated     Monitor  for Signs of Aspiration: yes, notify SLP if any concerns     Swallow Criteria for Skilled Therapeutic Interventions Met: demonstrates skilled criteria  Anticipated Discharge Disposition (SLP): extended care facility  Rehab Potential/Prognosis, Swallowing: good, to achieve stated therapy goals  Therapy Frequency (Swallow): PRN  Predicted Duration Therapy Intervention (Days): until discharge                         Plan of Care Reviewed With: patient  Outcome Summary: Swallow re-evaluation completed recommend puree and NTL with 1:1 supervision and small bites and sips, slow rate and meds whole with puree    SLP GOALS     Row Name 11/17/20 0800             Oral Nutrition/Hydration Goal 1 (SLP)    Oral Nutrition/Hydration Goal 1, SLP  Tolerate least restrictive diet  -      Time Frame (Oral Nutrition/Hydration Goal 1, SLP)  by discharge  -      Barriers (Oral Nutrition/Hydration Goal 1, SLP)  Progress: Patient seen for re evaluation of swallow function. Eyes open this date. Pt continues to be nonverbal during assessment. RN reported nightshift indicated some coughing with PO. Anterior loss with nectar via cup from L. Pt unable to suck from straw this date, but no overt s/s of asp with nectar via spoon/cup. Oral holding and slow AP transit with puree. Laryngeal elevation reduced. D/t report of poor intake, coughing with PO, and continuous mastication with solids; SLP recs downgrade to puree, continue nectar. No straws. Meds crushed or whole in puree. Total feed. Oral care was completed following assessment.  -      Progress/Outcomes (Oral Nutrition/Hydration Goal 1, SLP)  progress slower than expected  -        User Key  (r) = Recorded By, (t) = Taken By, (c) = Cosigned By    Initials Name Provider Type    Monet Hays MS CCC-SLP Speech and Language Pathologist           SLP Outcome Measures (last 72 hours)      SLP Outcome Measures     Row Name 11/19/20 1200             SLP Outcome Measures    Outcome  Measure Used?  Adult NOMS  -         Adult FCM Scores    FCM Chosen  Swallowing  -      Swallowing FCM Score  4  -        User Key  (r) = Recorded By, (t) = Taken By, (c) = Cosigned By    Initials Name Effective Dates    Stevie Gomes MA,[facility]-SLP 06/08/18 -            Time Calculation:   Time Calculation- SLP     Row Name 11/19/20 1211             Time Calculation- SLP    SLP Start Time  1015  -      SLP Received On  11/19/20  -        User Key  (r) = Recorded By, (t) = Taken By, (c) = Cosigned By    Initials Name Provider Type    Stevie Gomes MA,CCC-SLP Speech and Language Pathologist          Therapy Charges for Today     Code Description Service Date Service Provider Modifiers Qty    [id number] HC ST TREATMENT SWALLOW 4 11/19/2020 Stevie Love MA,CCC-SLP GN 1               Stevie Love MA,[facility]-SLP  11/19/2020

## 2020-11-19 NOTE — PROGRESS NOTES
Continued Stay Note  [facility]     Patient Name: Trever Petit  MRN: [id number]  Today's Date: 11/19/2020    Admit Date: 11/8/2020    Discharge Plan     Row Name 11/19/20 1829       Plan    Plan  [facility]--[facility] level of care with [facility] to follow? Packet in office. Will need a Covid test within 24-48 hours of discharge. RN notified to obtain order.     Patient/Family in Agreement with Plan  yes    Plan Comments  Discussed case with Debbie/palliative care RN. She spoke with Les/patient's brother regarding goals of care and plan of care. Says facility mentioned hospice care months ago. Family would like a [facility] evaluation for care and services at the nursing home. Await order for [facility] to see and discuss with family. Call placed to Lindsay/Monty Lazaro. Per Lindsay patient can return to [place] with [facility] to follow. CCP will need to arrange ambulance transport. Packet in office. Continue to follow.        Discharge Codes    No documentation.       Expected Discharge Date and Time     Expected Discharge Date Expected Discharge Time    Nov 18, 2020             Tonia Tang RN

## 2020-11-19 NOTE — PROGRESS NOTES
DAILY PROGRESS NOTE  [facility], [facility]    2020    Patient Identification:  Name: Trever Petit  Age: 64 y.o.  Sex: male  :  1955  MRN: [id number]           Primary Care Physician: George Ware MD    Subjective:    Interval History:    Patient is awake today.  Trying to speak.  Vital signs stable, afebrile.  Saturation on room air is 98%.  Speech therapy to see patient again today.  Neurology to see patient today  Blood sugar okay  Sodium 140, creatinine 0.50, hemoglobin 10.2.    ROS:     There is no report for nausea, vomiting, diarrhea, constipation, chest pain, shortness of breath.    Objective:    Scheduled Meds:  aspirin, 300 mg, Rectal, Daily  atorvastatin, 20 mg, Oral, Daily  bisacodyl, 10 mg, Rectal, Daily  insulin lispro, 0-7 Units, Subcutaneous, TID AC  levETIRAcetam, 500 mg, Intravenous, Q12H  metoprolol succinate XL, 25 mg, Oral, Daily  OXcarbazepine, 600 mg, Oral, Q12H  senna-docusate sodium, 2 tablet, Oral, Nightly  sodium chloride, 3 mL, Intravenous, Q12H  tamsulosin, 0.4 mg, Oral, Nightly        Continuous Infusions:  sodium chloride, 100 mL/hr        PRN Meds:  •  acetaminophen  •  dextrose  •  dextrose  •  glucagon (human recombinant)  •  oxyCODONE-acetaminophen  •  sodium chloride  •  [COMPLETED] Insert peripheral IV **AND** sodium chloride  •  sodium chloride    Intake/Output:    Intake/Output Summary (Last 24 hours) at 2020 0744  Last data filed at 2020 1736  Gross per 24 hour   Intake 2498.66 ml   Output --   Net 2498.66 ml         Exam:    tMax 24 hrs: Temp (24hrs), Av.1 °F (36.2 °C), Min:96.8 °F (36 °C), Max:97.4 °F (36.3 °C)    Vitals Ranges:   Temp:  [96.8 °F (36 °C)-97.4 °F (36.3 °C)] 97.4 °F (36.3 °C)  Heart Rate:  [] 61  Resp:  [16-18] 18  BP: (140-187)/(79-96) 140/79    /79 (BP Location: Right arm, Patient Position: Lying)   Pulse 61   Temp 97.4 °F (36.3 °C) (Oral)   Resp 18   Ht 172.7 cm  "(67.99\")   Wt 58.2 kg (128 lb 4.8 oz) Comment: wihtout pumps on bottom of bed  SpO2 98%   BMI 19.51 kg/m²     General: Patient is awake, however not connected. No respiratory distress  Oropharynx: Contusion and abrasion of the left aspect of patient's chin.  Poor dentition.  Laceration intraorally at the frenulum, healing.  Contusion of left sided lip.  Neck: Supple, symmetrical, trachea midline, no adenopathy;              thyroid:  no enlargement/tenderness/nodules;              no carotid bruit or JVD  Cardiovascular: Normal rate, regular rhythm and intact distal pulses.              Exam reveals no gallop and no friction rub. No murmur heard  Pulmonary: Clear to auscultation bilaterally, respirations unlabored.               No rhonchi, wheezing or rales.   Abdominal: Soft, nontender, bowel sounds active all four quadrants,     no masses, no hepatomegaly, no splenomegaly.  Cuellar in place  Extremities: Normal, atraumatic, able to move upper extremity, no much movement of the lower extremity.  Pulses: 2 + symmetric all extremities  Neurological: Patient is awake, however no connected. No new focal sensorimotor deficit.    Skin: Skin color, texture, normal. Turgor is decreased. No rashes or lesions       Data Review:    Results from last 7 days   Lab Units 11/19/20  0659 11/18/20 0442 11/17/20  0433   WBC 10*3/mm3 9.63 10.34 13.80*   HEMOGLOBIN g/dL 10.2* 9.9* 10.2*   HEMATOCRIT % 29.3* 29.4* 30.1*   PLATELETS 10*3/mm3 362 268 276       Results from last 7 days   Lab Units 11/18/20  0442 11/17/20  0433 11/16/20  0508   SODIUM mmol/L 137 135* 133*   POTASSIUM mmol/L 3.9 4.1 4.2   CHLORIDE mmol/L 103 100 97*   CO2 mmol/L 26.0 27.7 26.7   BUN mg/dL 33* 28* 19   CREATININE mg/dL 0.70* 0.89 0.59*   CALCIUM mg/dL 8.4* 8.6 9.1   GLUCOSE mg/dL 77 92 92                 Lab Results   Lab Value Date/Time    TROPONINT 0.019 05/23/2020 0921    TROPONINT <0.010 08/30/2019 0443    TROPONINT <0.010 08/28/2019 1308    TROPONINT " <0.010 08/19/2019 1727    TROPONINT <0.010 08/19/2019 1538    TROPONINT <0.010 06/25/2019 1703    TROPONINT <0.010 06/25/2019 1502    TROPONINT <0.010 06/22/2019 0028    TROPONINT <0.010 06/21/2019 1846       Microbiology Results (last 10 days)     Procedure Component Value - Date/Time    Urine Culture - Urine, Urine, Catheter In/Out [[id number]]  (Abnormal)  (Susceptibility) Collected: 11/09/20 2102    Lab Status: Final result Specimen: Urine, Catheter In/Out Updated: 11/12/20 0142     Urine Culture >100,000 CFU/mL Enterococcus faecalis    Susceptibility      Enterococcus faecalis     ALLISON     Ampicillin Susceptible     Levofloxacin Susceptible     Nitrofurantoin Susceptible     Tetracycline Resistant     Vancomycin Susceptible                           Imaging Results (Last 7 Days)     Procedure Component Value Units Date/Time    CT Head Without Contrast [[id number]] Collected: 11/18/20 0809     Updated: 11/18/20 1010    Narrative:      NONCONTRAST HEAD CT 11/18/2020     CLINICAL HISTORY: Mental status changes, persistent, worsening.     TECHNIQUE: Spiral CT images were obtained from the base of skull to the  vertex without intravenous contrast. Images were reformatted and  submitted in 3 mm thick axial CT section with brain algorithm, 2 mm  thick sagittal and coronal reconstructions were performed and submitted  in brain algorithm.     FINDINGS: This is correlated to prior noncontrast head CT from [facility] just 10 days ago on 11/08/2020. There are extensive  areas of chronic infarction in the right middle cerebral artery  territory with a 5.3 x 3.6 cm old lateral right frontal infarct  extending inferiorly into the anterior right insular cortex, as well as  medially into the anterior right corona radiata region and this is in  the distribution of superior division right middle cerebral artery  territory, and there is an 8.7 x 3.3 x 5 cm old infarct extending  inferior lateral right parietal lobe,  posterior lateral right temporal  lobe, and throughout the lateral right occipital lobe in the  distribution temporoparietooccipital branches of the inferior-posterior  division right middle cerebral artery territory. There is 6 x 3.5 cm old  mid to lateral right temporal lobe infarct in the right middle cerebral  artery territory. There are patchy and confluent areas of low-density in  the periventricular and subcortical white matter of cerebral  hemispheres, consistent with moderate small vessel disease. There is a  12 x 6 mm old lacunar infarct in the anterior to mid left corona radiata  region. There is a small 1.5 cm old posterior inferior right occipital  infarct in the right PCA territory. There is mild cerebral atrophy. The  ventricles are normal in size. There is no midline shift and no  extra-axial fluid collections are identified, and there is no evidence  of acute intracranial hemorrhage. The calvarium and skull base are  normal in appearance. Paranasal sinuses are clear. There is partial  opacification of the right mastoid air cells with fluid and small amount  of fluid in the posterior inferior left mastoid air cells. Calcified  plaques are present in the cavernous internal carotid arteries  bilaterally.       Impression:      1. No significant change when compared to head CT 10 days ago on  11/08/2020.  2. There is moderate small vessel disease in the cerebral white matter  and there is 12 x 6 mm old lacunar infarct in the anterior to mid left  corona radiata region.  3. There are extensive areas of chronic infarction in the right middle  cerebral artery territory as described above. There is additional 1.5 cm  old posterior inferior right occipital infarct in the right PCA  territory. There is partial opacification of the right mastoid air cells  with fluid and small amount of fluid in left mastoid air cells. The  remainder of the head CT is within normal limits with no acute  intracranial  abnormality seen to account for the new mental status  changes. If there remains acute neurological symptoms, an MRI of the  brain could be obtained for more complete assessment     Radiation dose reduction techniques were utilized, including automated  exposure control and exposure modulation based on body size.  .     This report was finalized on 11/18/2020 10:07 AM by Dr. Paulino Guillen M.D.       XR Chest 1 View [[id number]] Collected: 11/17/20 1126     Updated: 11/17/20 1130    Narrative:      XR CHEST 1 VW-     Clinical: Altered mental status     COMPARISON 05/23/2020     FINDINGS: Transvenous pacemaker has been placed within the interim.  Position is satisfactory. Cardiac size within normal limits. No  mediastinal abnormality has developed. Old granulomatous disease right  lung. No effusion, edema or acute airspace disease has developed within  the interim.     CONCLUSION: Transvenous pacemaker has been placed since 05/23/2020. No  acute cardiovascular or pulmonary process is demonstrated.     This report was finalized on 11/17/2020 11:27 AM by Dr. Luan Huerta M.D.               Assessment:        Malignant hypertensive urgency    Severe malnutrition (CMS/HCC)    Dysphagia  Urinary tract infection per Enterococcus faecalis  Dehydration  Chronic worsening  bradycardia  Face contusion  Probably concussion  Vascular dementia with behavioral  disturbance  COPD  Diabetes mellitus  Hypertension  Late effects of CVA  Persistent hyponatremia  SIADH  Metabolic encephalopathy    Plan:    Speech to see patient today  Neurology to see patient today  More awake today, trying to speak  Continue current regimen otherwise.  Aspirin suppository  Pacemaker working okay  Has completed antibiotic for Enterococcus faecalis UTI  Sodium back to normal.  Adjust insulin as needed, blood sugar in the low side  DVT/stress ulcer prophylaxis  Labs in       George Ware MD  11/19/2020

## 2020-11-19 NOTE — PROGRESS NOTES
"   LOS: 11 days     Chief Complaint/ Reason for encounter: Hyponatremia   Chief Complaint   Patient presents with   • Lip Laceration   • Fall     Subjective    Speech consult, noted  Requiring puree and someone to assist feed him  He is talkative today and asking for matt cramerw   He states he is drinking some but needs to be drinking more  Plans to d/c back to NH with [facility] to follow    Medical history reviewed:  Lip Laceration    Fall        Subjective    History taken from: Patient and chart    Vital Signs  Temp:  [96.8 °F (36 °C)-97.4 °F (36.3 °C)] 97 °F (36.1 °C)  Heart Rate:  [] 60  Resp:  [16-18] 16  BP: (140-187)/(79-96) 177/94       Wt Readings from Last 1 Encounters:   11/13/20 0550 58.2 kg (128 lb 4.8 oz)   11/09/20 0057 55.8 kg (123 lb)   11/08/20 1850 65.8 kg (145 lb)       Objective:  Vital signs: (most recent): Blood pressure 177/94, pulse 60, temperature 97 °F (36.1 °C), temperature source Oral, resp. rate 16, height 172.7 cm (67.99\"), weight 58.2 kg (128 lb 4.8 oz), SpO2 98 %.              Objective:  General Appearance:  Comfortable, malnourished and chronically ill-appearing, in no acute distress and not in pain.  Awake, alert,   HEENT: Mucous membranes moist, no injury, oropharynx clear  Lungs:  Normal effort and normal respiratory rate.  Breath sounds clear to auscultation.  No  respiratory distress.  No rales, decreased breath sounds or rhonchi.    Heart: NSR now with Pacemaker.  Regular rhythm.  S1 normal.  No murmur.   Abdomen: Abdomen is soft.  Bowel sounds are normal, no abdominal tenderness.  There is no rebound or guarding  Extremities: Normal range of motion.  No edema of bilateral lower extremities, distal pulses intact  Neurological: No focal motor or sensory deficits, pupils reactive  Skin:  Warm and dry.  No rash or cyanosis.       Results Review:    Intake/Output:     Intake/Output Summary (Last 24 hours) at 11/19/2020 1417  Last data filed at 11/19/2020 0849  Gross per 24 " hour   Intake 560 ml   Output --   Net 560 ml         DATA:  Radiology and Labs:  The following labs independently reviewed by me. Additional labs ordered for tomorrow a.m.  Interval notes, chart personally reviewed by me.   New problems include hypokalemia   Risk/ complexity of medical care/ medical decision making moderate due to poor intake, family decision to not proceed with TF, hospice is following outpatient     Labs:   Recent Results (from the past 24 hour(s))   POC Glucose Once    Collection Time: 11/18/20  4:20 PM    Specimen: Blood   Result Value Ref Range    Glucose 75 70 - 130 mg/dL   POC Glucose Once    Collection Time: 11/18/20  7:41 PM    Specimen: Blood   Result Value Ref Range    Glucose 86 70 - 130 mg/dL   Renal Function Panel    Collection Time: 11/19/20  6:59 AM    Specimen: Blood   Result Value Ref Range    Glucose 86 65 - 99 mg/dL    BUN 24 (H) 8 - 23 mg/dL    Creatinine 0.50 (L) 0.76 - 1.27 mg/dL    Sodium 140 136 - 145 mmol/L    Potassium 3.7 3.5 - 5.2 mmol/L    Chloride 104 98 - 107 mmol/L    CO2 26.8 22.0 - 29.0 mmol/L    Calcium 8.6 8.6 - 10.5 mg/dL    Albumin 3.10 (L) 3.50 - 5.20 g/dL    Phosphorus 2.7 2.5 - 4.5 mg/dL    Anion Gap 9.2 5.0 - 15.0 mmol/L    BUN/Creatinine Ratio 48.0 (H) 7.0 - 25.0    eGFR Non African Amer >150 >60 mL/min/1.73   CBC (No Diff)    Collection Time: 11/19/20  6:59 AM    Specimen: Blood   Result Value Ref Range    WBC 9.63 3.40 - 10.80 10*3/mm3    RBC 3.10 (L) 4.14 - 5.80 10*6/mm3    Hemoglobin 10.2 (L) 13.0 - 17.7 g/dL    Hematocrit 29.3 (L) 37.5 - 51.0 %    MCV 94.5 79.0 - 97.0 fL    MCH 32.9 26.6 - 33.0 pg    MCHC 34.8 31.5 - 35.7 g/dL    RDW 11.8 (L) 12.3 - 15.4 %    RDW-SD 40.0 37.0 - 54.0 fl    MPV 8.9 6.0 - 12.0 fL    Platelets 362 140 - 450 10*3/mm3   POC Glucose Once    Collection Time: 11/19/20  8:10 AM    Specimen: Blood   Result Value Ref Range    Glucose 88 70 - 130 mg/dL   POC Glucose Once    Collection Time: 11/19/20 12:01 PM    Specimen: Blood    Result Value Ref Range    Glucose 116 70 - 130 mg/dL       Radiology:  Imaging Results (Last 24 Hours)     ** No results found for the last 24 hours. **             Medications have been reviewed:  Current Facility-Administered Medications   Medication Dose Route Frequency Provider Last Rate Last Admin   • acetaminophen (TYLENOL) tablet 650 mg  650 mg Oral Q6H PRN Abhishek Zaldivar MD   650 mg at 11/19/20 0936   • aspirin suppository 300 mg  300 mg Rectal Daily George Ware MD   300 mg at 11/18/20 2026   • atorvastatin (LIPITOR) tablet 20 mg  20 mg Oral Daily Abhishek Zaldivar MD   20 mg at 11/19/20 1147   • bisacodyl (DULCOLAX) suppository 10 mg  10 mg Rectal Daily Susy Chiu MD   10 mg at 11/19/20 0910   • dextrose (D50W) 25 g/ 50mL Intravenous Solution 25 g  25 g Intravenous Q15 Min PRN Abhishek Zaldivar MD       • dextrose (GLUTOSE) oral gel 15 g  15 g Oral Q15 Min PRN Abhishek Zaldivar MD       • glucagon (human recombinant) (GLUCAGEN DIAGNOSTIC) injection 1 mg  1 mg Subcutaneous Q15 Min PRN Abhishek Zaldivar MD       • insulin lispro (humaLOG) injection 0-7 Units  0-7 Units Subcutaneous TID AC Abhishek Zaldivar MD       • levETIRAcetam in NaCl 0.82% (KEPPRA) IVPB 500 mg  500 mg Intravenous Q12H George Ware MD   500 mg at 11/19/20 0907   • metoprolol succinate XL (TOPROL-XL) 24 hr tablet 25 mg  25 mg Oral Daily Abhishek Zaldivar MD   25 mg at 11/19/20 1147   • OXcarbazepine (TRILEPTAL) tablet 600 mg  600 mg Oral Q12H Abhishek Zaldivar MD   600 mg at 11/19/20 1147   • oxyCODONE-acetaminophen (PERCOCET) 5-325 MG per tablet 1 tablet  1 tablet Oral Q4H PRN Abhishek Zaldivar MD   1 tablet at 11/16/20 2025   • sennosides-docusate (PERICOLACE) 8.6-50 MG per tablet 2 tablet  2 tablet Oral Nightly Susy Chiu MD   2 tablet at 11/16/20 2025   • sodium chloride 0.9 % flush 10 mL  10 mL Intravenous PRN Abhishek Zaldivar MD   10 mL at 11/10/20 2018   • sodium  chloride 0.9 % flush 10 mL  10 mL Intravenous PRN Abhishek Zaldivar MD   10 mL at 11/13/20 0926   • sodium chloride 0.9 % flush 10 mL  10 mL Intravenous PRN Abhishek Zaldivar MD       • sodium chloride 0.9 % flush 3 mL  3 mL Intravenous Q12H Abhishek Zaldivar MD   3 mL at 11/18/20 2241   • sodium chloride 0.9 % infusion  100 mL/hr Intravenous Continuous George Ware  mL/hr at 11/19/20 0757 100 mL/hr at 11/19/20 0757   • tamsulosin (FLOMAX) 24 hr capsule 0.4 mg  0.4 mg Oral Nightly Abhishek Zaldivar MD   0.4 mg at 11/16/20 2025       ASSESSMENT:   Hyponatremia, improved  New, Hypokalemia  Volume depletion, improved  UTI, resolved  DM  Swallowing impairment  New ongoing, severe malnutrition   New ongoing, FTT  S/P pacemaker       PLAN:   Pureed diet approved today  Ok to d/c IVF   Palliative note acknowledge, DNR  Hospice to follow outpatient   Okay to d/c from renal standpoint when okay with other providers  Will resume Norvasc  Repeat labs in AM    JOCELINE Bentley  [facility]   Office phone number: [phone]  Answering service phone number: [phone]    11/19/20  14:17 EST

## 2020-11-19 NOTE — PLAN OF CARE
Problem: Adult Inpatient Plan of Care  Goal: Plan of Care Review  11/19/2020 1209 by Stevie Love MA,CCC-SLP  Outcome: Ongoing, Progressing  Flowsheets (Taken 11/19/2020 1209)  Plan of Care Reviewed With: patient  Outcome Summary: Swallow re-evaluation completed recommend rosette and NTL with 1:1 supervision and small bites and sips, slow rate and meds whole with rosette

## 2020-11-19 NOTE — PROGRESS NOTES
"Physicians Statement of Medical Necessity for  Ambulance Transportation    GENERAL INFORMATION     Name: Trever Petit  YOB: 1955  Medicaid #: [id number]  Transport Date:                (Valid for round trips this date, or for scheduled repetitive trips for 60 days from the date signed below.)  Origin: [address]  Destination: [facility]  Is the Patient's stay covered under Medicare Part A (PPS/DRG?)No   Closest appropriate facility? Yes  If this a hosp-hosp transfer? No  Is this a hospice patient? Yes, Is this transport related to patient's terminal illness? Yes     MEDICAL NECESSITY QUESTIONAIRE    Ambulance Transportation is medically necessary only if other means of transportation are contraindicated or would be potentially harmful to the patient.  To meet this requirement, the patient must be either \"bed confined\" or suffer from a condition such that transport by means other than an ambulance is contraindicated by the patient's condition.  The following questions must be answered by the healthcare professional signing below for this form to be valid:     1) Describe the MEDICAL CONDITION (physical and/or mental) of this patient AT THE TIME OF AMBULANCE TRANSPORT that requires the patient to be transported in an ambulance, and why transport by other means is contraindicated by the patient's condition: Metabolic encephalopathy; Vascular dementia with visual disturbance; S/P CVA  Past Medical History:   Diagnosis Date   • JENI (acute kidney injury) (CMS/HCC) 9/25/2019   • Anxiety    • Arthritis    • Bradycardia    • Carpal bone fracture    • Carpal tunnel syndrome    • Cerebrovascular accident (CMS/[facility])    • Constipation    • COPD (chronic obstructive pulmonary disease) (CMS/[facility])    • Coronary artery disease    • Diabetes mellitus (CMS/[facility])    • E. coli UTI (urinary tract infection) 9/26/2019   • Elevated cholesterol    • Hepatitis B    • Hepatitis-C    • Hypertension    • Myocardial " "infarction (CMS/HCC)    • Neuropathy    • Seizure-like activity (CMS/[facility]) 9/25/2019   • Spinal stenosis    • Stroke (CMS/[facility])    • Type 2 diabetes mellitus, without long-term current use of insulin (CMS/HCC) 8/25/2019   • Vascular dementia (CMS/HCC) 8/25/2019      Past Surgical History:   Procedure Laterality Date   • CARDIAC CATHETERIZATION N/A 6/25/2019    Procedure: Left Heart Cath;  Surgeon: Chen Aguilar MD;  Location:  [facility];  Service: Cardiovascular   • CARDIAC CATHETERIZATION N/A 6/25/2019    Procedure: Left ventriculography;  Surgeon: Chen Aguilar MD;  Location:  [facility];  Service: Cardiovascular   • CARDIAC CATHETERIZATION N/A 6/25/2019    Procedure: Coronary angiography;  Surgeon: Chen Aguilar MD;  Location:  [facility];  Service: Cardiovascular   • CARDIAC ELECTROPHYSIOLOGY PROCEDURE N/A 11/13/2020    Procedure: Pacemaker DC [place];  Surgeon: Abhishek Zaldivar MD;  Location:  [facility];  Service: Cardiology;  Laterality: N/A;  [employer]   • CARPAL TUNNEL RELEASE     • CERVICAL FUSION     • NECK SURGERY        2) Is this patient \"bed confined\" as defined below?Yes    To be \"bed confined\" the patient must satisfy all three of the following criteria:  (1) unable to get up from bed without assistance; AND (2) unable to ambulate;  AND (3) unable to sit in a chair or wheelchair.  3) Can this patient safely be transported by car or wheelchair van (I.e., may safely sit during transport, without an attendant or monitoring?)No   4. In addition to completing questions 1-3 above, please check any of the following conditions that apply*:          *Note: supporting documentation for any boxes checked must be maintained in the patient's medical records Patient is confused, Unable to tolerate seated position for time needed to transport and Other Mod-max assist with transfers and mobility      SIGNATURE OF " PHYSICIAN OR OTHER AUTHORIZED HEALTHCARE PROFESSIONAL    I certify that the above information is true and correct based on my evaluation of this patient, and represent that the patient requires transport by ambulance and that other forms of transport are contraindicated.  I understand that this information will be used by the Centers for Medicare and Medicaid Services (CMS) to support the determiniation of medical necessity for ambulance services, and I represent that I have personal knowledge of the patient's condition at the time of transport.     X   If this box is checked, I also certify that the patient is physically or mentally incapable of signing the ambulance service's claim form and that the institution with which I am affiliated has furnished care, services or assistance to the patient.  My signature below is made on behalf of the patient pursuant to 42 .36(b)(4). In accordance with 42 .37, the specific reason(s) that the patient is physically or mentally incapable of signing the claim for is as follows:     Signature of Physician or Healthcare Professional      Tonia Tang RN, [facility] Date/Time:        (For Scheduled repetitive transport, this form is not valid for transports performed more than 60 days after this date).                                                                                                                                            --------------------------------------------------------------------------------------------  Printed Name and Credentials of Physician or Authorized Healthcare Professional     *Form must be signed by patient's attending physician for scheduled, repetitive transports,.  For non-repetitive ambulance transports, if unable to obtain the signature of the attending physician, any of the following may sign (please select below):     Physician  Clinical Nurse Specialist  Registered Nurse     Physician Assistant  Discharge Planner  Licensed  Practical Nurse     Nurse Practitioner  X

## 2020-11-20 VITALS
RESPIRATION RATE: 16 BRPM | BODY MASS INDEX: 19.45 KG/M2 | TEMPERATURE: 99.1 F | HEIGHT: 68 IN | OXYGEN SATURATION: 97 % | DIASTOLIC BLOOD PRESSURE: 78 MMHG | WEIGHT: 128.3 LBS | HEART RATE: 61 BPM | SYSTOLIC BLOOD PRESSURE: 139 MMHG

## 2020-11-20 LAB
ALBUMIN SERPL-MCNC: 2.8 G/DL (ref 3.5–5.2)
ANION GAP SERPL CALCULATED.3IONS-SCNC: 5.1 MMOL/L (ref 5–15)
BUN SERPL-MCNC: 15 MG/DL (ref 8–23)
BUN/CREAT SERPL: 38.5 (ref 7–25)
CALCIUM SPEC-SCNC: 8.1 MG/DL (ref 8.6–10.5)
CHLORIDE SERPL-SCNC: 102 MMOL/L (ref 98–107)
CO2 SERPL-SCNC: 27.9 MMOL/L (ref 22–29)
CREAT SERPL-MCNC: 0.39 MG/DL (ref 0.76–1.27)
DEPRECATED RDW RBC AUTO: 41.3 FL (ref 37–54)
ERYTHROCYTE [DISTWIDTH] IN BLOOD BY AUTOMATED COUNT: 11.8 % (ref 12.3–15.4)
GFR SERPL CREATININE-BSD FRML MDRD: >150 ML/MIN/1.73
GLUCOSE BLDC GLUCOMTR-MCNC: 116 MG/DL (ref 70–130)
GLUCOSE BLDC GLUCOMTR-MCNC: 123 MG/DL (ref 70–130)
GLUCOSE BLDC GLUCOMTR-MCNC: 149 MG/DL (ref 70–130)
GLUCOSE SERPL-MCNC: 148 MG/DL (ref 65–99)
HCT VFR BLD AUTO: 29.1 % (ref 37.5–51)
HGB BLD-MCNC: 9.8 G/DL (ref 13–17.7)
MCH RBC QN AUTO: 32.3 PG (ref 26.6–33)
MCHC RBC AUTO-ENTMCNC: 33.7 G/DL (ref 31.5–35.7)
MCV RBC AUTO: 96 FL (ref 79–97)
PHOSPHATE SERPL-MCNC: 1.8 MG/DL (ref 2.5–4.5)
PLATELET # BLD AUTO: 303 10*3/MM3 (ref 140–450)
PMV BLD AUTO: 8.9 FL (ref 6–12)
POTASSIUM SERPL-SCNC: 3.7 MMOL/L (ref 3.5–5.2)
RBC # BLD AUTO: 3.03 10*6/MM3 (ref 4.14–5.8)
SODIUM SERPL-SCNC: 135 MMOL/L (ref 136–145)
WBC # BLD AUTO: 10.36 10*3/MM3 (ref 3.4–10.8)

## 2020-11-20 PROCEDURE — 82962 GLUCOSE BLOOD TEST: CPT

## 2020-11-20 PROCEDURE — 85027 COMPLETE CBC AUTOMATED: CPT | Performed by: INTERNAL MEDICINE

## 2020-11-20 PROCEDURE — 80069 RENAL FUNCTION PANEL: CPT | Performed by: INTERNAL MEDICINE

## 2020-11-20 RX ORDER — AMOXICILLIN 250 MG
2 CAPSULE ORAL NIGHTLY
Qty: 60 TABLET | Refills: 2 | Status: SHIPPED | OUTPATIENT
Start: 2020-11-20

## 2020-11-20 RX ORDER — METOPROLOL SUCCINATE 25 MG/1
25 TABLET, EXTENDED RELEASE ORAL DAILY
Qty: 30 TABLET | Refills: 3 | Status: SHIPPED | OUTPATIENT
Start: 2020-11-20

## 2020-11-20 RX ORDER — LEVETIRACETAM 500 MG/1
500 TABLET ORAL 2 TIMES DAILY
Qty: 60 TABLET | Refills: 6 | Status: SHIPPED | OUTPATIENT
Start: 2020-11-20

## 2020-11-20 RX ORDER — DIVALPROEX SODIUM 500 MG/1
500 TABLET, DELAYED RELEASE ORAL 2 TIMES DAILY
Qty: 60 TABLET | Refills: 1 | Status: SHIPPED | OUTPATIENT
Start: 2020-11-20

## 2020-11-20 RX ORDER — OXYCODONE HYDROCHLORIDE AND ACETAMINOPHEN 5; 325 MG/1; MG/1
1 TABLET ORAL EVERY 8 HOURS PRN
Qty: 90 TABLET | Refills: 0 | Status: SHIPPED | OUTPATIENT
Start: 2020-11-20 | End: 2020-11-27

## 2020-11-20 RX ADMIN — SODIUM CHLORIDE, PRESERVATIVE FREE 3 ML: 5 INJECTION INTRAVENOUS at 09:16

## 2020-11-20 RX ADMIN — METOPROLOL SUCCINATE 25 MG: 25 TABLET, EXTENDED RELEASE ORAL at 09:15

## 2020-11-20 RX ADMIN — ATORVASTATIN CALCIUM 20 MG: 20 TABLET, FILM COATED ORAL at 09:15

## 2020-11-20 RX ADMIN — DIVALPROEX SODIUM 500 MG: 500 TABLET, DELAYED RELEASE ORAL at 09:16

## 2020-11-20 RX ADMIN — LEVETIRACETAM 500 MG: 500 TABLET, FILM COATED ORAL at 09:15

## 2020-11-20 RX ADMIN — AMLODIPINE BESYLATE 5 MG: 5 TABLET ORAL at 09:15

## 2020-11-20 RX ADMIN — POTASSIUM PHOSPHATE, MONOBASIC AND POTASSIUM PHOSPHATE, DIBASIC 30 MMOL: 224; 236 INJECTION, SOLUTION, CONCENTRATE INTRAVENOUS at 11:58

## 2020-11-20 NOTE — DISCHARGE SUMMARY
PHYSICIAN DISCHARGE SUMMARY  [facility], [facility]    Patient Identification:    Name: Trever Petit  Age: 64 y.o.  Sex: male  :  1955  MRN: [id number]    Primary Care Physician: George Ware MD    Admit date: 2020    Discharge date and time:2020    Discharged Condition: poor    Discharge Diagnoses:    Vascular dementia with behavior disturbance (CMS/HCC)    Malignant hypertensive urgency    Severe malnutrition (CMS/HCC)    Dysphagia    Hypernatremia    Volume depletion    FTT (failure to thrive) in adult    Dehydration           Hospital Course: Trever Petit  is a Patient is a 64-year-old gentleman, resident of nursing home.  Patient has history of COPD, coronary artery disease, diabetes mellitus, vascular dementia, stroke.  Patient fell right prior to admission, apparently he fell out of bed.  Patient was brought to the ER, in the ER, patient was found to have: Bradycardia, blood pressure of 199//99, sodium 131, potassium 4.1, WBC 7.5, hemoglobin 12.0, CT facial no evidence of acute intracranial abnormality.  CT of the neck shows chronic fracture of the left C1 ring with healing.  In the ER patient was given Cardene to treat elevated blood pressure, then patient was admitted for further management.  No urinalysis available.    Upon admission patient was placed on IV fluids, renal was consulted for hyponatremia.  Patient looks encephalopathic.  Probably due to hyponatremia.  Over the next few days patient became more awake, urine culture came back with gram-positive cocci so patient was treated properly.  It was felt that the cause of hyponatremia was SIADH, medications cause hyponatremia were discontinued.  Because of severe bradycardia patient underwent pacemaker placement on .  Bradycardia was much better after pacemaker, UTI was treated properly, however, patient was still encephalopathic despite infection being treated as  well as sodium being back to normal.  CT scan was done, neurology was consulted, their recommendation was to change seizure medications to Keppra, and to continue Depakote.  Palliative care was also involved and after discussion with patient's brother, who is patient's POA, it was felt that patient will benefit from his hospice evaluation for care and service to the nursing home.  Patient is a DO NOT RESUSCITATE, he will be going back to nursing home after being evaluated by hospice and been approved for hospice care services at the nursing home.    PMHX:   Past Medical History:   Diagnosis Date   • JENI (acute kidney injury) (CMS/[facility]) 9/25/2019   • Anxiety    • Arthritis    • Bradycardia    • Carpal bone fracture    • Carpal tunnel syndrome    • Cerebrovascular accident (CMS/[facility])    • Constipation    • COPD (chronic obstructive pulmonary disease) (CMS/[facility])    • Coronary artery disease    • Diabetes mellitus (CMS/[facility])    • E. coli UTI (urinary tract infection) 9/26/2019   • Elevated cholesterol    • Hepatitis B    • Hepatitis-C    • Hypertension    • Myocardial infarction (CMS/[facility])    • Neuropathy    • Seizure-like activity (CMS/HCC) 9/25/2019   • Spinal stenosis    • Stroke (CMS/[facility])    • Type 2 diabetes mellitus, without long-term current use of insulin (CMS/[facility]) 8/25/2019   • Vascular dementia (CMS/HCC) 8/25/2019     PSHX:   Past Surgical History:   Procedure Laterality Date   • CARDIAC CATHETERIZATION N/A 6/25/2019    Procedure: Left Heart Cath;  Surgeon: Chen Aguilar MD;  Location:  [facility];  Service: Cardiovascular   • CARDIAC CATHETERIZATION N/A 6/25/2019    Procedure: Left ventriculography;  Surgeon: Chen Aguilar MD;  Location:  [facility];  Service: Cardiovascular   • CARDIAC CATHETERIZATION N/A 6/25/2019    Procedure: Coronary angiography;  Surgeon: Chen Aguilar MD;  Location:  [facility];  Service: Cardiovascular   • CARDIAC  "ELECTROPHYSIOLOGY PROCEDURE N/A 11/13/2020    Procedure: Pacemaker DC [place];  Surgeon: Abhishek Zaldivar MD;  Location: [facility] INVASIVE LOCATION;  Service: Cardiology;  Laterality: N/A;  [employer]   • CARPAL TUNNEL RELEASE     • CERVICAL FUSION     • NECK SURGERY             Consults:     Consults     Date and Time Order Name Status Description    11/18/2020 1659 Inpatient Neurology Consult General Completed     11/12/2020 0153 Inpatient Cardiology Consult Completed     11/11/2020 1453 Inpatient Nephrology Consult Completed     11/8/2020 2310 Family Medicine Consult Completed           Discharge Exam:    /76 (BP Location: Right arm, Patient Position: Lying)   Pulse 65   Temp 98 °F (36.7 °C) (Oral)   Resp 16   Ht 172.7 cm (67.99\")   Wt 58.2 kg (128 lb 4.8 oz) Comment: wihtout pumps on bottom of bed  SpO2 98%   BMI 19.51 kg/m²       General: Patient is awake, however not connected. No respiratory distress  Oropharynx: Contusion and abrasion of the left aspect of patient's chin.  Poor dentition.  Laceration intraorally at the frenulum, healing.  Contusion of left sided lip.  Neck: Supple, symmetrical, trachea midline, no adenopathy;              thyroid:  no enlargement/tenderness/nodules;              no carotid bruit or JVD  Cardiovascular: Normal rate, regular rhythm and intact distal pulses.              Exam reveals no gallop and no friction rub. No murmur heard  Pulmonary: Clear to auscultation bilaterally, respirations unlabored.               No rhonchi, wheezing or rales.   Abdominal: Soft, nontender, bowel sounds active all four quadrants,     no masses, no hepatomegaly, no splenomegaly.  Cuellar in place  Extremities: Normal, atraumatic, able to move upper extremity, no much movement of the lower extremity.  Pulses: 2 + symmetric all extremities  Neurological: Patient is awake, however no connected. No new focal sensorimotor deficit.    Skin: Skin color, texture, normal. " Turgor is decreased. No rashes or lesions  Data Review:        Results from last 7 days   Lab Units 11/20/20  0552 11/19/20  0659 11/18/20  0442   WBC 10*3/mm3 10.36 9.63 10.34   HEMOGLOBIN g/dL 9.8* 10.2* 9.9*   HEMATOCRIT % 29.1* 29.3* 29.4*   PLATELETS 10*3/mm3 303 362 268       Results from last 7 days   Lab Units 11/20/20  0552 11/19/20  0659 11/18/20  0442   SODIUM mmol/L 135* 140 137   POTASSIUM mmol/L 3.7 3.7 3.9   CHLORIDE mmol/L 102 104 103   CO2 mmol/L 27.9 26.8 26.0   BUN mg/dL 15 24* 33*   CREATININE mg/dL 0.39* 0.50* 0.70*   CALCIUM mg/dL 8.1* 8.6 8.4*   GLUCOSE mg/dL 148* 86 77           Lab Results   Lab Value Date/Time    TROPONINT 0.019 05/23/2020 0921    TROPONINT <0.010 08/30/2019 0443    TROPONINT <0.010 08/28/2019 1308    TROPONINT <0.010 08/19/2019 1727    TROPONINT <0.010 08/19/2019 1538    TROPONINT <0.010 06/25/2019 1703    TROPONINT <0.010 06/25/2019 1502    TROPONINT <0.010 06/22/2019 0028    TROPONINT <0.010 06/21/2019 1846       Microbiology Results (last 10 days)     Procedure Component Value - Date/Time    COVID PRE-OP / PRE-PROCEDURE SCREENING ORDER (NO ISOLATION) - Swab, Nasopharynx [[id number]]  (Normal) Collected: 11/19/20 2141    Lab Status: Final result Specimen: Swab from Nasopharynx Updated: 11/19/20 2245    Narrative:      The following orders were created for panel order COVID PRE-OP / PRE-PROCEDURE SCREENING ORDER (NO ISOLATION) - Swab, Nasopharynx.  Procedure                               Abnormality         Status                     ---------                               -----------         ------                     Respiratory Panel PCR w/...[[id number]]  Normal              Final result                 Please view results for these tests on the individual orders.    Respiratory Panel PCR w/COVID-19(SARS-CoV-2) [facility] In-House, NP Swab in UTM/VTM, 3-4 HR TAT - Swab, Nasopharynx [[id number]]  (Normal) Collected: 11/19/20 2141    Lab Status: Final  result Specimen: Swab from Nasopharynx Updated: 11/19/20 2245     ADENOVIRUS, PCR Not Detected     Coronavirus 229E Not Detected     Coronavirus HKU1 Not Detected     Coronavirus NL63 Not Detected     Coronavirus OC43 Not Detected     COVID19 Not Detected     Human Metapneumovirus Not Detected     Human Rhinovirus/Enterovirus Not Detected     Influenza A PCR Not Detected     Influenza B PCR Not Detected     Parainfluenza Virus 1 Not Detected     Parainfluenza Virus 2 Not Detected     Parainfluenza Virus 3 Not Detected     Parainfluenza Virus 4 Not Detected     RSV, PCR Not Detected     Bordetella pertussis pcr Not Detected     Bordetella parapertussis PCR Not Detected     Chlamydophila pneumoniae PCR Not Detected     Mycoplasma pneumo by PCR Not Detected    Narrative:      Fact sheet for providers: https://docs.[employer]/wp-content/uploads/PKE6253-5660-ZN4.1-EUA-Provider-Fact-Sheet-3.pdf    Fact sheet for patients: https://docs.[employer]/wp-content/uploads/ALQ2901-3782-FF6.1-EUA-Patient-Fact-Sheet-1.pdf           Imaging Results (All)     Procedure Component Value Units Date/Time    CT Head Without Contrast [[id number]] Collected: 11/18/20 0809     Updated: 11/18/20 1010    Narrative:      NONCONTRAST HEAD CT 11/18/2020     CLINICAL HISTORY: Mental status changes, persistent, worsening.     TECHNIQUE: Spiral CT images were obtained from the base of skull to the  vertex without intravenous contrast. Images were reformatted and  submitted in 3 mm thick axial CT section with brain algorithm, 2 mm  thick sagittal and coronal reconstructions were performed and submitted  in brain algorithm.     FINDINGS: This is correlated to prior noncontrast head CT from [facility] just 10 days ago on 11/08/2020. There are extensive  areas of chronic infarction in the right middle cerebral artery  territory with a 5.3 x 3.6 cm old lateral right frontal infarct  extending inferiorly into the anterior right insular  cortex, as well as  medially into the anterior right corona radiata region and this is in  the distribution of superior division right middle cerebral artery  territory, and there is an 8.7 x 3.3 x 5 cm old infarct extending  inferior lateral right parietal lobe, posterior lateral right temporal  lobe, and throughout the lateral right occipital lobe in the  distribution temporoparietooccipital branches of the inferior-posterior  division right middle cerebral artery territory. There is 6 x 3.5 cm old  mid to lateral right temporal lobe infarct in the right middle cerebral  artery territory. There are patchy and confluent areas of low-density in  the periventricular and subcortical white matter of cerebral  hemispheres, consistent with moderate small vessel disease. There is a  12 x 6 mm old lacunar infarct in the anterior to mid left corona radiata  region. There is a small 1.5 cm old posterior inferior right occipital  infarct in the right PCA territory. There is mild cerebral atrophy. The  ventricles are normal in size. There is no midline shift and no  extra-axial fluid collections are identified, and there is no evidence  of acute intracranial hemorrhage. The calvarium and skull base are  normal in appearance. Paranasal sinuses are clear. There is partial  opacification of the right mastoid air cells with fluid and small amount  of fluid in the posterior inferior left mastoid air cells. Calcified  plaques are present in the cavernous internal carotid arteries  bilaterally.       Impression:      1. No significant change when compared to head CT 10 days ago on  11/08/2020.  2. There is moderate small vessel disease in the cerebral white matter  and there is 12 x 6 mm old lacunar infarct in the anterior to mid left  corona radiata region.  3. There are extensive areas of chronic infarction in the right middle  cerebral artery territory as described above. There is additional 1.5 cm  old posterior inferior right  occipital infarct in the right PCA  territory. There is partial opacification of the right mastoid air cells  with fluid and small amount of fluid in left mastoid air cells. The  remainder of the head CT is within normal limits with no acute  intracranial abnormality seen to account for the new mental status  changes. If there remains acute neurological symptoms, an MRI of the  brain could be obtained for more complete assessment     Radiation dose reduction techniques were utilized, including automated  exposure control and exposure modulation based on body size.  .     This report was finalized on 11/18/2020 10:07 AM by Dr. Paulino Guillen M.D.       XR Chest 1 View [[id number]] Collected: 11/17/20 1126     Updated: 11/17/20 1130    Narrative:      XR CHEST 1 VW-     Clinical: Altered mental status     COMPARISON 05/23/2020     FINDINGS: Transvenous pacemaker has been placed within the interim.  Position is satisfactory. Cardiac size within normal limits. No  mediastinal abnormality has developed. Old granulomatous disease right  lung. No effusion, edema or acute airspace disease has developed within  the interim.     CONCLUSION: Transvenous pacemaker has been placed since 05/23/2020. No  acute cardiovascular or pulmonary process is demonstrated.     This report was finalized on 11/17/2020 11:27 AM by Dr. Luan Huerta M.D.       CT Head Without Contrast [[id number]] Collected: 11/08/20 2217     Updated: 11/08/20 2231    Narrative:      CT HEAD WITHOUT CONTRAST:      HISTORY:  Fall out of bed from nursing home.     TECHNIQUE:  Axial images were obtained through the brain without  contrast administration. Multiplanar reformatted images were  reconstructed from the helical source data. Radiation dose reduction  techniques were utilized, including automated exposure control and  exposure modulation based on body size.        COMPARISON: 05/23/2020.     FINDINGS:   Extensive encephalomalacia throughout the right  hemisphere from prior  right MCA infarction.     Stable ex vacuo dilatation of the right lateral ventricle.     Stable moderate cerebral volume loss with proportionate prominence of  the remaining ventricular system and sulci. No hydrocephalus or midline  shift.      Stable moderate scattered hypodensity throughout the periventricular and  subcortical white matter that is most likely secondary to chronic  microvascular ischemia. There is no evidence of a large territorial  infarction. No hemorrhage or extra-axial fluid collection. Intracranial  atherosclerotic arterial calcifications.      The orbits are unremarkable. The visualized paranasal sinuses and left  mastoid air cells are clear. Moderate opacification of the right mastoid  air cells.       The calvarium is intact. The scalp soft tissues are unremarkable.       Impression:      1.   No acute intracranial abnormality.   2.  Sequela of prior right MCA infarction.  3.  Moderate right mastoid effusion.  4.  Senescent changes.           CERVICAL SPINE CT:     HISTORY:  Follow-up from nursing home.     TECHNIQUE:  Helical axial images were performed from the skull base  through the upper thoracic spine without contrast. Sagittal and coronal  reformatted images were performed. Radiation dose reduction techniques  were utilized, including automated exposure control and exposure  modulation based on body size.        COMPARISON:  05/23/2020.     FINDINGS:    Chronic fracture of the left C1 ring with healing along the anterior  aspect and nonunion at the posterior aspect. No evidence of acute  fracture. Stable fusion hardware at C4-C7 without hardware complication.  Straightening of the normal cervical lordosis. Stable multilevel  spondylosis. No high-grade spinal canal narrowing. The paravertebral  soft tissues are normal. The lung apices are clear.     IMPRESSION:   1.  No evidence of acute fracture.  2.  Chronic left C1 fracture with partial healing and evidence  of  nonunion.  3.  Stable fusion hardware without competition.     This report was finalized on 11/8/2020 10:28 PM by Dr. Shaheen Olea M.D.       CT Cervical Spine Without Contrast [[id number]] Collected: 11/08/20 2217     Updated: 11/08/20 2231    Narrative:      CT HEAD WITHOUT CONTRAST:      HISTORY:  Fall out of bed from nursing home.     TECHNIQUE:  Axial images were obtained through the brain without  contrast administration. Multiplanar reformatted images were  reconstructed from the helical source data. Radiation dose reduction  techniques were utilized, including automated exposure control and  exposure modulation based on body size.        COMPARISON: 05/23/2020.     FINDINGS:   Extensive encephalomalacia throughout the right hemisphere from prior  right MCA infarction.     Stable ex vacuo dilatation of the right lateral ventricle.     Stable moderate cerebral volume loss with proportionate prominence of  the remaining ventricular system and sulci. No hydrocephalus or midline  shift.      Stable moderate scattered hypodensity throughout the periventricular and  subcortical white matter that is most likely secondary to chronic  microvascular ischemia. There is no evidence of a large territorial  infarction. No hemorrhage or extra-axial fluid collection. Intracranial  atherosclerotic arterial calcifications.      The orbits are unremarkable. The visualized paranasal sinuses and left  mastoid air cells are clear. Moderate opacification of the right mastoid  air cells.       The calvarium is intact. The scalp soft tissues are unremarkable.       Impression:      1.   No acute intracranial abnormality.   2.  Sequela of prior right MCA infarction.  3.  Moderate right mastoid effusion.  4.  Senescent changes.           CERVICAL SPINE CT:     HISTORY:  Follow-up from nursing home.     TECHNIQUE:  Helical axial images were performed from the skull base  through the upper thoracic spine without contrast. Sagittal  and coronal  reformatted images were performed. Radiation dose reduction techniques  were utilized, including automated exposure control and exposure  modulation based on body size.        COMPARISON:  05/23/2020.     FINDINGS:    Chronic fracture of the left C1 ring with healing along the anterior  aspect and nonunion at the posterior aspect. No evidence of acute  fracture. Stable fusion hardware at C4-C7 without hardware complication.  Straightening of the normal cervical lordosis. Stable multilevel  spondylosis. No high-grade spinal canal narrowing. The paravertebral  soft tissues are normal. The lung apices are clear.     IMPRESSION:   1.  No evidence of acute fracture.  2.  Chronic left C1 fracture with partial healing and evidence of  nonunion.  3.  Stable fusion hardware without competition.     This report was finalized on 11/8/2020 10:28 PM by Dr. Shaheen Olea M.D.               Disposition:    Skilled nursing facility    Patient Instructions:        Discharge Medications      New Medications      Instructions Start Date   levETIRAcetam 500 MG tablet  Commonly known as: KEPPRA   500 mg, Oral, 2 Times Daily      metoprolol succinate XL 25 MG 24 hr tablet  Commonly known as: TOPROL-XL   25 mg, Oral, Daily      oxyCODONE-acetaminophen 5-325 MG per tablet  Commonly known as: PERCOCET   1 tablet, Oral, Every 8 Hours PRN      sennosides-docusate 8.6-50 MG per tablet  Commonly known as: PERICOLACE   2 tablets, Oral, Nightly         Changes to Medications      Instructions Start Date   divalproex 500 MG DR tablet  Commonly known as: DEPAKOTE  What changed:   · medication strength  · how much to take  · when to take this   500 mg, Oral, 2 times daily         Continue These Medications      Instructions Start Date   acetaminophen 325 MG tablet  Commonly known as: TYLENOL   650 mg, Oral, Every 6 Hours PRN      amLODIPine 5 MG tablet  Commonly known as: NORVASC   5 mg, Oral, Every 24 Hours Scheduled      aspirin 81  MG EC tablet   81 mg, Oral, Daily      tamsulosin 0.4 MG capsule 24 hr capsule  Commonly known as: FLOMAX   1 capsule, Oral, Nightly         Stop These Medications    atorvastatin 20 MG tablet  Commonly known as: LIPITOR     calcium carbonate-cholecalciferol 500-400 MG-UNIT tablet tablet     docusate sodium 100 MG capsule     escitalopram 10 MG tablet  Commonly known as: LEXAPRO     lisinopril 40 MG tablet  Commonly known as: PRINIVIL,ZESTRIL     melatonin 5 MG tablet tablet     OLANZapine 2.5 MG tablet  Commonly known as: zyPREXA     OXcarbazepine 600 MG tablet  Commonly known as: TRILEPTAL     PARoxetine 10 MG tablet  Commonly known as: PAXIL     polyethylene glycol 17 g packet  Commonly known as: MIRALAX     ROCEPHIN IJ     trihexyphenidyl 2 MG tablet  Commonly known as: ARTANE            Discharge Order (From admission, onward)     Start     Ordered    11/20/20 0748  Discharge patient  Once     Comments: After been seen and accepted by Hospice for community services at NH   Expected Discharge Date: 11/18/20    Expected Discharge Time: Evening    Discharge Disposition: Skilled Nursing Facility (DC - External)    Physician of Record for Attribution - Please select from Treatment Team: GURDEEP WARE [[id number]]    Review needed by CMO to determine Physician of Record: No       Question Answer Comment   Physician of Record for Attribution - Please select from Treatment Team GURDEEP WARE    Review needed by CMO to determine Physician of Record No        11/20/20 5865                 Contact information for follow-up providers     Gurdeep Ware MD .    Specialties: Internal Medicine, Hospitalist  Contact information:  [address] [facility] [address]  [place] [address] [phone]                   Contact information for after-discharge care     Destination     [facility] .    Service: Intermediate Care  Contact information:  [address] [facility] [address] [phone]                              Total time spent discharging patient including evaluation,post hospitalization follow up,  medication and post hospitalization instructions and education total time exceeds 30 minutes.    Signed:  George Ware MD  11/20/2020  07:52 EST

## 2020-11-20 NOTE — PROGRESS NOTES
"   LOS: 12 days     Chief Complaint/ Reason for encounter: Hyponatremia   Chief Complaint   Patient presents with   • Lip Laceration   • Fall     Subjective    Requiring puree and someone to assist feed him  More awake and alert but remains immobile  He states he is drinking some but needs to be drinking more  Plans to d/c back to NH with [facility] to follow    Medical history reviewed:  Lip Laceration    Fall        Subjective    History taken from: Patient and chart    Vital Signs  Temp:  [96.9 °F (36.1 °C)-99.4 °F (37.4 °C)] 99.4 °F (37.4 °C)  Heart Rate:  [60-65] 60  Resp:  [16] 16  BP: (134-171)/(76-93) 150/81       Wt Readings from Last 1 Encounters:   11/13/20 0550 58.2 kg (128 lb 4.8 oz)   11/09/20 0057 55.8 kg (123 lb)   11/08/20 1850 65.8 kg (145 lb)       Objective:  Vital signs: (most recent): Blood pressure 150/81, pulse 60, temperature 99.4 °F (37.4 °C), temperature source Oral, resp. rate 16, height 172.7 cm (67.99\"), weight 58.2 kg (128 lb 4.8 oz), SpO2 97 %.              Objective:  General Appearance:  Comfortable, malnourished and chronically ill-appearing, in no acute distress and not in pain.  Awake, alert,   HEENT: Mucous membranes moist, no injury, oropharynx clear  Lungs:  Normal effort and normal respiratory rate.  Breath sounds clear to auscultation.  No  respiratory distress.  No rales, decreased breath sounds or rhonchi.    Heart: NSR now with Pacemaker.  Regular rhythm.  S1 normal.  No murmur.   Abdomen: Abdomen is soft.  Bowel sounds are normal, no abdominal tenderness.  There is no rebound or guarding  Extremities: Normal range of motion.  No edema of bilateral lower extremities, distal pulses intact  Neurological: No focal motor or sensory deficits, pupils reactive  Skin:  Warm and dry.  No rash or cyanosis.       Results Review:    Intake/Output:     Intake/Output Summary (Last 24 hours) at 11/20/2020 1110  Last data filed at 11/20/2020 0900  Gross per 24 hour   Intake 770 ml   Output " --   Net 770 ml         DATA:  Radiology and Labs:  The following labs independently reviewed by me. Additional labs ordered for tomorrow a.m.  Interval notes, chart personally reviewed by me.     Risk/ complexity of medical care/ medical decision making moderate due to poor intake, family decision to not proceed with TF, hospice is following outpatient     Labs:   Recent Results (from the past 24 hour(s))   POC Glucose Once    Collection Time: 11/19/20 12:01 PM    Specimen: Blood   Result Value Ref Range    Glucose 116 70 - 130 mg/dL   POC Glucose Once    Collection Time: 11/19/20  5:11 PM    Specimen: Blood   Result Value Ref Range    Glucose 142 (H) 70 - 130 mg/dL   POC Glucose Once    Collection Time: 11/19/20  8:08 PM    Specimen: Blood   Result Value Ref Range    Glucose 153 (H) 70 - 130 mg/dL   Respiratory Panel PCR w/COVID-19(SARS-CoV-2) ROWAN/ARDEN/BALTA/PAD/COR/MAD/BRADEN In-House, NP Swab in UTM/VTM, 3-4 HR TAT - Swab, Nasopharynx    Collection Time: 11/19/20  9:41 PM    Specimen: Nasopharynx; Swab   Result Value Ref Range    ADENOVIRUS, PCR Not Detected Not Detected    Coronavirus 229E Not Detected Not Detected    Coronavirus HKU1 Not Detected Not Detected    Coronavirus NL63 Not Detected Not Detected    Coronavirus OC43 Not Detected Not Detected    COVID19 Not Detected Not Detected - Ref. Range    Human Metapneumovirus Not Detected Not Detected    Human Rhinovirus/Enterovirus Not Detected Not Detected    Influenza A PCR Not Detected Not Detected    Influenza B PCR Not Detected Not Detected    Parainfluenza Virus 1 Not Detected Not Detected    Parainfluenza Virus 2 Not Detected Not Detected    Parainfluenza Virus 3 Not Detected Not Detected    Parainfluenza Virus 4 Not Detected Not Detected    RSV, PCR Not Detected Not Detected    Bordetella pertussis pcr Not Detected Not Detected    Bordetella parapertussis PCR Not Detected Not Detected    Chlamydophila pneumoniae PCR Not Detected Not Detected    Mycoplasma  pneumo by PCR Not Detected Not Detected   Renal Function Panel    Collection Time: 11/20/20  5:52 AM    Specimen: Blood   Result Value Ref Range    Glucose 148 (H) 65 - 99 mg/dL    BUN 15 8 - 23 mg/dL    Creatinine 0.39 (L) 0.76 - 1.27 mg/dL    Sodium 135 (L) 136 - 145 mmol/L    Potassium 3.7 3.5 - 5.2 mmol/L    Chloride 102 98 - 107 mmol/L    CO2 27.9 22.0 - 29.0 mmol/L    Calcium 8.1 (L) 8.6 - 10.5 mg/dL    Albumin 2.80 (L) 3.50 - 5.20 g/dL    Phosphorus 1.8 (C) 2.5 - 4.5 mg/dL    Anion Gap 5.1 5.0 - 15.0 mmol/L    BUN/Creatinine Ratio 38.5 (H) 7.0 - 25.0    eGFR Non African Amer >150 >60 mL/min/1.73   CBC (No Diff)    Collection Time: 11/20/20  5:52 AM    Specimen: Blood   Result Value Ref Range    WBC 10.36 3.40 - 10.80 10*3/mm3    RBC 3.03 (L) 4.14 - 5.80 10*6/mm3    Hemoglobin 9.8 (L) 13.0 - 17.7 g/dL    Hematocrit 29.1 (L) 37.5 - 51.0 %    MCV 96.0 79.0 - 97.0 fL    MCH 32.3 26.6 - 33.0 pg    MCHC 33.7 31.5 - 35.7 g/dL    RDW 11.8 (L) 12.3 - 15.4 %    RDW-SD 41.3 37.0 - 54.0 fl    MPV 8.9 6.0 - 12.0 fL    Platelets 303 140 - 450 10*3/mm3   POC Glucose Once    Collection Time: 11/20/20  7:35 AM    Specimen: Blood   Result Value Ref Range    Glucose 116 70 - 130 mg/dL   POC Glucose Once    Collection Time: 11/20/20 11:01 AM    Specimen: Blood   Result Value Ref Range    Glucose 149 (H) 70 - 130 mg/dL       Radiology:  Imaging Results (Last 24 Hours)     ** No results found for the last 24 hours. **             Medications have been reviewed:  Current Facility-Administered Medications   Medication Dose Route Frequency Provider Last Rate Last Admin   • acetaminophen (TYLENOL) tablet 650 mg  650 mg Oral Q6H PRN Abhishek Zaldivar MD   650 mg at 11/19/20 0936   • amLODIPine (NORVASC) tablet 5 mg  5 mg Oral Q24H Dov Ramos APRN   5 mg at 11/20/20 0915   • aspirin suppository 300 mg  300 mg Rectal Daily George Ware MD   300 mg at 11/18/20 2026   • atorvastatin (LIPITOR) tablet 20 mg  20 mg Oral  Daily Abhishek Zaldivar MD   20 mg at 11/20/20 0915   • bisacodyl (DULCOLAX) suppository 10 mg  10 mg Rectal Daily Susy Chiu MD   10 mg at 11/19/20 0910   • dextrose (D50W) 25 g/ 50mL Intravenous Solution 25 g  25 g Intravenous Q15 Min PRN Abhishek Zaldivar MD       • dextrose (GLUTOSE) oral gel 15 g  15 g Oral Q15 Min PRN Abhishek Zaldivar MD       • divalproex (DEPAKOTE) DR tablet 500 mg  500 mg Oral BID Nikhil Li MD   500 mg at 11/20/20 0916   • glucagon (human recombinant) (GLUCAGEN DIAGNOSTIC) injection 1 mg  1 mg Subcutaneous Q15 Min PRN Abhishek Zaldivar MD       • insulin lispro (humaLOG) injection 0-7 Units  0-7 Units Subcutaneous TID AC Abhishek Zaldivar MD       • levETIRAcetam (KEPPRA) tablet 500 mg  500 mg Oral BID Nikhil Li MD   500 mg at 11/20/20 0915   • metoprolol succinate XL (TOPROL-XL) 24 hr tablet 25 mg  25 mg Oral Daily Abhishek Zaldivar MD   25 mg at 11/20/20 0915   • oxyCODONE-acetaminophen (PERCOCET) 5-325 MG per tablet 1 tablet  1 tablet Oral Q4H PRN George Ware MD   1 tablet at 11/16/20 2025   • potassium phosphate 30 mmol in sodium chloride 0.9 % 500 mL infusion  30 mmol Intravenous Once Jeff Allen MD       • sennosides-docusate (PERICOLACE) 8.6-50 MG per tablet 2 tablet  2 tablet Oral Nightly Susy Chiu MD   2 tablet at 11/19/20 1958   • sodium chloride 0.9 % flush 10 mL  10 mL Intravenous PRN Abhishek Zaldivar MD   10 mL at 11/10/20 2018   • sodium chloride 0.9 % flush 10 mL  10 mL Intravenous PRN Abhishek Zaldivar MD   10 mL at 11/13/20 0926   • sodium chloride 0.9 % flush 10 mL  10 mL Intravenous PRN Abhihsek Zaldivar MD       • sodium chloride 0.9 % flush 3 mL  3 mL Intravenous Q12H Abhishek Zaldivar MD   3 mL at 11/20/20 0916   • tamsulosin (FLOMAX) 24 hr capsule 0.4 mg  0.4 mg Oral Nightly Abhishek Zaldivar MD   0.4 mg at 11/19/20 1957       ASSESSMENT:   Hyponatremia, fairly stable, 135  today  Hypokalemia, nutritional  Volume depletion, improved  Hypertension, at goal  New hypophosphatemia  DM  Dysphagia  severe malnutrition  Failure to thrive  S/P pacemaker    PLAN:  Continue puréed diet, encourage oral fluid intake  I do not think he is drinking enough to warrant a fluid restriction  Sodium is fairly stable at 135  Replace phosphorus IV  Continue current BP regimen  Await discharge plans, noted plans for outpatient hospice  We will plan to see again on Monday if still inpatient      Jeff Allen MD  [facility]   Office phone number: [phone]  Answering service phone number: [phone]    11/20/20  11:10 EST

## 2020-11-20 NOTE — PROGRESS NOTES
Case Management Discharge Note      Final Note: [facility] consulted and spoke with Les/patient's brother. Agreeable with [facility] services and will be ordered once patient arrives to facility. [facility] arranged for pick-up at 1600. [facility] notified of dc today. Packet given to RN to complete. No additional needs noted.    Provided Post Acute Provider List?: Refused  Provided Post Acute Provider Quality & Resource List?: Refused    Selected Continued Care - Admitted Since 11/8/2020     Destination Coordination complete    Service Provider Selected Services Address Phone Fax Patient Preferred    [facility]  Intermediate Care [address] [id number] [phone] --          Durable Medical Equipment    No services have been selected for the patient.              Dialysis/Infusion    No services have been selected for the patient.              Home Medical Care    No services have been selected for the patient.              Therapy    No services have been selected for the patient.              Community Resources    No services have been selected for the patient.                       Final Discharge Disposition Code: 04 - intermediate care facility

## 2020-11-20 NOTE — PLAN OF CARE
Problem: Adult Inpatient Plan of Care  Goal: Plan of Care Review  Outcome: Ongoing, Not Progressing  Flowsheets  Taken 11/20/2020 0258 by Shaina Gill, RN  Progress: no change  Outcome Summary: Uneventful night. COVID swab done for dc to snf today. Tolerated swallowing meds w/ apple sauce. continuing to monitor

## 2020-12-02 ENCOUNTER — TELEPHONE (OUTPATIENT)
Dept: CARDIOLOGY | Facility: CLINIC | Age: 65
End: 2020-12-02

## 2020-12-02 NOTE — TELEPHONE ENCOUNTER
Pt implanted with [employer] PM 11/13/20. He was no show for incision check 11/25. I attempted to call pt today to reschedule and was only able to reach his brother. Per brother, pt is a resident at [facility] (ph [phone]). I called the NH and spoke with pt caregiver Selvin. She states pt is now a Hospice pt but VS are stable. She is unsure if pt can be transported for new Pacemaker and incision check. She reports his incision is healing well with no S/Sx infection. Another option would be for [facility] rep to go to NH to check device if Reps are allowed due to the pandemic. Selvin has taken a msg for ADON at the NH to call us regarding Pacemaker f/u. There is an option of doing remotes but his home monitor must not be with him at the NH. It is not communicating. Will need to discuss this with DON when she calls tomorrow. Please advise.  NS

## 2020-12-04 ENCOUNTER — TELEPHONE (OUTPATIENT)
Dept: CARDIOLOGY | Facility: CLINIC | Age: 65
End: 2020-12-04

## 2020-12-09 NOTE — TELEPHONE ENCOUNTER
"Email sent to NH FRANKIE 20    \"Ms Roman,     My name is Kathy Baker. I am one of the nurses from [facility]. Mr Trever Petit,  1955,  as you know was implanted with a new [employer] pacemaker 20. We did receive your picture of his incision and it looks to be healing well. Please call us at [phone] for any signs of infection, opening of the incision or drainage from the site. We also typically test his new device to make sure it is functioning properly at 1 week, 1 month and 90 days. We have not been able to schedule this. While office testing is best,  I understand the Covid risk and there are some alternatives if it is deemed too risky for Mr Petit.   1. Monitor pt for any signs of improper pacemaker function such as low HR (staying constantly below 60bpm), dizziness, or passing out. If any are noted please call our office as patient’s pacemaker needs to be tested.   2. Testing can be done at your facility by a [employer] Device Rep. Some facilities have been able to coordinate an appointment for a rep to come and meet the patient in an area of the facility that is isolated from other residents. [employer] can be reached at [phone]  3. Mr Petit was given a remote home monitor at implant that has not been set up at the nursing home. I don’t know if the patient has it in his possession or if someone from his family may have it. If you could look for it or contact his family and ask them to bring it in, it can be set up at his bedside. It operates off of a cell signal it finds in the atmosphere. It will need to be plugged into an electrical outlet and be placed within 6 feet of where the patient sleeps. Nightstands tend to work well. While this is not the same as manual testing, it would give us some information of how his pacemaker is functioning until it is safe for the patient to come into the office for manual testing. Any " "issues or assist in setting up his remote monitor can be addressed by calling [employer]ax [employer] number at [phone]. A home remote monitor is needed for his maintenance follow up as well and should always be at his bedside.   4.  Manual testing in the office should be scheduled as soon as pt is able to do so. Please call [phone] to schedule and for any concerns.    I understand from his caregiver that [facility] Pet[facility] and depending on his status his pacemaker follow up may be a moot point. I also know some [facility] patients stabilize and last much longer than originally thought. You know him better than we do and we hope you can update us on his status and let us know how best to proceed. Please don’t hesitate to call the office for any pacemaker questions/needs. I am only in the office part time but there are others in the department that can help you when you call.     Thank you,   Kathy \"      "

## 2020-12-17 ENCOUNTER — TELEPHONE (OUTPATIENT)
Dept: CARDIOLOGY | Facility: CLINIC | Age: 65
End: 2020-12-17

## 2020-12-17 NOTE — TELEPHONE ENCOUNTER
Kathy had previously sent you a message regarding this pt that was implanted 11/13/20 and has yet to be checked.  He is the one that is in a ns home and under hospice care.  [facility] has sent picture of insertion site and it looked good.  I received a call from [employer] rep [place] today saying that the facility has contacted BS to go to the facility and check pacemaker.  She said normally thye only go if ordered by a cardiologist.  She wanted to know if it was necessary to go to [facility] to check the pacemaker since they are trying to stay of of facilities unless absolutely necessary.  I know Kathy had asked you about this as well and they were going to try and set up monitor at [facility], but it is still not set up.  Kathy had been in contact with the wife per other message in [facility].  I told Tiffany I would check with you and see if you wanted them to go to [facility].    This can wait for Dr. Zaldivar to return.  Tiffany knew it would be next week before she hears back from us.

## 2020-12-21 NOTE — TELEPHONE ENCOUNTER
I let Tiffany know and asked her for contact name and number of who called her from the facility to check device.  I will contact them to try and get them to set up the monitor.  Kathy, I know you have already tried, but I will try again when I am back in the office later this week.

## 2020-12-24 NOTE — TELEPHONE ENCOUNTER
I called [facility] and got nowhere with them.  I can't ever verify if they the monitor.  I spoke to 2 different staff members and explained what I was calling for, only to be placed back on hold and finally hung up after a 16 minute hold. I then contacted pt's brother (the only number in epic for this pt) and he said pt had been in [facility] home for several months and he was not involved when pt was discharged from hospital to [facility] and doesn't know anything about the pacemaker monitor.

## 2021-08-13 NOTE — PROGRESS NOTES
Continued Stay Note  [facility]     Patient Name: Trever Petit  MRN: [id number]  Today's Date: 11/11/2020    Admit Date: 11/8/2020    Discharge Plan     Row Name 11/11/20 1605       Plan    Plan  [facility]    Plan Comments  Patient not ready to discharge yet but plans to return to [facility] level.  Will need EMS at discharge.        Discharge Codes    No documentation.             Shannon Epley, RN     BIBA via [facility] from home, home attendant at bedside, pt is complaining of pain to left side of abdomen that began yesterday, associated with nausea, denies vomiting, Denies chest pain

## 2022-11-30 NOTE — TELEPHONE ENCOUNTER
Kathy for the update.  It may not be much we can do.  The best option is to have his monitor placed in the nursing home if possible   [employer] Pregnancy And Lactation Text: This medication is Pregnancy Category X and contraindicated in pregnancy and in women who may become pregnant. It is unknown if this medication is excreted in breast milk.

## (undated) DEVICE — SKIN PREP TRAY W/CHG: Brand: MEDLINE INDUSTRIES, INC.

## (undated) DEVICE — RADIFOCUS GLIDEWIRE: Brand: GLIDEWIRE

## (undated) DEVICE — PK CATH CARD 40

## (undated) DEVICE — 6F .070 XB 3 100CM: Brand: VISTA BRITE TIP

## (undated) DEVICE — CATH DIAG IMPULSE FL3.5 5F 100CM

## (undated) DEVICE — PLASMABLADE PS200-040 4.0: Brand: PLASMABLADE™

## (undated) DEVICE — KT MANIFLD CARDIAC

## (undated) DEVICE — AIRLIFE™ NASAL OXYGEN CANNULA CURVED, NONFLARED TIP WITH 21 FOOT (6.4 M) CRUSH-RESISTANT TUBING, OVER-THE-EAR STYLE: Brand: AIRLIFE™

## (undated) DEVICE — DRSNG SURESITE WNDW 4X4.5

## (undated) DEVICE — CATH VENT MIV RADL PIG ST TIP 5F 110CM

## (undated) DEVICE — GLIDESHEATH SLENDER STAINLESS STEEL KIT: Brand: GLIDESHEATH SLENDER

## (undated) DEVICE — CATH DIAG IMPULSE FR4 5F 100CM

## (undated) DEVICE — LIMB HOLDER, WRIST/ANKLE: Brand: DEROYAL

## (undated) DEVICE — HI-TORQUE WHISPER ES GUIDE WIRE .014 STRAIGHTTIP 3.0 CM X 190 CM: Brand: HI-TORQUE WHISPER

## (undated) DEVICE — INTRO SHEATH PRELUDE SNAP .038 6F 13CM W/SDPRT

## (undated) DEVICE — GW EMR FIX EXCHG J STD .035 3MM 260CM

## (undated) DEVICE — LOU PACE DEFIB: Brand: MEDLINE INDUSTRIES, INC.

## (undated) DEVICE — Device